# Patient Record
Sex: FEMALE | Race: WHITE | NOT HISPANIC OR LATINO | Employment: OTHER | URBAN - METROPOLITAN AREA
[De-identification: names, ages, dates, MRNs, and addresses within clinical notes are randomized per-mention and may not be internally consistent; named-entity substitution may affect disease eponyms.]

---

## 2017-03-07 ENCOUNTER — GENERIC CONVERSION - ENCOUNTER (OUTPATIENT)
Dept: OTHER | Facility: OTHER | Age: 77
End: 2017-03-07

## 2017-05-04 ENCOUNTER — ALLSCRIPTS OFFICE VISIT (OUTPATIENT)
Dept: OTHER | Facility: OTHER | Age: 77
End: 2017-05-04

## 2017-06-29 ENCOUNTER — ALLSCRIPTS OFFICE VISIT (OUTPATIENT)
Dept: OTHER | Facility: OTHER | Age: 77
End: 2017-06-29

## 2017-09-12 ENCOUNTER — ALLSCRIPTS OFFICE VISIT (OUTPATIENT)
Dept: OTHER | Facility: OTHER | Age: 77
End: 2017-09-12

## 2017-10-02 ENCOUNTER — APPOINTMENT (EMERGENCY)
Dept: RADIOLOGY | Facility: HOSPITAL | Age: 77
End: 2017-10-02
Payer: COMMERCIAL

## 2017-10-02 ENCOUNTER — HOSPITAL ENCOUNTER (EMERGENCY)
Facility: HOSPITAL | Age: 77
Discharge: HOME | End: 2017-10-02
Attending: EMERGENCY MEDICINE | Admitting: EMERGENCY MEDICINE
Payer: COMMERCIAL

## 2017-10-02 VITALS
OXYGEN SATURATION: 95 % | RESPIRATION RATE: 14 BRPM | WEIGHT: 129.41 LBS | SYSTOLIC BLOOD PRESSURE: 163 MMHG | HEART RATE: 87 BPM | TEMPERATURE: 98 F | DIASTOLIC BLOOD PRESSURE: 81 MMHG

## 2017-10-02 DIAGNOSIS — S86.911A MUSCLE STRAIN OF RIGHT LOWER LEG, INITIAL ENCOUNTER: Primary | ICD-10-CM

## 2017-10-02 PROCEDURE — 73502 X-RAY EXAM HIP UNI 2-3 VIEWS: CPT

## 2017-10-02 PROCEDURE — 99284 EMERGENCY DEPT VISIT MOD MDM: CPT

## 2017-10-02 PROCEDURE — 73564 X-RAY EXAM KNEE 4 OR MORE: CPT

## 2017-10-02 RX ORDER — ACETAMINOPHEN 325 MG/1
650 TABLET ORAL ONCE
Status: COMPLETED | OUTPATIENT
Start: 2017-10-02 | End: 2017-10-02

## 2017-10-02 RX ADMIN — ACETAMINOPHEN 650 MG: 325 TABLET ORAL at 10:21

## 2017-10-02 NOTE — ED NOTES
[facility] transport set up by family  Transport arrived to emergency department  Pt wheeled off of unit safely       Opal Garcia RN  10/02/17 7182

## 2017-10-02 NOTE — ED NOTES
Attempted to contacts pt emergency contact, Rahul Lopes  OK per pt to attempt to contact family for transportation back to facility  Rahul Lopes did not answer, pt attempted by personal phone with no answer  Will try back       Wilmer Lopez RN  10/02/17 7775

## 2017-10-02 NOTE — ED NOTES
Pt ambulated safely with walker  Pt ambulated with steady gait  Pt denies being lightheaded or dizzy  Pt to use walker instead of cane from now on       Ronald Henderson RN  10/02/17 3710

## 2017-10-02 NOTE — DISCHARGE INSTRUCTIONS
Leg Pain   WHAT YOU NEED TO KNOW:   Leg pain may be caused by a variety of health conditions  Your tests did not show any broken bones or blood clots  DISCHARGE INSTRUCTIONS:   Return to the emergency department if:   · You have a fever  · Your leg starts to swell  · Your leg pain gets worse  · You have numbness or tingling in your leg or toes  · You cannot put any weight on or move your leg  Contact your healthcare provider if:   · Your pain does not decrease, even after treatment  · You have questions or concerns about your condition or care  Medicines:   · NSAIDs , such as ibuprofen, help decrease swelling, pain, and fever  This medicine is available with or without a doctor's order  NSAIDs can cause stomach bleeding or kidney problems in certain people  If you take blood thinner medicine, always ask your healthcare provider if NSAIDs are safe for you  Always read the medicine label and follow directions  · Take your medicine as directed  Contact your healthcare provider if you think your medicine is not helping or if you have side effects  Tell him of her if you are allergic to any medicine  Keep a list of the medicines, vitamins, and herbs you take  Include the amounts, and when and why you take them  Bring the list or the pill bottles to follow-up visits  Carry your medicine list with you in case of an emergency  Follow up with your healthcare provider as directed: You may need more tests to find the cause of your leg pain  You may need to see an orthopedic specialist or a physical therapist  Write down your questions so you remember to ask them during your visits  Manage your leg pain:   · Rest  your injured leg so that it can heal  You may need an immobilizer, brace, or splint to limit the movement of your leg  You may need to avoid putting any weight on your leg for at least 48 hours  Return to normal activities as directed      · Ice  the injury for 20 minutes every 4 hours for up to 24 hours, or as directed  Use an ice pack, or put crushed ice in a plastic bag  Cover it with a towel to protect your skin  Ice helps prevent tissue damage and decreases swelling and pain  · Elevate  your injured leg above the level of your heart as often as you can  This will help decrease swelling and pain  If possible, prop your leg on pillows or blankets to keep the area elevated comfortably  · Use assistive devices as directed  You may need to use a cane or crutches  Assistive devices help decrease pain and pressure on your leg when you walk  Ask your healthcare provider for more information about assistive devices and how to use them correctly  · Maintain a healthy weight  Extra body weight can cause pressure and pain in your hip, knee, and ankle joints  Ask your healthcare provider how much you should weigh  Ask him to help you create a weight loss plan if you are overweight  © 2017 [employer] Information is for End User's use only and may not be sold, redistributed or otherwise used for commercial purposes  All illustrations and images included in CareNotes® are the copyrighted property of A [employer] M [employer]  or Mannie Pacheco  The above information is an  only  It is not intended as medical advice for individual conditions or treatments  Talk to your doctor, nurse or pharmacist before following any medical regimen to see if it is safe and effective for you  How to Choose and Use a Walker   WHAT YOU NEED TO KNOW:   How do I choose a walker? Your healthcare provider will help you choose the walker that is right for you  You may need a walker with wheels or hand brakes  Walkers can be adjusted for your height  Some have built-in seats  Walkers may be folded for travel or storage  How do I use the walker? · To stand up:      ¨ Put the walker in front of you and slide forward in the chair  ¨ Grasp the arms of the chair       ¨ Lean slightly forward and push on the arms of the chair to raise yourself  ¨ Grasp the handles and step forward into the walker  ¨ Stand with your walker until you feel balanced and ready to walk  ¨ Move the walker forward about 1 step ahead of you  Make sure it is firmly set on the ground before you step forward  · To sit down:      ¨ Stand with the back of your legs against the chair seat  ¨ Reach back with both hands to  the chair arms  ¨ Slowly sit down and slide back into the chair  What are some safety tips for walkers that I should know? · Take small steps  when you use the walker  Do not move the walker too far in front of you as you walk  · Stand for a few seconds before you start to walk  This will help prevent dizziness  · Look straight ahead when you walk  You may run into or trip over something if you look at your feet  · Wear shoes with rubber soles , such as tennis shoes  Do not wear slippers because they can slide off your feet and cause a fall  Do not wear shoes with leather heels or soles that may slide  · Check the rubber tips and wheels on your walker  Replace them if they are worn down or torn  This helps prevent the walker from sliding or tipping over  · Check the floor to be sure it is safe  The floor must be clean, dry, and well lit  Remove throw rugs to prevent falls  Tape or nail down loose carpet edges  Keep the traffic areas and the floor free of clutter  · Attach a bag or basket to the walker  to carry items you may need  CARE AGREEMENT:   You have the right to help plan your care  Learn about your health condition and how it may be treated  Discuss treatment options with your caregivers to decide what care you want to receive  You always have the right to refuse treatment  The above information is an  only  It is not intended as medical advice for individual conditions or treatments   Talk to your doctor, nurse or pharmacist before following any medical regimen to see if it is safe and effective for you  © 2017 [employer] Information is for End User's use only and may not be sold, redistributed or otherwise used for commercial purposes  All illustrations and images included in CareNotes® are the copyrighted property of [employer]  or Mannie Pacheco

## 2017-10-02 NOTE — ED NOTES
Spoke with pts family member, Avril Sandhu at [phone]  Per Avril Height she will contact other family to arrange transport back to facility  Call back number provided       Jose Roberto Pepper RN  10/02/17 5932

## 2017-10-02 NOTE — ED PROVIDER NOTES
History  Chief Complaint   Patient presents with    Leg Pain     Pt c/o RLE pain, pt reports she thinks she "pulled it" getting into bed last night  Pt reports her legs are swollen but usually are  History provided by:  Patient  Leg Pain   Location:  Leg  Lower extremity injury: swing right leg into bed yesterday, started in groin, now down to knee along lateral aspect  Leg location:  R leg  Pain details:     Quality:  Sharp    Radiates to:  R leg    Severity:  Moderate    Onset quality:  Sudden    Duration:  12 hours    Timing:  Constant    Progression:  Waxing and waning  Chronicity:  New  Tetanus status:  Up to date  Prior injury to area:  No  Relieved by:  Rest  Worsened by:  Bearing weight and activity  Associated symptoms: swelling ( chronic and unchanged)    Associated symptoms: no decreased ROM, no fever, no neck pain, no numbness and no stiffness        None       Past Medical History:   Diagnosis Date    Hypertension        History reviewed  No pertinent surgical history  History reviewed  No pertinent family history  I have reviewed and agree with the history as documented  Social History   Substance Use Topics    Smoking status: Former Smoker    Smokeless tobacco: Never Used    Alcohol use No        Review of Systems   Constitutional: Negative for activity change, chills, diaphoresis and fever  HENT: Negative for congestion, sinus pressure and sore throat  Eyes: Negative for pain and visual disturbance  Respiratory: Negative for cough, chest tightness, shortness of breath, wheezing and stridor  Cardiovascular: Negative for chest pain and palpitations  Gastrointestinal: Negative for abdominal distention, abdominal pain, constipation, diarrhea, nausea and vomiting  Genitourinary: Negative for dysuria and frequency  Musculoskeletal: Negative for neck pain, neck stiffness and stiffness  Skin: Negative for rash     Neurological: Negative for dizziness, speech difficulty, light-headedness, numbness and headaches  Physical Exam  ED Triage Vitals [10/02/17 0926]   Temperature Pulse Respirations Blood Pressure SpO2   98 °F (36 7 °C) 92 14 (!) 187/90 93 %      Temp Source Heart Rate Source Patient Position - Orthostatic VS BP Location FiO2 (%)   Oral Monitor Sitting Right arm --      Pain Score       Worst Possible Pain           Physical Exam   Constitutional: She is oriented to person, place, and time  She appears well-developed  No distress  HENT:   Head: Normocephalic and atraumatic  Eyes: Conjunctivae are normal  Pupils are equal, round, and reactive to light  Right eye exhibits no discharge  Left eye exhibits no discharge  No scleral icterus  Neck: Normal range of motion  Neck supple  No tracheal deviation present  Cardiovascular: Normal rate, regular rhythm, normal heart sounds and intact distal pulses  No murmur heard  Pulmonary/Chest: Effort normal and breath sounds normal  No stridor  No respiratory distress  Abdominal: Soft  She exhibits no distension  There is no tenderness  There is no rebound and no guarding  Musculoskeletal: Normal range of motion  She exhibits tenderness  She exhibits no deformity  Patient having a difficult time lifting right leg up off stretcher, tenderness to palpation diffusely over right knee although no focal tenderness no pain to axial loading or with distraction of the lower extremity   Neurological: She is alert and oriented to person, place, and time  She exhibits normal muscle tone  Coordination normal    Skin: Skin is warm and dry  No rash noted  She is not diaphoretic  No erythema  No pallor  Psychiatric: She has a normal mood and affect  Vitals reviewed        ED Medications  Medications   acetaminophen (TYLENOL) tablet 650 mg (650 mg Oral Given 10/2/17 1021)       Diagnostic Studies  Labs Reviewed - No data to display    XR knee 4+ views Right injury   ED Interpretation   Degenerative changes no fracture      XR hip/pelv 2-3 vws right   ED Interpretation   Degenerative changes no fracture          Procedures  Procedures      Phone Contacts  ED Phone Contact    ED Course  ED Course as of Oct 02 1119   Mon Oct 02, 2017   1116 Patient able to ambulate with a walker without any difficulty, patient is secure and ambulating independently, advised patient that she should no longer walk with a cane but sole use a walker, I do believe this is a solely a muscular injury, patient to follow with PCP                                MDM  Number of Diagnoses or Management Options  Muscle strain of right lower leg, initial encounter: new and requires workup  Diagnosis management comments: 58-year-old female injured right leg yesterday swelling in into bed now having difficulty walking and right leg pain  Chronic lower extremity edema unchanged from baseline having a difficult time raising right lower extremity secondary to pain  , will x-ray to evaluate for bony pathology, although I think this is less likely most likely muscular strain       Amount and/or Complexity of Data Reviewed  Tests in the radiology section of CPT®: ordered and reviewed  Review and summarize past medical records: yes  Independent visualization of images, tracings, or specimens: yes      CritCare Time    Disposition  Final diagnoses:   Muscle strain of right lower leg, initial encounter     ED Disposition     ED Disposition Condition Comment    Discharge  Sagar Maxwell discharge to home/self care  Condition at discharge: Good        Follow-up Information    None       Patient's Medications    No medications on file     No discharge procedures on file      ED Provider  Electronically Signed by       Gabbie Crawford DO  10/02/17 1119

## 2017-11-09 ENCOUNTER — GENERIC CONVERSION - ENCOUNTER (OUTPATIENT)
Dept: OTHER | Facility: OTHER | Age: 77
End: 2017-11-09

## 2017-12-01 ENCOUNTER — TRANSCRIBE ORDERS (OUTPATIENT)
Dept: ADMINISTRATIVE | Facility: HOSPITAL | Age: 77
End: 2017-12-01

## 2017-12-01 DIAGNOSIS — G47.30 SLEEP APNEA, UNSPECIFIED TYPE: Primary | ICD-10-CM

## 2017-12-04 ENCOUNTER — HOSPITAL ENCOUNTER (OUTPATIENT)
Dept: SLEEP CENTER | Facility: CLINIC | Age: 77
Discharge: HOME | End: 2017-12-04
Payer: COMMERCIAL

## 2017-12-04 ENCOUNTER — TRANSCRIBE ORDERS (OUTPATIENT)
Dept: SLEEP CENTER | Facility: CLINIC | Age: 77
End: 2017-12-04

## 2017-12-04 DIAGNOSIS — G47.33 OSA (OBSTRUCTIVE SLEEP APNEA): Primary | ICD-10-CM

## 2017-12-04 DIAGNOSIS — G47.30 SLEEP APNEA, UNSPECIFIED TYPE: ICD-10-CM

## 2017-12-04 NOTE — PROGRESS NOTES
Consultation - [id number] [facility] [place]  64 y o  female  :1940  FFZ:[id number]    Physician Requesting Consult: Steve Iglesias MD     Reason for Consult : At your kind request I saw this patient for initial sleep evaluation today  The patient is here to evaluate for suspected Obstructive Sleep Apnea  Medications, Past, Family and Social Histories were reviewed  Comorbidities include:  Hypertension, dyslipidemia and lymphedema of lower extremities  Herewith findings in summary  Full details are available from our records  HPI:  She presents with complaint of excessive drowsiness of several months duration and can't fall asleep anywhere any time  She is dozing off inadvertently in conversations and recently fell off the toilet having fallen asleep  She lives alone but has been told she snores loudly and this disturbs others  She is not aware of breathing difficulties during sleep or modifying factors  She reported no features of restless leg syndrome or parasomnia  Sleep Routine:  She is getting less than 7 hours sleep  She typically falls asleep in less than 30 minutes  Sleep is interrupted infrequently  She awakens with the aid of an alarm and is not rested  She rated herself at 20/24 on the Olla Sleepiness Scale  Habits:   reports that she has quit smoking  She has never used smokeless tobacco ,  reports that she does not drink alcohol ,  reports that she does not use drugs  , she uses limited caffeine  She has not been exercising  ROS:  She has had approximately 30 lb weight gain in the past few months and attributes this to swelling of her legs  She has difficulty with memory  She has some dyspnea on effort but denied other respiratory or cardiac symptoms  A 10 point review of systems was otherwise unremarkable  Physical Exam: Salient findings on exam, are a body mass index thirty-five  Vitals are stable    Mental state    appears normal   Craniofacial anatomy is normal  Neck Circumference is 41 cm  There is excess fatty tissue and neck is thick  There are no abnormal neck masses  Nasal airway is patent  Mucous membranes appeared normal  The oral airway is crowded  Base of tongue is at Modified Mallampati class IV  She has thoracic kyphosis  Respiratory effort is normal  Air entry is good bilaterally  There are no wheezes or rales  Heart sounds are regular, there are no murmurs, no JVD  She has 3+ pedal edema  She is ambulant with a walker She has truncal obesity ]  The rest of her exam was unremarkable  Impression:   1  Probable obstructive sleep apnea  2  She may also have sleep-related hypoxia  3  Hypersomnolence secondary to the above  4  She is getting insufficient sleep   5  Swelling of the legs  6  Obesity  7  Hypertension    Plan:   1  With respect to above conditions, I counseled on pathophysiology, diagnosis, treatment options, risks and benefits; interrelationship and effects on symptoms and comorbidities; risks of no treatment; costs and insurance aspects  2  I advised on weight reduction, avoiding sleeping supine, using alcohol or sedating medications close to bed time and on safe driving practices  3  Nocturnal polysomnography is indicated and a diagnostic study will be scheduled  4  Patient is willing to try Positive airway pressure therapy and will be scheduled for a titration study if indicated  5  Follow-up will be scheduled after the studies to review results, further details of treatment options and to initiate/adjust therapy  Thank you for allowing me to participate in the care of this patient  I will keep you apprised of developments       Sincerely,    Naomi Gallegos MD  Board Certified Specialist

## 2017-12-17 ENCOUNTER — HOSPITAL ENCOUNTER (OUTPATIENT)
Dept: SLEEP CENTER | Facility: CLINIC | Age: 77
Discharge: HOME | End: 2017-12-17
Payer: COMMERCIAL

## 2017-12-17 DIAGNOSIS — G47.33 OSA (OBSTRUCTIVE SLEEP APNEA): ICD-10-CM

## 2017-12-17 PROCEDURE — 95810 POLYSOM 6/> YRS 4/> PARAM: CPT

## 2018-01-10 NOTE — MISCELLANEOUS
Message  pt called stating she thought when she saw Dr Roxy Nuñez and he ordered pumps for her legs he told her to d/c eliquis, furosemide 40mg and potassium  informed her he mentions in last ov note eliquis tx completed but no mention of other meds  in fact we do not even have them on her med sheet  she states she thought someone told her to d/c but she can't remember who  Dr Aracely Nagy is ? her and asked her to check w/ us  req she f/u Dr Aracely Nagy as it was not Dr Roxy Nuñez that made this recommendation  Active Problems    1  Deep vein thrombosis (DVT) of popliteal vein of right lower extremity, unspecified   chronicity (453 41) (I82 431)   2  Lymphedema (457 1) (I89 0)    Current Meds   1  Anastrozole 1 MG Oral Tablet; Therapy: (Recorded:[id number]) to Recorded   2  Aspirin 81 MG TABS; Therapy: (Recorded:[id number]) to Recorded   3  Atorvastatin Calcium 20 MG Oral Tablet; Therapy: (Recorded:04Aug2016) to Recorded   4  Calcium 500 + D TABS; Therapy: (Recorded:[id number]) to Recorded   5  Metoprolol Tartrate 25 MG Oral Tablet; Therapy: (Recorded:[id number]) to Recorded   6  PreserVision AREDS CAPS; Therapy: (Recorded:65Ggq3480) to Recorded   7  Vitamin B12-Folic Acid 360-083 MCG Oral Tablet; Therapy: (Recorded:71Xfl6974) to Recorded   8  Vitamin D3 2000 UNIT Oral Tablet; Therapy: (Recorded:70Pim9758) to Recorded    Allergies    1   No Known Drug Allergies    Signatures   Electronically signed by : Delmi Treviño, ; Mar  7 2017  3:29PM EST                       (Author)

## 2018-01-11 NOTE — MISCELLANEOUS
Message  Luis Manuel Massed results reviewed by Zahra Urias and it's ok for the pt to stop Eliquis  I notified the pt of this  Active Problems    1  Deep vein thrombosis (DVT) of popliteal vein of right lower extremity, unspecified   chronicity (453 41) (I82 431)    Current Meds   1  Anastrozole 1 MG Oral Tablet; Therapy: (Recorded:04Aug2016) to Recorded   2  Aspirin 81 MG TABS; Therapy: (Recorded:04Aug2016) to Recorded   3  Atorvastatin Calcium 20 MG Oral Tablet; Therapy: (Recorded:04Aug2016) to Recorded   4  Calcium 500 + D TABS; Therapy: (Recorded:04Aug2016) to Recorded   5  Eliquis 5 MG Oral Tablet; Take 1 tablet twice daily; Therapy: 04Aug2016 to (Jen Marroquin)  Requested for: 04Aug2016; Last   Rx:04Aug2016; Status: ACTIVE - Transmit to Pharmacy - Awaiting Verification Ordered   6  Metoprolol Tartrate 25 MG Oral Tablet; Therapy: (Recorded:04Aug2016) to Recorded   7  PreserVision AREDS CAPS; Therapy: (Recorded:04Aug2016) to Recorded   8  Vitamin B12-Folic Acid 738-800 MCG Oral Tablet; Therapy: (Recorded:04Aug2016) to Recorded   9  Vitamin D3 2000 UNIT Oral Tablet; Therapy: (Recorded:04Aug2016) to Recorded    Allergies    1   No Known Drug Allergies    Plan  Deep vein thrombosis (DVT) of popliteal vein of right lower extremity, unspecified  chronicity    · Eliquis 5 MG Oral Tablet    Signatures   Electronically signed by : Daniel Mendez, ; Nov 10 2016  2:21PM EST                       (Author)

## 2018-01-12 VITALS
WEIGHT: 199 LBS | SYSTOLIC BLOOD PRESSURE: 118 MMHG | DIASTOLIC BLOOD PRESSURE: 72 MMHG | BODY MASS INDEX: 36.62 KG/M2 | RESPIRATION RATE: 18 BRPM | HEIGHT: 62 IN | HEART RATE: 74 BPM

## 2018-01-13 VITALS
DIASTOLIC BLOOD PRESSURE: 70 MMHG | BODY MASS INDEX: 36.62 KG/M2 | RESPIRATION RATE: 16 BRPM | SYSTOLIC BLOOD PRESSURE: 118 MMHG | WEIGHT: 199 LBS | HEIGHT: 62 IN | HEART RATE: 77 BPM

## 2018-01-13 VITALS
HEIGHT: 62 IN | HEART RATE: 76 BPM | DIASTOLIC BLOOD PRESSURE: 68 MMHG | SYSTOLIC BLOOD PRESSURE: 122 MMHG | RESPIRATION RATE: 16 BRPM

## 2018-01-13 NOTE — MISCELLANEOUS
Message  patient called questioning if she should continue her anticoagulation  I read Dr Ada Peterson note and he said he gave patient a 2 month supply and then she should follow with PCP  I gave her that info   Active Problems    1  Deep vein thrombosis (DVT) of popliteal vein of right lower extremity, unspecified   chronicity (453 41) (I82 431)    Current Meds   1  Anastrozole 1 MG Oral Tablet; Therapy: (Recorded:04Aug2016) to Recorded   2  Aspirin 81 MG TABS; Therapy: (Recorded:04Aug2016) to Recorded   3  Atorvastatin Calcium 20 MG Oral Tablet; Therapy: (Recorded:04Aug2016) to Recorded   4  Calcium 500 + D TABS; Therapy: (Recorded:04Aug2016) to Recorded   5  Eliquis 5 MG Oral Tablet; Take 1 tablet twice daily; Therapy: 04Aug2016 to (Jayda Askew)  Requested for: 04Aug2016; Last   Rx:04Aug2016; Status: ACTIVE - Transmit to Pharmacy - Awaiting Verification Ordered   6  Metoprolol Tartrate 25 MG Oral Tablet; Therapy: (Recorded:04Aug2016) to Recorded   7  PreserVision AREDS CAPS; Therapy: (Recorded:04Aug2016) to Recorded   8  Vitamin B12-Folic Acid 685-539 MCG Oral Tablet; Therapy: (Recorded:04Aug2016) to Recorded   9  Vitamin D3 2000 UNIT Oral Tablet; Therapy: (Recorded:04Aug2016) to Recorded    Allergies    1  No Known Drug Allergies    Signatures   Electronically signed by :  Luann Thompson, ; Oct 28 2016  5:27PM EST                       (Author)

## 2018-01-15 NOTE — MISCELLANEOUS
Message  Rec'd a call from Dr Raffaele Fernández and he wanted to know when this pt should stop her Eliquis  Our office previous said that she needed to f/u with pcp but after looking into it we saw the pt at [facility] in consult for DVT in July  Eliquis was started there  Sheis to remain on it x 3 months and have an lev to decide if she can come off  LEV scheduled for 11/9 @ 3pm 248  Once I get the results will send to Dr Julio C Milan to see about stopping the Eliquis      Active Problems    1  Deep vein thrombosis (DVT) of popliteal vein of right lower extremity, unspecified   chronicity (453 41) (I82 431)    Current Meds   1  Anastrozole 1 MG Oral Tablet; Therapy: (Recorded:04Aug2016) to Recorded   2  Aspirin 81 MG TABS; Therapy: (Recorded:04Aug2016) to Recorded   3  Atorvastatin Calcium 20 MG Oral Tablet; Therapy: (Recorded:04Aug2016) to Recorded   4  Calcium 500 + D TABS; Therapy: (Recorded:04Aug2016) to Recorded   5  Eliquis 5 MG Oral Tablet; Take 1 tablet twice daily; Therapy: 04Aug2016 to (Michael Watters)  Requested for: 04Aug2016; Last   Rx:04Aug2016; Status: ACTIVE - Transmit to Pharmacy - Awaiting Verification Ordered   6  Metoprolol Tartrate 25 MG Oral Tablet; Therapy: (Recorded:04Aug2016) to Recorded   7  PreserVision AREDS CAPS; Therapy: (Recorded:04Aug2016) to Recorded   8  Vitamin B12-Folic Acid 788-436 MCG Oral Tablet; Therapy: (Recorded:04Aug2016) to Recorded   9  Vitamin D3 2000 UNIT Oral Tablet; Therapy: (Recorded:04Aug2016) to Recorded    Allergies    1   No Known Drug Allergies    Signatures   Electronically signed by : Aida Lennon, ; Nov 4 2016 12:35PM EST                       (Author)

## 2018-01-22 VITALS
RESPIRATION RATE: 16 BRPM | HEART RATE: 78 BPM | BODY MASS INDEX: 36.09 KG/M2 | WEIGHT: 196.13 LBS | DIASTOLIC BLOOD PRESSURE: 68 MMHG | SYSTOLIC BLOOD PRESSURE: 130 MMHG | HEIGHT: 62 IN | TEMPERATURE: 98.8 F

## 2018-02-15 ENCOUNTER — HOSPITAL ENCOUNTER (OUTPATIENT)
Dept: SLEEP CENTER | Facility: CLINIC | Age: 78
Discharge: HOME | End: 2018-02-15
Payer: COMMERCIAL

## 2018-02-15 DIAGNOSIS — G47.33 OSA (OBSTRUCTIVE SLEEP APNEA): ICD-10-CM

## 2018-02-15 PROCEDURE — 95811 POLYSOM 6/>YRS CPAP 4/> PARM: CPT

## 2018-02-16 NOTE — PROGRESS NOTES
Sleep Study Documentation    Pre-Sleep Study       Sleep testing procedure explained to patient:YES    Patient napped prior to study:YES- more than 30 minutes  Napped after 2PM: yes    Caffeine:Dayshift worker after 12PM   Caffeine use:NO    Alcohol:Dayshift workers after 5PM: Alcohol use:NO    Typical day for patient:YES       Study Documentation  Treatment   Optimal PAP pressure: 16 0  Leak:Small  Snore:Eliminated  REM Obtained:yes  Supplemental O2: no    Minimum SaO2 88%  Baseline SaO2 93%  PAP mask tried (list all)RESMED AIR FIT N20 NASAL MASK SIZE MEDIUM; RESMED AIR FIT F20 FFM SIZE MEDIUM  PAP mask choice (final)RESMED AIR FIT F20 FFM SIZE MEDIUM  PAP pressure at which snoring was eliminated 16 0  Minimum SaO2 at final PAP pressure 95%  Mode of Therapy:CPAP  ETCO2:No  CPAP changed to BiPAP:No    Mode of Therapy:CPAP    EKG abnormalities: no If yes, EPOCH example and comments:     EEG abnormalities: no If yes, EPOCH example and comments    Study Terminated:no      Post-Sleep Study    Medication used at bedtime or during sleep study:YES other prescription medications    Patient reports time it took to fall asleep:greater than 60 minutes    Patient reports waking up during study:1 to 2 times  Patient reports returning to sleep in 10 to 30 minutes  Patient reports sleeping 2 to 4 hours with dreaming  Patient reports sleep during study:typical    Patient rated sleepiness: Very sleepy or tired    PAP treatment:yes: Post PAP treatment patient reports feeling unsure if a change is noted and  would wear PAP mask at home

## 2018-02-21 DIAGNOSIS — G47.33 OSA (OBSTRUCTIVE SLEEP APNEA): Primary | ICD-10-CM

## 2018-02-26 ENCOUNTER — HOSPITAL ENCOUNTER (OUTPATIENT)
Dept: SLEEP CENTER | Facility: CLINIC | Age: 78
Discharge: HOME | End: 2018-02-26

## 2018-02-26 ENCOUNTER — OFFICE VISIT (OUTPATIENT)
Dept: SLEEP CENTER | Facility: CLINIC | Age: 78
End: 2018-02-26
Payer: COMMERCIAL

## 2018-02-26 VITALS
BODY MASS INDEX: 35.37 KG/M2 | DIASTOLIC BLOOD PRESSURE: 76 MMHG | HEIGHT: 62 IN | SYSTOLIC BLOOD PRESSURE: 130 MMHG | WEIGHT: 192.2 LBS | HEART RATE: 80 BPM

## 2018-02-26 DIAGNOSIS — E66.9 OBESITY (BMI 30-39.9): ICD-10-CM

## 2018-02-26 DIAGNOSIS — I10 ESSENTIAL HYPERTENSION: ICD-10-CM

## 2018-02-26 DIAGNOSIS — F51.12 INSUFFICIENT SLEEP SYNDROME: ICD-10-CM

## 2018-02-26 DIAGNOSIS — G47.33 OSA (OBSTRUCTIVE SLEEP APNEA): ICD-10-CM

## 2018-02-26 DIAGNOSIS — G47.10 HYPERSOMNIA: Primary | ICD-10-CM

## 2018-02-26 DIAGNOSIS — R22.43 LOCALIZED SWELLING OF BOTH LOWER LEGS: ICD-10-CM

## 2018-02-26 RX ORDER — CHOLECALCIFEROL (VITAMIN D3) 125 MCG
CAPSULE ORAL
COMMUNITY

## 2018-02-26 RX ORDER — ATORVASTATIN CALCIUM 20 MG/1
TABLET, FILM COATED ORAL
COMMUNITY

## 2018-02-26 RX ORDER — ANASTROZOLE 1 MG/1
1 TABLET ORAL DAILY
Refills: 6 | COMMUNITY
Start: 2018-02-14

## 2018-02-26 NOTE — PROGRESS NOTES
Follow-up Note - Sleep Center   Myrna Maxwell  76 y o  female  :1940  MRN:[id number]    I saw Price Davies in sleep clinic today for her sleep disordered breathing, coexisting sleep complaints & medical conditions  The patient had both diagnostic and therapeutic sleep studies and is here to review results and to initiate therapy  The diagnostic study confirmed moderate obstructive sleep apnea:  AHI 19/hour   Minimum oxygen saturation 76 %  [and 22% of total sleep time was spent with saturations less than 90%  Intermittent snoring was noted  During the subsequent therapeutic study, sleep disordered breathing was successfully remediated with PAP at 16 cm H2O  But there were excessive mask leaks  Medications, Past, Family, Social History & ROS were reviewed  HPI:   Patient had  difficulty tolerating positive airway pressure therapy on the titration study night  Reported no nasal congestion, no mucosal dryness, no chest or abdominal discomfort  She tolerated positive airway pressure therapy but sleep was not better than usual and felt no different the next day  Sleep Routine:  No interval changes  She continues to get insufficient sleep at around 6:00 a m  a night  She has ongoing excessive drowsiness  She rated herself at Total score: 20 /24 on the [place] sleepiness scale  On Exam: Vitals are stable: Blood pressure 130/76, pulse 80, height 5' 2" (1 575 m), weight 87 2 kg (192 lb 3 2 oz)  Body mass index is 35 15 kg/m²  Halie Nissen Neck Circumference: 41cm  Patient is well groomed alert, orientated, cooperative and in no distress  Mental state appeared normal    ] Physical findings are essentially unchanged from previous  IMPRESSION:    1  Hypersomnia     2  DAVE (obstructive sleep apnea)  Sleep F/U  - established patient    Cpap DME   3  Insufficient sleep syndrome     4  Obesity (BMI 30-39 9)     5  Essential hypertension     6   Localized swelling of both lower legs PLAN:    1  I reviewed results of the Sleep studies with the patient  2  With respect to above conditions, I counseled on pathophysiology, diagnosis, treatment options, risks and benefits; inter-relationship and effects on symptoms and comorbidities; risks of no treatment; costs and insurance aspects  3  Patient elected positive airway pressure therapy and care coordinated with the DME provider for set up in clinic today  Because of the mask leaks, I gave her a prescription for auto titrating Pap in the range of 12-18 cm H2O   4  Need for compliance with therapy and weight reduction were emphasized  5   I also advised allowing sufficient opportunity for sleep  6  Follow-up to be scheduled in 2 months to monitor compliance, progress and to adjust therapy  Thank you for allowing me to participate in the care of this patient  I will keep you apprised of developments      Sincerely,    Saba Wise MD  Board Certified Specialist

## 2018-02-26 NOTE — PROGRESS NOTES
Review of Systems      Genitourinary post menopausal   Cardiology lightheadedness   Gastrointestinal abdominal pain disturbing sleep   Neurology forgetfulness, decreased concentration, difficulity finding words and loss/change of vision   Constitutional none   Integumentary easy brusing   Psychiatry none   Musculoskeletal none   Pulmonary none   ENT none   Endocrine none   Hematological none

## 2018-04-30 ENCOUNTER — OFFICE VISIT (OUTPATIENT)
Dept: SLEEP CENTER | Facility: CLINIC | Age: 78
End: 2018-04-30

## 2018-04-30 VITALS
BODY MASS INDEX: 35.85 KG/M2 | HEART RATE: 80 BPM | SYSTOLIC BLOOD PRESSURE: 110 MMHG | DIASTOLIC BLOOD PRESSURE: 70 MMHG | HEIGHT: 62 IN | WEIGHT: 194.8 LBS

## 2018-04-30 DIAGNOSIS — G47.10 HYPERSOMNIA: ICD-10-CM

## 2018-04-30 DIAGNOSIS — R22.43 LOCALIZED SWELLING OF BOTH LOWER LEGS: ICD-10-CM

## 2018-04-30 DIAGNOSIS — E66.9 OBESITY (BMI 30-39.9): ICD-10-CM

## 2018-04-30 DIAGNOSIS — G47.33 OSA (OBSTRUCTIVE SLEEP APNEA): Primary | ICD-10-CM

## 2018-04-30 DIAGNOSIS — R68.2 DRY MOUTH: ICD-10-CM

## 2018-04-30 DIAGNOSIS — F51.12 INSUFFICIENT SLEEP SYNDROME: ICD-10-CM

## 2018-04-30 DIAGNOSIS — I10 ESSENTIAL HYPERTENSION: ICD-10-CM

## 2018-04-30 NOTE — PROGRESS NOTES
Review of Systems      Genitourinary none   Cardiology ankle/leg swelling   Gastrointestinal frequent heartburn/acid reflux   Neurology forgetfulness   Constitutional excessive sweating at night and weight change   Integumentary rash or dry skin   Psychiatry none   Musculoskeletal none   Pulmonary shortness of breath with activity and difficulty breathing when lying flat    ENT none   Endocrine frequent urination   Hematological none

## 2018-04-30 NOTE — PROGRESS NOTES
Follow-Up Note - Sleep Center   Zacarias Bosworth Groeger  76 y o  female  :1940  MRN:[id number]    CC: I saw this patient for follow-up in clinic today for her Sleep Disordered Breathing, Coexisting Sleep and Medical Problems  Medications, Past, Family & Social History were reviewed  Interval changes: [none reported ]   She  has a past medical history of Hypertension  She has a current medication list which includes the following prescription(s): anastrozole, aspirin, atorvastatin, calcium carbonate-vitamin d, vitamin d3, cyanocobalamin, and metoprolol tartrate  ROS: reviewed, see attached [& significant for acid reflux, dyspnea on effort and swelling of her legs]  HPI:  Tad Figures reports:   · Using PAP [regularly] and experiencing [no adverse effects]  · Compliance data download shows: [ she is using PAP > 4 hours per night 33% of the time and AHI is 3 8/hour at [90th percentile] pressure of 12 9cm H2O ]  She did not use CPAP in the 1st 2 weeks because she had difficulty applying the mask and needed special accommodations  · some difficulty tolerating PAP; [ dry mouth and air leaks from mask]  Her water chamber runs runs Dry within a few hours  · benefiting from use ; [sleeping better]       Sleep Routine:  She reports getting 4-5 hours sleep; she having difficulty initiating sleep, but not maintaining sleep   She awakens with the aid of an alarm feeling unrefreshed  She admits to much improved excessive drowsiness and is no longer falling asleep during the daytime but rated herself at Total score: 19 /24 on the [place] sleepiness scale ]      EXAM: Vitals are stable: Blood pressure 110/70, pulse 80, height 5' 2" (1 575 m), weight 88 4 kg (194 lb 12 8 oz)  Body mass index is 35 63 kg/m²  Jerone Pj Neck Circumference: 41 cm  Patient is alert, orientated, cooperative [and in no distress]   She has a thoracic kyphosis and maintains her neck flexed anteriorly with chin resting on her chest   Mental state [appears normal]  There are [no] facial pressure marks or rashes  She is ambulant with a walker  Physical findings are essentially unchanged from previous  IMPRESSION:     1  DAVE (obstructive sleep apnea)     2  Hypersomnia     3  Insufficient sleep syndrome     4  Dry mouth     5  Obesity (BMI 30-39 9)     6  Essential hypertension     7  Localized swelling of both lower legs         PLAN:  1  Treatment with  PAP is medically necessary and Natalie Ar is agreable to continue use  2  Pressure setting: Continue at 13-15 cm H2O     3  Instruction on care of equipment and strategies to improve comfort with use of PAP were discussed [:controlling mucosal dryness, nasal symptoms and Mask leaks]  4  Care coordinated with DME provider and prescription to replace supplies as needed was provided  5  Need for compliance with therapy and weight reduction were emphasized  6  Sleep Hygiene and behavioral techniques to manage Insomnia were discussed  I also advised allowing sufficient opportunity for sleep  7  With your consent, follow-up is advised in [1 Gabe Simmonds [or sooner if needed] [to monitor progress, compliance and to adjust therapy]  Thank you for allowing me to participate in the care of this patient      Sincerely,    La Nena Khan MD  Board Certified Specialist

## 2018-05-01 ENCOUNTER — TELEPHONE (OUTPATIENT)
Dept: SLEEP CENTER | Facility: CLINIC | Age: 78
End: 2018-05-01

## 2018-07-06 ENCOUNTER — TELEPHONE (OUTPATIENT)
Dept: SLEEP CENTER | Facility: CLINIC | Age: 78
End: 2018-07-06

## 2018-07-06 NOTE — TELEPHONE ENCOUNTER
I called PT and left a voicemail to explain her options  Either make an appt with PCP or Dr Hill Fearing        ----- Message from Mary Alexander sent at 7/6/2018 12:23 PM EDT -----  Regarding: Patient complaining of eye puffiness  Please call above patient  Patient is stating she has eye puffiness and asks if her CPAP pressure needs to be changed  The pressure itself and the mask itself is not bothering the patient  I told the patient to make sure her CPAP mask is not blowing air in to her eyes at any time  I told the patient she may consider seeing her PCP for the eye puffiness  Patient  Asked if she could consider a different CPAP mask  I advised patient to discuss this with Francois Soto

## 2018-07-16 ENCOUNTER — OFFICE VISIT (OUTPATIENT)
Dept: SLEEP CENTER | Facility: CLINIC | Age: 78
End: 2018-07-16
Payer: COMMERCIAL

## 2018-07-16 VITALS
DIASTOLIC BLOOD PRESSURE: 82 MMHG | BODY MASS INDEX: 36.1 KG/M2 | WEIGHT: 196.2 LBS | SYSTOLIC BLOOD PRESSURE: 102 MMHG | HEART RATE: 62 BPM | HEIGHT: 62 IN

## 2018-07-16 DIAGNOSIS — G47.33 OSA (OBSTRUCTIVE SLEEP APNEA): Primary | ICD-10-CM

## 2018-07-16 DIAGNOSIS — E66.9 OBESITY (BMI 30-39.9): ICD-10-CM

## 2018-07-16 DIAGNOSIS — I10 ESSENTIAL HYPERTENSION: ICD-10-CM

## 2018-07-16 DIAGNOSIS — G47.10 HYPERSOMNIA: ICD-10-CM

## 2018-07-16 DIAGNOSIS — F40.240 CLAUSTROPHOBIA: ICD-10-CM

## 2018-07-16 DIAGNOSIS — F51.12 INSUFFICIENT SLEEP SYNDROME: ICD-10-CM

## 2018-07-16 DIAGNOSIS — R68.2 DRY MOUTH: ICD-10-CM

## 2018-07-16 DIAGNOSIS — R60.0 PERIORBITAL EDEMA: ICD-10-CM

## 2018-07-16 PROCEDURE — 99214 OFFICE O/P EST MOD 30 MIN: CPT | Performed by: INTERNAL MEDICINE

## 2018-07-16 NOTE — PROGRESS NOTES
Follow-Up Note - Sleep Center   Bennie Maxwell  76 y o  female  :1940  MRN:[id number]    CC: I saw this patient for follow-up in clinic today for her Sleep Disordered Breathing, Coexisting Sleep and Medical Problems  PFSH, Problem List, Medications & Allergies were reviewed in EMR  Interval changes: [none reported ]   She  has a past medical history of Hypertension  She has a current medication list which includes the following prescription(s): anastrozole, aspirin, atorvastatin, calcium carbonate-vitamin d, vitamin d3, cyanocobalamin, and metoprolol tartrate  ROS: Reviewed (see attached)  Significant for ramon orbital puffiness even though she stopped using CPAP over a week ago  She denied nasal congestion or allergies  HPI:  With respect to sleep disordered breathing, compliance data download shows:  using PAP > 4 hours per night 53% of the time  GORDO (estimated) 1/hour at [90th percentile] pressure of 13 9cm H2O  She is using a full face mask but with minimum head get a to cause ramon orbital swelling  Elizabeth Tobin reports  · no  difficulty tolerating PAP;   · significant adverse effects: claustrophobia and ramon orbital swelling that she attributes to CPAP  · Benefitting from use: sleeping better     Sleep Routine:  She reports getting 5-6 sleep; she  having difficulty initiating sleep, but not maintaining sleep   She awakens with the aid of an alarm feeling unrefreshed  She has excessive drowsiness [ ]  [She rated herself at Total score: 18 /24 on the [place] sleepiness scale ]  She feels like napping but avoids doing so  Habits:[ reports that she has quit smoking  She has never used smokeless tobacco ], [ reports that she does not drink alcohol ], [ reports that she does not use drugs  ], Caffeine use: limited [ ], Exercise routine: regular [ ]        EXAM: /82   Pulse 62   Ht 5' 2" (1 575 m)   Wt 89 kg (196 lb 3 2 oz)   BMI 35 89 kg/m²      Patient is alert, orientated, cooperative [and in no distress]  Mental state [appears normal]  Craniofacial anatomy there is bilateral periorbital edema  There are [no] facial pressure marks or rashes  Neck Circumference: 46 cm  There are no abnormal neck masses  Nasal airway is [patent ]  Mucous membranes appeared normal  The oral airway [is crowded ] Base of tongue is at Mallampati class IV (only hard palate visible)  She is ambulant with a walker  [Apart from truncal obesity,] the rest of exam (Heart, Lungs, Abdomen, CNS and Musculoskeletal systems) was unremarkable   IMPRESSION:   1  DAVE (obstructive sleep apnea)     2  Hypersomnia     3  Dry mouth     4  Insufficient sleep syndrome     5  Claustrophobia     6  Periorbital edema     7  Obesity (BMI 30-39 9)     8  Essential hypertension         PLAN:  1  Treatment with  PAP is medically necessary and Keiry Rosa is agreable to continue use  2  Instruction on care of equipment and strategies to improve comfort with use of PAP were discussed  3  Care coordinated with DME provider and Rx to replace supplies provided  4  Pressure setting:  Continue at 12-14 cm H2O     5  Compliance with therapy was emphasized and strategies for weight reduction discussed  6  Options are limited:  I suggested a nasal interface together with a chin strap to prevent mouth opening  She may warrant a surgical procedure to minimize the ramon orbital edema  The alternate would be use of a mandibular advancement device,  upper airway surgery or no treatment for her obstructive sleep apnea and to accept the risks     7  With your consent, follow-up is advised in 1 month  [to monitor progress, compliance and to adjust therapy]  Thank you for allowing me to participate in the care of this patient      Sincerely,    Authenticated electronically by Angélica Green MD on 10/54/33   Board Certified Specialist

## 2018-07-16 NOTE — PROGRESS NOTES
Review of Systems      Genitourinary hot flashes at night   Cardiology ankle/leg swelling   Gastrointestinal frequent heartburn/acid reflux   Neurology numbness/tingling of an extremity, forgetfulness, poor concentration or confusion,  and balance problems   Constitutional claustrophobia, excessive sweating at night and weight change   Integumentary rash or dry skin   Psychiatry none   Musculoskeletal none   Pulmonary none   ENT none   Endocrine none   Hematological none

## 2020-03-18 ENCOUNTER — OFFICE VISIT (OUTPATIENT)
Dept: OBGYN CLINIC | Facility: CLINIC | Age: 80
End: 2020-03-18
Payer: COMMERCIAL

## 2020-03-18 VITALS
DIASTOLIC BLOOD PRESSURE: 92 MMHG | WEIGHT: 193 LBS | BODY MASS INDEX: 35.51 KG/M2 | HEIGHT: 62 IN | SYSTOLIC BLOOD PRESSURE: 128 MMHG

## 2020-03-18 DIAGNOSIS — L73.9 FOLLICULITIS: Primary | ICD-10-CM

## 2020-03-18 PROCEDURE — 99202 OFFICE O/P NEW SF 15 MIN: CPT | Performed by: OBSTETRICS & GYNECOLOGY

## 2020-03-18 RX ORDER — ACETAMINOPHEN 500 MG
500 TABLET ORAL EVERY 6 HOURS PRN
COMMUNITY

## 2020-03-18 RX ORDER — KETOCONAZOLE 20 MG/G
CREAM TOPICAL DAILY
COMMUNITY

## 2020-03-18 RX ORDER — MULTIVITAMIN
1 TABLET ORAL DAILY
COMMUNITY

## 2020-03-18 RX ORDER — CEPHALEXIN 500 MG/1
500 CAPSULE ORAL EVERY 12 HOURS SCHEDULED
Qty: 14 CAPSULE | Refills: 0 | Status: SHIPPED | OUTPATIENT
Start: 2020-03-18 | End: 2020-03-25

## 2020-03-18 RX ORDER — FUROSEMIDE 40 MG/1
40 TABLET ORAL 2 TIMES DAILY
COMMUNITY
End: 2021-06-21 | Stop reason: HOSPADM

## 2020-03-18 RX ORDER — B-COMPLEX WITH VITAMIN C
1 TABLET ORAL
COMMUNITY

## 2020-03-18 NOTE — PROGRESS NOTES
Assessment:  77 YO FEMALE   RECURRENT FOLLICULITIS   No active lesion noted today  History of heart failure  History of breast cancer  Thrombocytopenia  Scoliosis         Plan  Heat compression recommended at the time of any active infection  P o  Antibiotics consider Keflex  Female hygiene discussed  Return to office when needed    Keith Chong Meredith is a 78 y o  female here for a problem visit  Current complaints:  Recurrent folliculitis in her vulvar area  Personal health questionnaire reviewed: yes  Gynecologic History  No LMP recorded  Contraception: none not applicable      Obstetric History  OB History    Para Term  AB Living   0 0 0 0 0 0   SAB TAB Ectopic Multiple Live Births   0 0 0 0 0         The following portions of the patient's history were reviewed and updated as appropriate: allergies, current medications, past family history, past medical history, past social history, past surgical history and problem list     Review of Systems  Review of Systems     Objective     Physical Exam   Constitutional:   Patient is using the walker alert oriented awake x3, significant scoliosis noted   Abdominal: Soft  Genitourinary: There is no rash, tenderness or lesion on the right labia  There is no rash, tenderness or lesion on the left labia  Genitourinary Comments: Vulvar atrophy noted patient was complaining of her current abscess/folliculitis on her labia majora bilaterally no active infection noted on today's exam 1 folliculitis noted in the mons pubic area heat compression and keflex recommended        There are no Patient Instructions on file for this visit

## 2020-09-30 RX ORDER — CALCIUM CARBONATE 500(1250)
TABLET ORAL
Qty: 30 EACH | Refills: 4 | OUTPATIENT
Start: 2020-09-30

## 2021-02-12 ENCOUNTER — TELEPHONE (OUTPATIENT)
Dept: HEMATOLOGY ONCOLOGY | Facility: CLINIC | Age: 81
End: 2021-02-12

## 2021-02-12 NOTE — TELEPHONE ENCOUNTER
Someone from [facility] called to refer pt to Dr Urban Espitia for mammogram for history of breast cancer  I explained that Dr Urban Espitia is a breast surgeon, if she was scheduling a mammogram, this would be done through central scheduling  She stated that they are referring to DR Urban Espitia so she can order the mammogram   I explained that I would need records before I could schedule  The caller only knew that pt was being followed by the [facility] for her mammograms  They do not know what physician or specialty was following her, they did not know when pt was diagnosed, who performed her mastectomy or where this was done, or if she had any other treatment  I explained that I would need the records before I could schedule  She said she would look into this and call us back

## 2021-05-14 ENCOUNTER — TRANSCRIBE ORDERS (OUTPATIENT)
Dept: ADMINISTRATIVE | Facility: HOSPITAL | Age: 81
End: 2021-05-14

## 2021-05-14 DIAGNOSIS — Z85.3 HISTORY OF BREAST CANCER: Primary | ICD-10-CM

## 2021-05-28 ENCOUNTER — HOSPITAL ENCOUNTER (OUTPATIENT)
Dept: ULTRASOUND IMAGING | Facility: CLINIC | Age: 81
Discharge: HOME | End: 2021-05-28
Payer: COMMERCIAL

## 2021-05-28 ENCOUNTER — HOSPITAL ENCOUNTER (OUTPATIENT)
Dept: MAMMOGRAPHY | Facility: CLINIC | Age: 81
Discharge: HOME | End: 2021-05-28
Payer: COMMERCIAL

## 2021-05-28 ENCOUNTER — TELEPHONE (OUTPATIENT)
Dept: MAMMOGRAPHY | Facility: CLINIC | Age: 81
End: 2021-05-28

## 2021-05-28 VITALS — HEIGHT: 62 IN | WEIGHT: 193 LBS | BODY MASS INDEX: 35.51 KG/M2

## 2021-05-28 DIAGNOSIS — R92.8 ABNORMAL MAMMOGRAM: ICD-10-CM

## 2021-05-28 DIAGNOSIS — Z85.3 HISTORY OF BREAST CANCER: ICD-10-CM

## 2021-05-28 PROCEDURE — 76642 ULTRASOUND BREAST LIMITED: CPT

## 2021-05-28 PROCEDURE — G0279 TOMOSYNTHESIS, MAMMO: HCPCS

## 2021-05-28 PROCEDURE — 77065 DX MAMMO INCL CAD UNI: CPT

## 2021-06-15 ENCOUNTER — APPOINTMENT (EMERGENCY)
Dept: CT IMAGING | Facility: HOSPITAL | Age: 81
DRG: 291 | End: 2021-06-15
Payer: COMMERCIAL

## 2021-06-15 ENCOUNTER — HOSPITAL ENCOUNTER (INPATIENT)
Facility: HOSPITAL | Age: 81
LOS: 5 days | Discharge: HOME | DRG: 291 | End: 2021-06-21
Attending: EMERGENCY MEDICINE | Admitting: HOSPITALIST
Payer: COMMERCIAL

## 2021-06-15 DIAGNOSIS — I10 ESSENTIAL HYPERTENSION: ICD-10-CM

## 2021-06-15 DIAGNOSIS — R19.7 DIARRHEA: ICD-10-CM

## 2021-06-15 DIAGNOSIS — R11.10 VOMITING: ICD-10-CM

## 2021-06-15 DIAGNOSIS — R09.02 HYPOXIA: Primary | ICD-10-CM

## 2021-06-15 PROBLEM — J96.01 ACUTE RESPIRATORY FAILURE WITH HYPOXIA (HCC): Status: ACTIVE | Noted: 2021-06-15

## 2021-06-15 PROBLEM — K52.9 GASTROENTERITIS: Status: ACTIVE | Noted: 2021-06-15

## 2021-06-15 PROBLEM — M79.89 LEG SWELLING: Status: ACTIVE | Noted: 2021-06-15

## 2021-06-15 LAB
ALBUMIN SERPL BCP-MCNC: 3 G/DL (ref 3.5–5)
ALP SERPL-CCNC: 108 U/L (ref 46–116)
ALT SERPL W P-5'-P-CCNC: 26 U/L (ref 12–78)
ANION GAP SERPL CALCULATED.3IONS-SCNC: 9 MMOL/L (ref 4–13)
AST SERPL W P-5'-P-CCNC: 29 U/L (ref 5–45)
BASOPHILS # BLD AUTO: 0.03 THOUSANDS/ΜL (ref 0–0.1)
BASOPHILS NFR BLD AUTO: 0 % (ref 0–1)
BILIRUB SERPL-MCNC: 0.6 MG/DL (ref 0.2–1)
BUN SERPL-MCNC: 23 MG/DL (ref 5–25)
CALCIUM ALBUM COR SERPL-MCNC: 9.1 MG/DL (ref 8.3–10.1)
CALCIUM SERPL-MCNC: 8.3 MG/DL (ref 8.3–10.1)
CHLORIDE SERPL-SCNC: 103 MMOL/L (ref 100–108)
CO2 SERPL-SCNC: 28 MMOL/L (ref 21–32)
CREAT SERPL-MCNC: 1 MG/DL (ref 0.6–1.3)
EOSINOPHIL # BLD AUTO: 0 THOUSAND/ΜL (ref 0–0.61)
EOSINOPHIL NFR BLD AUTO: 0 % (ref 0–6)
ERYTHROCYTE [DISTWIDTH] IN BLOOD BY AUTOMATED COUNT: 15.8 % (ref 11.6–15.1)
GFR SERPL CREATININE-BSD FRML MDRD: 53 ML/MIN/1.73SQ M
GLUCOSE SERPL-MCNC: 159 MG/DL (ref 65–140)
HCT VFR BLD AUTO: 43 % (ref 34.8–46.1)
HGB BLD-MCNC: 13.2 G/DL (ref 11.5–15.4)
IMM GRANULOCYTES # BLD AUTO: 0.06 THOUSAND/UL (ref 0–0.2)
IMM GRANULOCYTES NFR BLD AUTO: 1 % (ref 0–2)
LYMPHOCYTES # BLD AUTO: 0.14 THOUSANDS/ΜL (ref 0.6–4.47)
LYMPHOCYTES NFR BLD AUTO: 1 % (ref 14–44)
MCH RBC QN AUTO: 28.2 PG (ref 26.8–34.3)
MCHC RBC AUTO-ENTMCNC: 30.7 G/DL (ref 31.4–37.4)
MCV RBC AUTO: 92 FL (ref 82–98)
MONOCYTES # BLD AUTO: 0.68 THOUSAND/ΜL (ref 0.17–1.22)
MONOCYTES NFR BLD AUTO: 7 % (ref 4–12)
NEUTROPHILS # BLD AUTO: 9.14 THOUSANDS/ΜL (ref 1.85–7.62)
NEUTS SEG NFR BLD AUTO: 91 % (ref 43–75)
NRBC BLD AUTO-RTO: 0 /100 WBCS
NT-PROBNP SERPL-MCNC: 346 PG/ML
PLATELET # BLD AUTO: 153 THOUSANDS/UL (ref 149–390)
PMV BLD AUTO: 11.5 FL (ref 8.9–12.7)
POTASSIUM SERPL-SCNC: 3.9 MMOL/L (ref 3.5–5.3)
PROT SERPL-MCNC: 7 G/DL (ref 6.4–8.2)
RBC # BLD AUTO: 4.68 MILLION/UL (ref 3.81–5.12)
SARS-COV-2 RNA RESP QL NAA+PROBE: NEGATIVE
SODIUM SERPL-SCNC: 140 MMOL/L (ref 136–145)
TROPONIN I SERPL-MCNC: <0.02 NG/ML
TSH SERPL DL<=0.05 MIU/L-ACNC: 0.65 UIU/ML (ref 0.36–3.74)
WBC # BLD AUTO: 10.05 THOUSAND/UL (ref 4.31–10.16)

## 2021-06-15 PROCEDURE — 99285 EMERGENCY DEPT VISIT HI MDM: CPT | Performed by: EMERGENCY MEDICINE

## 2021-06-15 PROCEDURE — 74177 CT ABD & PELVIS W/CONTRAST: CPT

## 2021-06-15 PROCEDURE — 85025 COMPLETE CBC W/AUTO DIFF WBC: CPT | Performed by: EMERGENCY MEDICINE

## 2021-06-15 PROCEDURE — 83880 ASSAY OF NATRIURETIC PEPTIDE: CPT | Performed by: EMERGENCY MEDICINE

## 2021-06-15 PROCEDURE — 84443 ASSAY THYROID STIM HORMONE: CPT | Performed by: EMERGENCY MEDICINE

## 2021-06-15 PROCEDURE — 84484 ASSAY OF TROPONIN QUANT: CPT | Performed by: INTERNAL MEDICINE

## 2021-06-15 PROCEDURE — 99285 EMERGENCY DEPT VISIT HI MDM: CPT

## 2021-06-15 PROCEDURE — 93005 ELECTROCARDIOGRAM TRACING: CPT

## 2021-06-15 PROCEDURE — 84484 ASSAY OF TROPONIN QUANT: CPT | Performed by: EMERGENCY MEDICINE

## 2021-06-15 PROCEDURE — 71275 CT ANGIOGRAPHY CHEST: CPT

## 2021-06-15 PROCEDURE — U0003 INFECTIOUS AGENT DETECTION BY NUCLEIC ACID (DNA OR RNA); SEVERE ACUTE RESPIRATORY SYNDROME CORONAVIRUS 2 (SARS-COV-2) (CORONAVIRUS DISEASE [COVID-19]), AMPLIFIED PROBE TECHNIQUE, MAKING USE OF HIGH THROUGHPUT TECHNOLOGIES AS DESCRIBED BY CMS-2020-01-R: HCPCS | Performed by: EMERGENCY MEDICINE

## 2021-06-15 PROCEDURE — 36415 COLL VENOUS BLD VENIPUNCTURE: CPT | Performed by: EMERGENCY MEDICINE

## 2021-06-15 PROCEDURE — 80053 COMPREHEN METABOLIC PANEL: CPT | Performed by: EMERGENCY MEDICINE

## 2021-06-15 PROCEDURE — 96374 THER/PROPH/DIAG INJ IV PUSH: CPT

## 2021-06-15 PROCEDURE — G1004 CDSM NDSC: HCPCS

## 2021-06-15 PROCEDURE — U0005 INFEC AGEN DETEC AMPLI PROBE: HCPCS | Performed by: EMERGENCY MEDICINE

## 2021-06-15 RX ORDER — FUROSEMIDE 40 MG/1
40 TABLET ORAL EVERY EVENING
Status: DISCONTINUED | OUTPATIENT
Start: 2021-06-16 | End: 2021-06-16

## 2021-06-15 RX ORDER — ACETAMINOPHEN 325 MG/1
500 TABLET ORAL EVERY 6 HOURS PRN
Status: DISCONTINUED | OUTPATIENT
Start: 2021-06-15 | End: 2021-06-21 | Stop reason: HOSPADM

## 2021-06-15 RX ORDER — HEPARIN SODIUM 5000 [USP'U]/ML
5000 INJECTION, SOLUTION INTRAVENOUS; SUBCUTANEOUS EVERY 8 HOURS SCHEDULED
Status: DISCONTINUED | OUTPATIENT
Start: 2021-06-16 | End: 2021-06-21 | Stop reason: HOSPADM

## 2021-06-15 RX ORDER — FUROSEMIDE 80 MG
80 TABLET ORAL DAILY
Status: DISCONTINUED | OUTPATIENT
Start: 2021-06-16 | End: 2021-06-16

## 2021-06-15 RX ORDER — ASPIRIN 81 MG/1
81 TABLET, CHEWABLE ORAL DAILY
Status: DISCONTINUED | OUTPATIENT
Start: 2021-06-16 | End: 2021-06-21 | Stop reason: HOSPADM

## 2021-06-15 RX ORDER — FUROSEMIDE 10 MG/ML
40 INJECTION INTRAMUSCULAR; INTRAVENOUS ONCE
Status: COMPLETED | OUTPATIENT
Start: 2021-06-15 | End: 2021-06-15

## 2021-06-15 RX ORDER — ONDANSETRON 2 MG/ML
4 INJECTION INTRAMUSCULAR; INTRAVENOUS EVERY 6 HOURS PRN
Status: DISCONTINUED | OUTPATIENT
Start: 2021-06-15 | End: 2021-06-21 | Stop reason: HOSPADM

## 2021-06-15 RX ORDER — B-COMPLEX WITH VITAMIN C
1 TABLET ORAL
Status: DISCONTINUED | OUTPATIENT
Start: 2021-06-16 | End: 2021-06-21 | Stop reason: HOSPADM

## 2021-06-15 RX ORDER — ATORVASTATIN CALCIUM 20 MG/1
20 TABLET, FILM COATED ORAL
Status: DISCONTINUED | OUTPATIENT
Start: 2021-06-16 | End: 2021-06-21 | Stop reason: HOSPADM

## 2021-06-15 RX ADMIN — IOHEXOL 100 ML: 350 INJECTION, SOLUTION INTRAVENOUS at 20:20

## 2021-06-15 RX ADMIN — FUROSEMIDE 40 MG: 10 INJECTION, SOLUTION INTRAMUSCULAR; INTRAVENOUS at 22:23

## 2021-06-15 NOTE — ED PROVIDER NOTES
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 on daily aspirin, known bifascicular block, chronic lower extremity lymphedema, hypertension, and GERD, who presents for evaluation of nausea, vomiting, diarrhea, and increased swelling in her legs  Patient states that during the day yesterday she developed diarrhea, has had approximately 3 episodes of watery loose stool over that period of time  Her eyesight is poor so she is unable to tell me if her stool was black but does not think that it was bloody  This morning she had 1 episode of vomiting, denies persistent nausea  She denies abdominal pain currently but did have abdominal pain yesterday that migrated her around her abdomen and has since subsided  Denies any pain in her chest   She does report shortness of breath whenever I exercise    She was noted to be hypoxic with SpO2 of 87% on room air on arrival   She does not use oxygen at home and denies history of lung disease or use of inhalers  Reports that the swelling in her legs has increased over the last few days  Denies pain in her legs  Prior to Admission Medications   Prescriptions Last Dose Informant Patient Reported? Taking?    Calcium Carbonate-Vitamin D (CALCIUM 500 + D) 500-125 MG-UNIT TABS  Self Yes Yes   Sig: Take by mouth   Cholecalciferol (VITAMIN D3) 2000 units TABS  Self Yes Yes   Sig: Take by mouth   Multiple Vitamin (MULTIVITAMIN) tablet  Self Yes Yes   Sig: Take 1 tablet by mouth daily   acetaminophen (TYLENOL) 500 mg tablet  Self Yes Yes   Sig: Take 500 mg by mouth every 6 (six) hours as needed for mild pain   anastrozole (ARIMIDEX) 1 mg tablet  Self Yes Yes   Sig: Take 1 mg by mouth daily aspirin 81 MG tablet  Self Yes Yes   Sig: Take by mouth   atorvastatin (LIPITOR) 20 mg tablet  Self Yes Yes   Sig: Take by mouth   calcium carbonate-vitamin D (OSCAL-D) 500 mg-200 units per tablet  Self Yes Yes   Sig: Take 1 tablet by mouth daily with breakfast   cyanocobalamin (VITAMIN B-12) 500 mcg tablet  Self Yes Yes   Sig: Take by mouth   furosemide (LASIX) 40 mg tablet  Self Yes Yes   Sig: Take 40 mg by mouth 2 (two) times a day   ketoconazole (NIZORAL) 2 % cream  Self Yes Yes   Sig: Apply topically daily   metoprolol tartrate (LOPRESSOR) 25 mg tablet  Self Yes Yes   Sig: Take by mouth      Facility-Administered Medications: None           Past Surgical History:   Procedure Laterality Date    APPENDECTOMY      BREAST BIOPSY  12/28/2015    CHOLECYSTECTOMY  2009    COLONOSCOPY  10/30/2014    HERNIA REPAIR Right     MAMMO NEEDLE LOCALIZATION RIGHT (ALL INC) Right 1/10/2013    MASTECTOMY Right 2013    MASTECTOMY Right 02/01/2013    TONSILLECTOMY      TONSILLECTOMY      US BREAST NEEDLE LOC LEFT Left 12/28/2015       Family History   Problem Relation Age of Onset    Stomach cancer Mother     Lung cancer Brother     Alzheimer's disease Brother     Parkinsonism Brother     Hypertension Brother     Heart disease Brother     Heart disease Father     Heart disease Other      I have reviewed and agree with the history as documented  E-Cigarette/Vaping    E-Cigarette Use Never User      E-Cigarette/Vaping Substances    Nicotine No     THC No     CBD No     Flavoring No     Other No     Unknown No      Social History     Tobacco Use    Smoking status: Former Smoker    Smokeless tobacco: Never Used    Tobacco comment: hasn't smoked since highschool age   [de-identified] Use    Vaping Use: Never used   Substance Use Topics    Alcohol use: No    Drug use: No       Review of Systems   Constitutional: Negative for chills and fever  HENT: Negative for congestion      Eyes: Negative for visual disturbance  Respiratory: Positive for shortness of breath  Negative for cough  Cardiovascular: Positive for leg swelling (increased from baseline)  Negative for chest pain  Gastrointestinal: Positive for abdominal pain (resolved), diarrhea, nausea and vomiting  Genitourinary: Negative for dysuria, flank pain and frequency  Musculoskeletal: Negative for arthralgias, back pain, neck pain and neck stiffness  Skin: Negative for rash  Neurological: Negative for weakness, numbness and headaches  Psychiatric/Behavioral: Negative for agitation, behavioral problems and confusion  Physical Exam  Physical Exam  Constitutional:       General: She is not in acute distress  Appearance: She is well-developed  She is not diaphoretic  HENT:      Head: Normocephalic and atraumatic  Right Ear: External ear normal       Left Ear: External ear normal       Nose: Nose normal    Eyes:      Conjunctiva/sclera: Conjunctivae normal    Neck:      Comments: kyphosis  Cardiovascular:      Rate and Rhythm: Regular rhythm  Tachycardia present  Pulses: Normal pulses  Heart sounds: Normal heart sounds  No murmur heard  No friction rub  No gallop  Pulmonary:      Effort: Pulmonary effort is normal  No respiratory distress  Breath sounds: No wheezing or rales  Comments: Soft crackles worse at the bases  Abdominal:      General: Bowel sounds are normal  There is no distension  Palpations: Abdomen is soft  Tenderness: There is no abdominal tenderness  There is no guarding  Comments: Abdomen benign to deep palpation   Musculoskeletal:         General: No deformity  Normal range of motion  Comments: 3+ edema to the bilateral LEs below the knees, no calf tenderness   Skin:     General: Skin is warm and dry  Neurological:      Mental Status: She is alert and oriented to person, place, and time  Motor: No abnormal muscle tone     Psychiatric:         Mood and Affect: Mood normal          Vital Signs  ED Triage Vitals   Temperature Pulse Respirations Blood Pressure SpO2   06/15/21 1941 06/15/21 1849 06/15/21 1849 06/15/21 1849 06/15/21 1849   97 8 °F (36 6 °C) (!) 111 20 104/58 (!) 87 %      Temp Source Heart Rate Source Patient Position - Orthostatic VS BP Location FiO2 (%)   06/15/21 1941 06/15/21 1849 06/15/21 1849 06/15/21 1849 --   Oral Monitor Lying Left arm       Pain Score       06/15/21 2301       2           Vitals:    06/15/21 1849 06/15/21 1930 06/15/21 2300 06/16/21 0013   BP: 104/58 96/55 96/60 122/71   Pulse: (!) 111 102 94 97   Patient Position - Orthostatic VS: Lying Lying Lying Lying         Visual Acuity      ED Medications  Medications   acetaminophen (TYLENOL) tablet 488 mg (has no administration in time range)   aspirin chewable tablet 81 mg (has no administration in time range)   atorvastatin (LIPITOR) tablet 20 mg (has no administration in time range)   calcium carbonate-vitamin D (OSCAL-D) 500 mg-200 units per tablet 1 tablet (has no administration in time range)   metoprolol tartrate (LOPRESSOR) tablet 12 5 mg (has no administration in time range)   furosemide (LASIX) tablet 40 mg (has no administration in time range)   furosemide (LASIX) tablet 80 mg (has no administration in time range)   ondansetron (ZOFRAN) injection 4 mg (has no administration in time range)   heparin (porcine) subcutaneous injection 5,000 Units (has no administration in time range)   iohexol (OMNIPAQUE) 350 MG/ML injection (SINGLE-DOSE) 100 mL (100 mL Intravenous Given 6/15/21 2020)   furosemide (LASIX) injection 40 mg (40 mg Intravenous Given 6/15/21 2223)       Diagnostic Studies  Results Reviewed     Procedure Component Value Units Date/Time    Troponin I [[id number]]  (Normal) Collected: 06/15/21 2332    Lab Status: Final result Specimen: Blood from Arm, Left Updated: 06/16/21 0014     Troponin I <0 02 ng/mL     Troponin I [[id number]]     Lab Status: No result Specimen: Blood Urinalysis with microscopic [[id number]]     Lab Status: No result Specimen: Urine     Lipase [[id number]]     Lab Status: No result Specimen: Blood     Novel Coronavirus [facility] [[id number]]  (Normal) Collected: 06/15/21 1923    Lab Status: Final result Specimen: Nares from Nose Updated: 06/15/21 2051     SARS-CoV-2 Negative    Narrative: The specimen collection materials, transport medium, and/or testing methodology utilized in the production of these test results have been proven to be reliable in a limited validation with an abbreviated program under the Emergency Utilization Authorization provided by the FDA  Testing reported as "Presumptive positive" will be confirmed with secondary testing to ensure result accuracy  Clinical caution and judgement should be used with the interpretation of these results with consideration of the clinical impression and other laboratory testing  Testing reported as "Positive" or "Negative" has been proven to be accurate according to standard laboratory validation requirements  All testing is performed with control materials showing appropriate reactivity at standard intervals        CBC and differential [[id number]]  (Abnormal) Collected: 06/15/21 1923    Lab Status: Final result Specimen: Blood from Arm, Left Updated: 06/15/21 2027     WBC 10 05 Thousand/uL      RBC 4 68 Million/uL      Hemoglobin 13 2 g/dL      Hematocrit 43 0 %      MCV 92 fL      MCH 28 2 pg      MCHC 30 7 g/dL      RDW 15 8 %      MPV 11 5 fL      Platelets 127 Thousands/uL      nRBC 0 /100 WBCs      Neutrophils Relative 91 %      Immat GRANS % 1 %      Lymphocytes Relative 1 %      Monocytes Relative 7 %      Eosinophils Relative 0 %      Basophils Relative 0 %      Neutrophils Absolute 9 14 Thousands/µL      Immature Grans Absolute 0 06 Thousand/uL      Lymphocytes Absolute 0 14 Thousands/µL      Monocytes Absolute 0 68 Thousand/µL      Eosinophils Absolute 0 00 Thousand/µL      Basophils Absolute 0 03 Thousands/µL     Narrative: This is an appended report  These results have been appended to a previously verified report  NT-BNP PRO [[id number]]  (Normal) Collected: 06/15/21 1923    Lab Status: Final result Specimen: Blood from Arm, Left Updated: 06/15/21 2013     NT-proBNP 346 pg/mL     TSH [[id number]]  (Normal) Collected: 06/15/21 1923    Lab Status: Final result Specimen: Blood from Arm, Left Updated: 06/15/21 2013     TSH 3RD GENERATON 0 649 uIU/mL     Narrative:      Patients undergoing fluorescein dye angiography may retain small amounts of fluorescein in the body for 48-72 hours post procedure  Samples containing fluorescein can produce falsely depressed TSH values  If the patient had this procedure,a specimen should be resubmitted post fluorescein clearance        Troponin I [[id number]]  (Normal) Collected: 06/15/21 1923    Lab Status: Final result Specimen: Blood from Arm, Left Updated: 06/15/21 2006     Troponin I <0 02 ng/mL     Comprehensive metabolic panel [[id number]]  (Abnormal) Collected: 06/15/21 1923    Lab Status: Final result Specimen: Blood from Arm, Left Updated: 06/15/21 2002     Sodium 140 mmol/L      Potassium 3 9 mmol/L      Chloride 103 mmol/L      CO2 28 mmol/L      ANION GAP 9 mmol/L      BUN 23 mg/dL      Creatinine 1 00 mg/dL      Glucose 159 mg/dL      Calcium 8 3 mg/dL      Corrected Calcium 9 1 mg/dL      AST 29 U/L      ALT 26 U/L      Alkaline Phosphatase 108 U/L      Total Protein 7 0 g/dL      Albumin 3 0 g/dL      Total Bilirubin 0 60 mg/dL      eGFR 53 ml/min/1 73sq m     Narrative:      [facility] guidelines for Chronic Kidney Disease (CKD):     Stage 1 with normal or high GFR (GFR > 90 mL/min/1 73 square meters)    Stage 2 Mild CKD (GFR = 60-89 mL/min/1 73 square meters)    Stage 3A Moderate CKD (GFR = 45-59 mL/min/1 73 square meters)    Stage 3B Moderate CKD (GFR = 30-44 mL/min/1 73 square meters)    Stage 4 Severe CKD (GFR = 15-29 mL/min/1 73 square meters)    Stage 5 End Stage CKD (GFR <15 mL/min/1 73 square meters)  Note: GFR calculation is accurate only with a steady state creatinine    Stool Enteric Bacterial Panel by PCR [[id number]]     Lab Status: No result Specimen: Stool from Rectum     Clostridium difficile toxin by PCR with EIA [[id number]]     Lab Status: No result Specimen: Stool from Per Rectum                  PE Study with CT abdomen & pelvis with contrast   Final Result by Kristie Townsend MD (06/15 2120)      No acute findings in the chest; no pulmonary arterial embolism or pulmonary infiltrate/consolidation  No acute inflammatory changes in the abdomen or pelvis  Workstation performed: [id number]         VAS lower limb venous duplex study, complete bilateral    (Results Pending)              Procedures  Procedures         ED Course                             SBIRT 22yo+      Most Recent Value   SBIRT (24 yo +)   In order to provide better care to our patients, we are screening all of our patients for alcohol and drug use  Would it be okay to ask you these screening questions? Unable to answer at this time Filed at: 06/15/2021 [facility]8                    [facility]  Number of Diagnoses or Management Options  Diarrhea: new and requires workup  Hypoxia: new and requires workup  Vomiting: new and requires workup  Diagnosis management comments: Nontoxic  Afebrile and hemodynamically stable  Patient is noted to be hypoxic, satting 87% on room air on arrival   She was started on 2 L of oxygen by nasal cannula and is now satting in the low 90s  She has soft crackles heard throughout all lung fields, no wheezing  Denies chest pain      I personally interpreted the patient's EKG which reveals sinus tachycardia to 104 beats per minute, left axis deviation, bifascicular block, T-wave inversions in the inferior leads and leads V1 through V4 without prior available for comparison, though per documentation in Care everywhere patient does have a prior history of bifascicular block  She denies chest pain but troponin was obtained in the setting of risk stratification which is undetectable  BNP not elevated  COVID-19 testing negative  Patient noted to have road lower extremity edema on exam   Lower extremity Doppler without evidence of DVT  CTA of the chest without PE or pneumonia  CT of the abdomen pelvis without inflammatory changes  Stool studies ordered given report of diarrhea  Shortness of breath with leg swelling possibly due to CHF  Most recent echo was in 2018 showing normal ejection fraction  Will give 40 mg of IV Lasix and admit for further management  Amount and/or Complexity of Data Reviewed  Clinical lab tests: ordered and reviewed  Tests in the radiology section of CPT®: ordered and reviewed  Review and summarize past medical records: yes  Discuss the patient with other providers: yes  Independent visualization of images, tracings, or specimens: yes    Patient Progress  Patient progress: stable           Disposition  Final diagnoses:   Hypoxia   Diarrhea   Vomiting     Time reflects when diagnosis was documented in both MDM as applicable and the Disposition within this note     Time User Action Codes Description Comment    6/15/2021 10:56 PM Shae Mckeon Add [R09 02] Hypoxia     6/15/2021 10:56 PM Donna Buckner [R19 7] Diarrhea     6/15/2021 10:56 PM Shae Linda Add [R11 10] Vomiting       ED Disposition     ED Disposition Condition Date/Time Comment    Admit Stable Tue Omar 15, 2021 10:56 PM Case was discussed with LISA and the patient's admission status was agreed to be Admission Status: observation status to the service of Dr Henrietta Rodriguez          Follow-up Information    None         Current Discharge Medication List      CONTINUE these medications which have NOT CHANGED    Details   acetaminophen (TYLENOL) 500 mg tablet Take 500 mg by mouth every 6 (six) hours as needed for mild pain      anastrozole (ARIMIDEX) 1 mg tablet Take 1 mg by mouth daily  Refills: 6      aspirin 81 MG tablet Take by mouth      atorvastatin (LIPITOR) 20 mg tablet Take by mouth      Calcium Carbonate-Vitamin D (CALCIUM 500 + D) 500-125 MG-UNIT TABS Take by mouth      calcium carbonate-vitamin D (OSCAL-D) 500 mg-200 units per tablet Take 1 tablet by mouth daily with breakfast      Cholecalciferol (VITAMIN D3) 2000 units TABS Take by mouth      cyanocobalamin (VITAMIN B-12) 500 mcg tablet Take by mouth      furosemide (LASIX) 40 mg tablet Take 40 mg by mouth 2 (two) times a day      ketoconazole (NIZORAL) 2 % cream Apply topically daily      metoprolol tartrate (LOPRESSOR) 25 mg tablet Take by mouth      Multiple Vitamin (MULTIVITAMIN) tablet Take 1 tablet by mouth daily           No discharge procedures on file      PDMP Review     None          ED Provider  Electronically Signed by           Matt Fonseca MD  06/16/21 3866

## 2021-06-16 ENCOUNTER — APPOINTMENT (INPATIENT)
Dept: NON INVASIVE DIAGNOSTICS | Facility: HOSPITAL | Age: 81
DRG: 291 | End: 2021-06-16
Payer: COMMERCIAL

## 2021-06-16 ENCOUNTER — APPOINTMENT (INPATIENT)
Dept: VASCULAR ULTRASOUND | Facility: HOSPITAL | Age: 81
DRG: 291 | End: 2021-06-16
Payer: COMMERCIAL

## 2021-06-16 PROBLEM — N18.30 CKD (CHRONIC KIDNEY DISEASE) STAGE 3, GFR 30-59 ML/MIN (HCC): Status: ACTIVE | Noted: 2021-06-16

## 2021-06-16 PROBLEM — K52.9 GASTROENTERITIS: Status: RESOLVED | Noted: 2021-06-15 | Resolved: 2021-06-16

## 2021-06-16 LAB
ANION GAP SERPL CALCULATED.3IONS-SCNC: 8 MMOL/L (ref 4–13)
ATRIAL RATE: 104 BPM
ATRIAL RATE: 92 BPM
BACTERIA UR QL AUTO: ABNORMAL /HPF
BILIRUB UR QL STRIP: NEGATIVE
BUN SERPL-MCNC: 21 MG/DL (ref 5–25)
CALCIUM SERPL-MCNC: 8.4 MG/DL (ref 8.3–10.1)
CAOX CRY URNS QL MICRO: ABNORMAL /HPF
CHLORIDE SERPL-SCNC: 104 MMOL/L (ref 100–108)
CK MB SERPL-MCNC: 1 NG/ML (ref 0–5)
CK MB SERPL-MCNC: <1 % (ref 0–2.5)
CK SERPL-CCNC: 168 U/L (ref 26–192)
CLARITY UR: CLEAR
CO2 SERPL-SCNC: 27 MMOL/L (ref 21–32)
COLOR UR: YELLOW
CREAT SERPL-MCNC: 0.66 MG/DL (ref 0.6–1.3)
GFR SERPL CREATININE-BSD FRML MDRD: 83 ML/MIN/1.73SQ M
GLUCOSE SERPL-MCNC: 100 MG/DL (ref 65–140)
GLUCOSE UR STRIP-MCNC: NEGATIVE MG/DL
HGB UR QL STRIP.AUTO: ABNORMAL
KETONES UR STRIP-MCNC: NEGATIVE MG/DL
LEUKOCYTE ESTERASE UR QL STRIP: ABNORMAL
LIPASE SERPL-CCNC: 75 U/L (ref 73–393)
NITRITE UR QL STRIP: NEGATIVE
NON-SQ EPI CELLS URNS QL MICRO: ABNORMAL /HPF
P AXIS: 28 DEGREES
P AXIS: 53 DEGREES
PH UR STRIP.AUTO: 5.5 [PH]
POTASSIUM SERPL-SCNC: 4.5 MMOL/L (ref 3.5–5.3)
PR INTERVAL: 156 MS
PR INTERVAL: 160 MS
PROT UR STRIP-MCNC: NEGATIVE MG/DL
QRS AXIS: -44 DEGREES
QRS AXIS: -56 DEGREES
QRSD INTERVAL: 130 MS
QRSD INTERVAL: 142 MS
QT INTERVAL: 342 MS
QT INTERVAL: 398 MS
QTC INTERVAL: 449 MS
QTC INTERVAL: 492 MS
RBC #/AREA URNS AUTO: ABNORMAL /HPF
SODIUM SERPL-SCNC: 139 MMOL/L (ref 136–145)
SP GR UR STRIP.AUTO: 1.02 (ref 1–1.03)
T WAVE AXIS: -18 DEGREES
T WAVE AXIS: -23 DEGREES
TROPONIN I SERPL-MCNC: <0.02 NG/ML
TROPONIN I SERPL-MCNC: <0.02 NG/ML
UROBILINOGEN UR QL STRIP.AUTO: 0.2 E.U./DL
VENTRICULAR RATE: 104 BPM
VENTRICULAR RATE: 92 BPM
WBC #/AREA URNS AUTO: ABNORMAL /HPF

## 2021-06-16 PROCEDURE — 93970 EXTREMITY STUDY: CPT | Performed by: SURGERY

## 2021-06-16 PROCEDURE — 81001 URINALYSIS AUTO W/SCOPE: CPT | Performed by: INTERNAL MEDICINE

## 2021-06-16 PROCEDURE — 83690 ASSAY OF LIPASE: CPT | Performed by: INTERNAL MEDICINE

## 2021-06-16 PROCEDURE — 99223 1ST HOSP IP/OBS HIGH 75: CPT | Performed by: HOSPITALIST

## 2021-06-16 PROCEDURE — 93005 ELECTROCARDIOGRAM TRACING: CPT

## 2021-06-16 PROCEDURE — 93010 ELECTROCARDIOGRAM REPORT: CPT | Performed by: INTERNAL MEDICINE

## 2021-06-16 PROCEDURE — 82550 ASSAY OF CK (CPK): CPT | Performed by: INTERNAL MEDICINE

## 2021-06-16 PROCEDURE — 93970 EXTREMITY STUDY: CPT

## 2021-06-16 PROCEDURE — 93306 TTE W/DOPPLER COMPLETE: CPT | Performed by: INTERNAL MEDICINE

## 2021-06-16 PROCEDURE — 97167 OT EVAL HIGH COMPLEX 60 MIN: CPT

## 2021-06-16 PROCEDURE — 93306 TTE W/DOPPLER COMPLETE: CPT

## 2021-06-16 PROCEDURE — 84484 ASSAY OF TROPONIN QUANT: CPT | Performed by: INTERNAL MEDICINE

## 2021-06-16 PROCEDURE — 87505 NFCT AGENT DETECTION GI: CPT | Performed by: EMERGENCY MEDICINE

## 2021-06-16 PROCEDURE — 87493 C DIFF AMPLIFIED PROBE: CPT | Performed by: EMERGENCY MEDICINE

## 2021-06-16 PROCEDURE — 82553 CREATINE MB FRACTION: CPT | Performed by: INTERNAL MEDICINE

## 2021-06-16 PROCEDURE — 97163 PT EVAL HIGH COMPLEX 45 MIN: CPT

## 2021-06-16 PROCEDURE — 80048 BASIC METABOLIC PNL TOTAL CA: CPT | Performed by: INTERNAL MEDICINE

## 2021-06-16 PROCEDURE — B24BZZZ ULTRASONOGRAPHY OF HEART WITH AORTA: ICD-10-PCS | Performed by: INTERNAL MEDICINE

## 2021-06-16 RX ORDER — FUROSEMIDE 10 MG/ML
80 INJECTION INTRAMUSCULAR; INTRAVENOUS
Status: DISCONTINUED | OUTPATIENT
Start: 2021-06-16 | End: 2021-06-17

## 2021-06-16 RX ADMIN — HEPARIN SODIUM 5000 UNITS: 5000 INJECTION INTRAVENOUS; SUBCUTANEOUS at 13:11

## 2021-06-16 RX ADMIN — Medication 1 TABLET: at 07:43

## 2021-06-16 RX ADMIN — ATORVASTATIN CALCIUM 20 MG: 20 TABLET, FILM COATED ORAL at 17:31

## 2021-06-16 RX ADMIN — Medication 12.5 MG: at 09:11

## 2021-06-16 RX ADMIN — ASPIRIN 81 MG: 81 TABLET, CHEWABLE ORAL at 09:11

## 2021-06-16 RX ADMIN — FUROSEMIDE 80 MG: 80 TABLET ORAL at 07:43

## 2021-06-16 RX ADMIN — HEPARIN SODIUM 5000 UNITS: 5000 INJECTION INTRAVENOUS; SUBCUTANEOUS at 21:07

## 2021-06-16 RX ADMIN — HEPARIN SODIUM 5000 UNITS: 5000 INJECTION INTRAVENOUS; SUBCUTANEOUS at 06:16

## 2021-06-16 NOTE — ASSESSMENT & PLAN NOTE
Results from last 7 days   Lab Units 06/16/21  0542 06/15/21  1923   BUN mg/dL 21 23   CREATININE mg/dL 0 66 1 00     Creatinine slightly above baseline on initial presentation  Improved today  Continue to monitor for contrast-induced nephropathy as patient has CT of chest/abd/and pelvis with radiocontrast yesterday

## 2021-06-16 NOTE — ASSESSMENT & PLAN NOTE
· SpO2 dropped to 87% on admission requiring 2 L nasal cannula  · She reports exertion shortness of breath and worsening leg swelling  She does have history of lymphedema , non occlusive DVT and diastolic dysfunction  · Per chart review : Adenosine Cardiolite Stress Test 4/25/14: the stress test was inconclusive for myocardial ischemia  · Per PCP previous notes : Echo 11/28/2018: Ejection fraction 65% , Left ventricular wall thickness top normal to mildly thickened , Left ventricular diastolic dysfunction , Left atrium mildly to moderately enlarged and mild tricuspid regurgitation    Workup done:  · CTA chest negative for PE , pleural effusion , consolidations and pulmonary edema  · EKG noted for sinus tachycardia , bifascicular block and T inversion in multiple leads  No prior EKG to compare however per chart review does mention that she has history of bifascicular block  · ProBNP 346  · Troponin 0 02x1  · COVID-19 is negative  · Hemoglobin 13 2  DDx : Deconditioning , Acute heart failure , ACS , 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

## 2021-06-16 NOTE — ASSESSMENT & PLAN NOTE
· BP range last 24 hours: ()/(55-87) 118/87  · Continue home dose metoprolol and lasix with holding parameters

## 2021-06-16 NOTE — ASSESSMENT & PLAN NOTE
History of chronic keg swelling 2/2 lymphedema and chronic occlusive DVT of right paired peroneal veins  Worsening over the last 3 days despite compliance with home Lasix regimen  Suspect that leg swelling is secondary to HF and possibly hypoalbuminemia  Plan:  · Echo to r/o cardiac etiology - completed but results pending  · Update venous duplex of BLE given hx of DVT  · Check UA to rule out proteinuria  · Compression stockings    · Low-salt diet  · Lasix 80 mg IV BID

## 2021-06-16 NOTE — PLAN OF CARE
Problem: OCCUPATIONAL THERAPY ADULT  Goal: Performs self-care activities at highest level of function for planned discharge setting  See evaluation for individualized goals  Description: Treatment Interventions: ADL retraining, Functional transfer training, UE strengthening/ROM, Endurance training, Patient/family training, Equipment evaluation/education, Continued evaluation, Energy conservation, Activityengagement  Equipment Recommended: Bedside commode, Shower/Tub chair with back ($), Reacher ($), Sock aid ($)       See flowsheet documentation for full assessment, interventions and recommendations  Note: Limitation: Decreased ADL status, Decreased UE strength, Decreased endurance, Decreased high-level ADLs, Visual deficit     Assessment: Pt is a 78 y o  female  [facility]  6/15/2021 d/t abdominal pain and leg swelling  Pt diagnosed w/ acute respiratory failure w/ hypoxia, gastroenteritis like symptoms  OT and up w/ A orders are documented  The pt's significant PMH impacting occupational performance includes chronic occlusive DVT, LLE lymphedema, HTN, chronic diastolic dysfunction, breast cancer, R mastectomy, scoliosis  PTA pt lived in [address] [employer]  Pt lived on one floor, dining room is on same floor  Pt's bathroom has gbs, shower chair, raised toilet w/ gbs  Pt receives A w/ bathing, and portions of LBD  Pt reports I w/ grooming, UBD, portions of LBD  Pt receives meals from facility dining room  Pt does report the distance from room to dining room is large and will walk it w/ I  Deficit areas limiting the patient currently includes increased fatigue and decreased standing tolerance/endurance  Personal factors impacting pt currently includes visual deficits premorbidly, advanced age, A at baseline and limited family support  Currently the pt is engaging in bed mobility w/ min A, sit > stand w/ mod A although progressed to min A   Pt demonstrated stand > sit w/ min A with cues for hand placement, ambulation w/ min A using RW  Pt reports ambulation is around baseline  Pt is currently functioning below baseline level of I and would benefit from occupational therapy services while admitted in acute care  When pt is medically stable, recommendation for discharge is return to [facility] (vs  Less likely postacute rehab) pending consistent acute care functional mobility progress, increased support from facility staff  OT will follow while in acute care        OT Discharge Recommendation: Return to facility with rehabilitation services (vs  less likely rehab pend consistent fxal mob)

## 2021-06-16 NOTE — PHYSICAL THERAPY NOTE
PHYSICAL THERAPY EVALUATION NOTE          Patient Name: Jay Mendoza  HCRMG'A Date: 6/16/2021       AGE:   78 y o  Mrn:   [id number]  ADMIT DX:  Diarrhea [R19 7]  Vomiting [R11 10]  Abdominal pain [R10 9]  Hypoxia [R09 02]    Past Medical History:   Diagnosis Date    Acid reflux     Breast cancer ([facility] )     Armidex    Breast cancer ([facility] )     DVT (deep venous thrombosis) ([place] [id number] ) 07/2016    subacute/chronic dvt on the right side in     Edema     chronic leg edema, PSBO 12/31/2015    Heart failure ([facility] )     History of echocardiogram 07/01/2016    Transthoracic Echocardiogram 7/1/16: LV normal size left ventricle  no LVH  normal global systolic LV function (EF 19%)  no gross regional wall motion abnormality  normal diastolic LV function  RV normal size  no RVH   normal RV function  Left atrium is normal in size  normal appearing atrial septum  Right atrium is normal in size  normal appearing atrial septum  mitral valve has mild thickenin    History of EKG 11/01/2017    *EKG 11/1/17: normal sinus rhythm; RBBB, left anterior fascicular block, bifascicular block; moderate voltage criteria for LVH, may be normal variant, abnormal ECG EKG 5/26/17: normal sinus rhythm, RBBB, left anterior fascicular block, bifascicular block; moderate voltage criteria for LVH, may be normal variant; abnormal ECG  EKG 3/1/17: normal sinus rhythm; RBBB, left anterior fascicular bloc    History of stress test 04/25/2014    Adenosine Cardiolite Stress Test 4/25/14: EKG portion- 1) the stress test was inconclusive for myocardial ischemia due to baseline EKG abnormalities 2) physiologic response to adenosine infusion 3) cardiolite scan is attached  Conclusion: Very mildly abnormal Adenosine Cardiolite SPECT study  there is a small fixed mild defect in the left ventricular apex  there is no reversible ischemia   diffe    Hyperlipidemia     Hypertension     Hypertension     Leg edema     chronic     Lymphedema     Osteopenia     Peripheral vascular disease (HCC)     Scoliosis     Sleep apnea     Thrombocytopenia (HCC)     Vitamin D deficiency      Length Of Stay: 0  PHYSICAL THERAPY EVALUATION :   Patient's identity confirmed via 2 patient identifiers (full name and ) at start of session       21 0807   PT Last Visit   PT Visit Date 21   Note Type   Note type Evaluation   Pain Assessment   Pain Assessment Tool Pain Assessment not indicated - pt denies pain   Pain Score No Pain   Home Living   Type of Home Other (Comment)  ([facility])   Home Layout Performs ADLs on one level; One level   Bathroom Shower/Tub Tub/shower unit   Bathroom Toilet Standard   Bathroom Equipment Shower chair   [facility]  (RW)   Prior Function   Level of [place] Other (Comment)  (Ind  w/ amb w/ RW)   Lives With Facility staff   Receives Help From Other (Comment)  (facility staff)   ADL Assistance Needs assistance   IADLs Needs assistance  (w/ bathing and dressing)   Falls in the last 6 months 0   Vocational Retired   Restrictions/Precautions   [employer] Bearing Precautions Per Order No   Other Precautions Chair Alarm; Bed Alarm;O2;Telemetry;Contact/isolation; Fall Risk  (2L O2 via NC)   General   Family/Caregiver Present No   Cognition   Overall Cognitive Status WFL   Arousal/Participation Alert   Orientation Level Oriented X4   Memory Within functional limits   Following Commands Follows one step commands without difficulty   Comments pt ID via name and ; pt agreeable to PT eval   Strength RLE   RLE Overall Strength 3+/5   Strength LLE   LLE Overall Strength 3+/5   Coordination   Movements are Fluid and Coordinated 0   Coordination and Movement Description pt mvmts are gaurded   Sensation X  (pt reports h/o neuropathy w/ shooting pain down leg)   Light Touch   RLE Light Touch Grossly intact   LLE Light Touch Grossly intact   Bed Mobility   Supine to Sit 4  Minimal assistance   Additional items Assist x 1;HOB elevated; Increased time required;Verbal cues;LE management   Sit to Supine Unable to assess   Transfers   Sit to Stand 3  Moderate assistance   Additional items Assist x 1; Increased time required;Verbal cues   Stand to Sit 4  Minimal assistance   Additional items Assist x 1; Armrests; Increased time required;Verbal cues  (VC for hand placement)   Additional Comments Pt w/ kyphotic posture in sitting and standing which pt said is chronic; VC for hand placement   Ambulation/Elevation   Gait pattern Decreased foot clearance; Short stride; Excessively slow; Forward Flexion   Gait Assistance 4  Minimal assist   Additional items Assist x 1  (progressing to close supervision)   Assistive Device Rolling walker   Distance 4' + 36'   Curbs VC for RW management, increase step height   Balance   Static Sitting Fair -   Dynamic Sitting Poor +   Static Standing Poor +  (w/ RW)   Dynamic Standing Poor +  (w/ RW)   Ambulatory Poor +  (progressing to Fair - w/ RW)   Endurance Deficit   Endurance Deficit Yes   Endurance Deficit Description pt limited in ambulation distance due to fatigue  (pt denied SOB, GOFF throughout eval)   Activity Tolerance   Activity Tolerance Patient limited by fatigue  (pt denied SOB throughout eval)   Medical Staff [address] [place] [address] Resident Cammy Velasquez for ok to evaluate/mobilize pt prior to VAS doppler studies w/ approval given; OT Angel Jarvis, OT student Jamey Maldonado   Nurse Made Aware EVELYNE Gray confirmed pt appropriate for PT eval; post session pt OOB in recliner chair, chair alarm on, LE elevated, SCDs applied, and all needs w/in reach; RN updated post   Assessment   Prognosis Fair   Problem List Decreased strength;Decreased endurance; Impaired balance;Decreased mobility;Obesity; Decreased coordination;Decreased safety awareness   Assessment Alan Solorio is a 78 y o   Female who [facility] on 6/15/2021 from [address] w/ c/o abdominal pain, leg swelling and diagnosis of acute respiratory failure w/ hypoxia  Orders for PT eval and treat received, w/ activity orders of up w/ A w/ fall risk and strict contact/hand hygiene precautions  Pt presents w/ comorbidities of HTN, lymphedema, chronic DVT of RLE and personal factors including: mobilizing w/ assistive device, inability to navigate community distances and requiring assistance w/ ADLs and IADLs  At baseline, pt mobilizes independently w/ RW, and reports 0 falls in the last 6 months  Upon evaluation, pt presents w/ the following deficits: weakness, impaired coordination, impaired balance, decreased endurance and gait deviations  Pt requires Min Ax1 for bed mobility, Mod Ax1-Min Ax1 for transfers, and Min Ax1 progressing to close supervision for gait  Pt's clinical presentation is unstable/unpredictable due to need for assist w/ all phases of mobility when usually mobilizing independently, limited ambulation endurance, ongoing medical monitoring/management, fall risk, and need for input for mobility technique/safety  Pt is at an increased risk of falls due to impaired balance, decreased safety awareness, decreased LE strength, and requirement of assistance w/ ambulation  Given the above findings, discharge recommendation is for post acute rehab  During this admission, pt would benefit from skilled acute inpatient PT in order to address the abovementioned deficits to maximize function and mobility before DC from acute care      Goals   Patient Goals to walk independently and play golf   STG Expiration Date 06/26/21   Short Term Goal #1 Pt will: perform bed mobility independently to decrease caregiver burden; perform STS transfers independently to increase OOB mobility; ambulate at least 200' w/ least restrictive device and supervision to increase pt ambulation tolerance; increase LE strength by 1 grade to increase pt tolerance to physical activity; increase all balance ratings by 1 grade to decrease pt risk of falls   PT Treatment Day 0   Plan   Treatment/Interventions Functional transfer training;LE strengthening/ROM; Therapeutic exercise; Endurance training;Patient/family training;Equipment eval/education; Bed mobility;Gait training   PT Frequency Other (Comment)  (3-5x/wk)   Recommendation   PT Discharge Recommendation Post acute rehabilitation services   Equipment Recommended [facility] Recommended Wheeled walker   Change/add to [place]? No   AM-PAC Basic Mobility Inpatient   Turning in Bed Without Bedrails 3   Lying on Back to Sitting on Edge of Flat Bed 3   Moving Bed to Chair 3   Standing Up From Chair 3   Walk in Room 3   Climb 3-5 Stairs 1   Basic Mobility Inpatient Raw Score 16   Basic Mobility Standardized Score 38 32       The patient's AM-PAC Basic Mobility Inpatient Short Form Raw Score is 16, Standardized Score is 38 32  A standardized score less than 42 9 suggests the patient may benefit from discharge to post-acute rehabilitation services which may not  coincide with above PT recommendations  However please refer to therapist recommendation for discharge planning given other factors that may influence destination  Pt would benefit from skilled inpatient PT during this admission in order to facilitate progress towards goals to maximize functional independence    Co-eval w/ OT due to anticipated assistance w/ functional mobility and pt's ongoing medical monitoring/mangement w/ independent assessments made and goals set      DC rec: Post acute rehab      Cecilio Lam, PT  06/16/21

## 2021-06-16 NOTE — ASSESSMENT & PLAN NOTE
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  Suspect that respiratory failure is likely related to CHF vs pulmonary HTN (hx of untreated DAVE)  Follows with Dr Rebekah Gilliland at [facility] for cardiology  · CT chest PE study negative  · NT-proBNP wnl  · Troponin negative  · Echo 11/28/2018 ([facility]) - LVEF 65%, LV wall thickness normal to mildly dilated   LV diastolic dysfunction (no grade provided), LA mildly to moderately enlarged, mild tricuspid regurgitation    Plan:  · Diuresis with Lasix 80 mg IV BID  · Recheck BMP in AM to monitor renal function and electrolytes  · Will update venous duplex of LE due to hx of DVT  · Echocardiogram - completed but results pending  · Supplemental O2 to maintain SpO2 > 90%

## 2021-06-16 NOTE — OCCUPATIONAL THERAPY NOTE
Occupational Therapy Evaluation     Patient Name: Francisco Blas  EHUVIKEILA Date: 6/16/2021  Problem List  Principal Problem:    Acute respiratory failure with hypoxia ([address] )  Active Problems:    DAVE (obstructive sleep apnea)    Essential hypertension    Leg swelling    Gastroenteritis like symptoms    CKD (chronic kidney disease) stage 3, GFR 30-59 ml/min [facility])    Past Medical History    Past Surgical History  Past Surgical History:   Procedure Laterality Date    APPENDECTOMY      BREAST BIOPSY  12/28/2015    CHOLECYSTECTOMY  2009    COLONOSCOPY  10/30/2014    HERNIA REPAIR Right     MAMMO NEEDLE LOCALIZATION RIGHT (ALL INC) Right 1/10/2013    MASTECTOMY Right 2013    MASTECTOMY Right 02/01/2013    TONSILLECTOMY      TONSILLECTOMY      US BREAST NEEDLE LOC LEFT Left 12/28/2015 06/16/21 0839   OT Last Visit   OT Visit Date 06/16/21  (Wednesday)   Note Type   Note type Evaluation   Restrictions/Precautions   Weight Bearing Precautions Per Order No   Other Precautions Limb alert;Visual impairment; Fall Risk;Telemetry;O2;Bed Alarm; Chair Alarm;Contact/isolation  (2L of O2 via NC, Rule out C diff, R limb alert)   Pain Assessment   Pain Assessment Tool Pain Assessment not indicated - pt denies pain   Home Living   Type of Home Assisted living  ([facility])   Home Layout Performs ADLs on one level; Able to live on main level with bedroom/bathroom; One level; Access   Bathroom Shower/Tub Tub/shower unit   [employer] chair;Grab bars around toilet;Grab bars in shower   [address] Walker;Grab bars  (RW, adjustable bed)   Prior Function   Level of [place] Needs assistance with IADLs   Lives With Facility staff   Receives Help From   (Facility staff)   ADL Assistance Needs assistance   IADLs Needs assistance   Falls in the last 6 months 0   Vocational Retired   Comments Pt recieves staff help for bathing and donning socks in AM, facility A w/ meal prep, laundry, and cleaning  Pt reports she is I w/ UBD dressing, grooming  Pt reports using RW to/from activities and meals    Lifestyle   Autonomy Pt I w/ UBD, grooming tasks  Able to preform part of LBD although needs A for socks   Reciprocal Relationships Pt has a niece who has a house in [place], [place]  Niece visits occasionally   Service to Others Pt worked in office work w/ management payments for [facility] Pt reports liking to attend Gar[employer] and art and crafts at [employer]  Pt enjoys playing [employer] ball  Pt used to like to golf  Psychosocial   Psychosocial (WDL) WDL   Subjective   Subjective "I dont notice when I am out of breath, you will have to tell me"   ADL   Where Assessed Supine, bed  (vs  Chair)   Eating Assistance 6  Modified independent   Eating Deficit Increased time to complete   Grooming Assistance 6  Modified Independent  (supine in bed)   Grooming Deficit Increased time to complete;Setup   UB Bathing Assistance   (NT)   LB Bathing Assistance   (NT)   UB Dressing Assistance 4  Minimal Assistance   UB Dressing Deficit Setup;Verbal cueing;Supervision/safety; Increased time to complete   LB Dressing Assistance 3  Moderate Assistance   LB Dressing Deficit Setup;Verbal cueing;Supervision/safety; Increased time to complete; Don/doff R sock; Don/doff L sock   Toileting Assistance    (NT  Denied need to void)   Additional Comments Pt required A for UBD for VCs and oxygen wiring management  Pt requires A for LBD, pt reported PTA baseline she was receiving A to don socks   Bed Mobility   Supine to Sit 4  Minimal assistance   Additional items Assist x 1;HOB elevated; Bedrails; Increased time required;Verbal cues   Additional Comments Upon arrival, pt supine in bed w/ HOB elevated   End eval, pt seated in chair w/ legs elevated, call bell, phone, and personal phone within reach, chair alarm activated, and needs met   Transfers   Sit to Stand 3 Moderate assistance  (mod Ax1 progressed to min A x1)   Additional items Assist x 1; Armrests; Increased time required;Verbal cues   Stand to Sit 4  Minimal assistance   Additional items Assist x 1; Armrests; Increased time required;Verbal cues   Toilet transfer   (NT  Will continue to assess)   Additional Comments Pt intially required mod x1 for sit > stand, as patient increased mobility/activity progressed to min Ax1   Functional Mobility   Functional Mobility 4  Minimal assistance   Additional Comments Pt engaged in functional mobility in hospital room w/ foward flexed head/neck, kyphotic posture using RW, + time, w/o LOB     Additional items Rolling walker   Balance   Static Sitting Fair   Static Standing Fair -   Ambulatory Poor +   Activity Tolerance   Activity Tolerance Patient limited by fatigue   Medical Staff Made Aware care coordinated w/ PT, Vanessa   Nurse Made Aware RN, Emeka Colin, appropriate to see pt   RUE Assessment   RUE Assessment WFL   RUE Strength   RUE Overall Strength Within Functional Limits - able to perform ADL tasks with strength   LUE Assessment   LUE Assessment WFL   LUE Strength   LUE Overall Strength Within Functional Limits - able to perform ADL tasks with strength  (LUE grasp decreased strength, functional)   Hand Function   Gross Motor Coordination Functional   Fine Motor Coordination Functional   Sensation   Light Touch   (responded to light touch BUE)   Vision-Basic Assessment   Current Vision Wears glasses all the time   Patient Visual Report   (reports difficulty w/ vision)   Cognition   Overall Cognitive Status [facility]   Arousal/Participation Cooperative; Alert   Attention Within functional limits   Orientation Level Oriented to person;Oriented to place;Oriented to situation   Memory Within functional limits  (+ cues to recall why in hospital)   Following Commands Follows one step commands without difficulty   Comments pt identified w/ full name,    Pt able to participate in conversation, appropriate recall of past evernts, able to recall social history, ability to follow directions w/ + time,    Assessment   Limitation Decreased ADL status; Decreased UE strength;Decreased endurance;Decreased high-level ADLs; Visual deficit   Assessment Pt is a 78 y o  female  Pt admitted [facility] on  6/15/2021 d/t abdominal pain and leg swelling  Pt diagnosed w/ acute respiratory failure w/ hypoxia, gastroenteritis like symptoms  OT and up w/ A orders are documented  The pt's significant PMH impacting occupational performance includes chronic occlusive DVT, LLE lymphedema, HTN, chronic diastolic dysfunction, breast cancer, R mastectomy, scoliosis  PTA pt [facility] in [facility]  Pt lived on one floor, dining room is on same floor  Pt's bathroom has gbs, shower chair, raised toilet w/ gbs  Pt receives A w/ bathing, and portions of LBD  Pt reports I w/ grooming, UBD, portions of LBD  Pt receives meals from facility dining room  Pt does report the distance from room to dining room is large and will walk it w/ I  Deficit areas limiting the patient currently includes increased fatigue and decreased standing tolerance/endurance  Personal factors impacting pt currently includes visual deficits premorbidly, advanced age, A at baseline and limited family support  Currently the pt is engaging in bed mobility w/ min A, sit > stand w/ mod A although progressed to min A  Pt demonstrated stand > sit w/ min A with cues for hand placement, ambulation w/ min A using RW  Pt reports ambulation is around baseline  Pt is currently functioning below baseline level of I and would benefit from occupational therapy services while admitted in acute care  When pt is medically stable, recommendation for discharge is return to custodial (vs  Less likely postacute rehab) pending consistent acute care functional mobility progress, increased support from facility staff  OT will follow while in acute care      Goals   Patient Goals to walk independently   Plan   Treatment Interventions ADL retraining;Functional transfer training;UE strengthening/ROM; Endurance training;Patient/family training;Equipment evaluation/education;Continued evaluation; Energy conservation; Activityengagement   Goal Expiration Date 06/21/21   OT Frequency 2-3x/wk   Recommendation   OT Discharge Recommendation Return to facility with rehabilitation services  (vs  less likely rehab pend consistent fxal mob)   Equipment Recommended Bedside commode; Shower/Tub chair with back ($);Reacher ($);Sock aid ($)   Commode Type Standard   AM-PAC Daily Activity Inpatient   Lower Body Dressing 3   Bathing 2   Toileting 3   Upper Body Dressing 3   Grooming 3   Eating 4   Daily Activity Raw Score 18   Daily Activity Standardized Score (Calc for Raw Score >=11) 38 66   AM-PAC Applied Cognition Inpatient   Following a Speech/Presentation 3   Understanding Ordinary Conversation 4   Taking Medications 3   Remembering Where Things Are Placed or Put Away 3   Remembering List of 4-5 Errands 3   Taking Care of Complicated Tasks 3   Applied Cognition Raw Score 19   Applied Cognition Standardized Score 39 77   Barthel Index   Feeding 10   Bathing 0   Grooming Score 5   Dressing Score 5   Bladder Score 10   Bowels Score 5   Toilet Use Score 5   Transfers (Bed/Chair) Score 10   Mobility (Level Surface) Score 0   Stairs Score 0   Barthel Index Score 50         Bed mobility:    -Pt will demonstrate bed mobility supine <> sit w/ S to return home and increase  I w/ ADLs      Functional Mobility/Transfers:    -Pt will engage in functional mobility of household distances using least restrictive device w/ S to engage in prior activity such as bocce ball, walking to dining room, and increase I w/  ADLs    -Pt will actively engage and initiate functional transfers to chair, bed, and chair w/ S to increase I w/ ADLs and reduce burden of care    -Pt will actively attend and engage in education of pacing and energy conservation w/ 2 verbal prompts or cues to optimize occupational performance and engage activities of paola ball, bingo, and crafts at [facility]      ADL tasks:    -Pt will engage in LBD w/ min A using LHAE as needed to increase I w/ ADLs and reduce burden of care    -Pt will increase standing balance to fair and standing tolerance to 3-5 minutes while engaged in ADL task and reduce fall risk    -Pt will acknowledge and participate in continued assessment of toileting tasks to assist w/ dc planning      Cognition:    -Pt will consistently follow and accurately follow 2-step directions w/o additional vcs or prompts to complete ADL tasks and optimize ADL performance      Marielizabet Patch , OTS

## 2021-06-16 NOTE — ASSESSMENT & PLAN NOTE
Lab Results   Component Value Date    EGFR 53 06/15/2021    CREATININE 1 00 06/15/2021   · Kidney function looks worsening from prior labs in 2020  · Daily BMP while inpatient - monitor for contrast induced nephropathy  · Avoid hypotension and nephrotoxins as possible

## 2021-06-16 NOTE — ASSESSMENT & PLAN NOTE
· Reports nausea, vomiting, abdominal cramps and 3 episodes of diarrhea yesterday  Currently resolved  · Electrolytes are within normal  No leucocytosis or fever  · On physical exam she does not look dehydrated  · CT abdomen is unremarkable  Plan:  · Stool studies sent by ED provider  Will follow results  · Check lipase  · Advance diet as tolerated  · Zofran p r n  For nausea  · Will hold on IV fluids due to concerns for acute heart failure     · Monitor BMP and replete electrolytes as needed

## 2021-06-16 NOTE — ASSESSMENT & PLAN NOTE
Untreated DAVE  Stopped CPAP as the mask was causing problems with eye swelling  Refusing CPAP during admission

## 2021-06-16 NOTE — ASSESSMENT & PLAN NOTE
· History of chronic lymphedema, patient said that her leg swelling has worsened the last 3 days  · Continue home dose furosemide 80/40  Given IV Lasix 40 mg in ED  · CT abdomen is negative for ascites or significant liver disease  · Hypoalbuminemia is contributing  Plan:  · Will obtain echocardiogram to rule out cardiac etiology  · Check Doppler ultrasound to rule out DVT ( has history of breast cancer and nonocclusive DVT)  · Check UA to rule out proteinuria  · Check CK level  · Compression stockings  · Low-salt diet

## 2021-06-16 NOTE — PROGRESS NOTES
[facility]  Progress Note - Kathleensara Wyatt 1940, 78 y o  female MRN: [id number]  Unit/Bed#: S -01 Encounter: [id number]  Primary Care Provider: Kelsey Medina MD   Date and time admitted to hospital: 6/15/2021  6:45 PM    * Acute respiratory failure with hypoxia [facility])  Assessment & Plan  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  Suspect that respiratory failure is likely related to CHF vs pulmonary HTN (hx of untreated DAVE)  Follows with Dr Bertin Pathak at [facility] for cardiology  · CT chest PE study negative  · NT-proBNP wnl  · Troponin negative  · Echo 11/28/2018 ([facility]) - LVEF 65%, LV wall thickness normal to mildly dilated  LV diastolic dysfunction (no grade provided), LA mildly to moderately enlarged, mild tricuspid regurgitation    Plan:  · Diuresis with Lasix 80 mg IV BID  · Recheck BMP in AM to monitor renal function and electrolytes  · Will update venous duplex of LE due to hx of DVT  · Echocardiogram - completed but results pending  · Supplemental O2 to maintain SpO2 > 90%    Leg swelling  Assessment & Plan  History of chronic keg swelling 2/2 lymphedema and chronic occlusive DVT of right paired peroneal veins  Worsening over the last 3 days despite compliance with home Lasix regimen  Suspect that leg swelling is secondary to HF and possibly hypoalbuminemia  Plan:  · Echo to r/o cardiac etiology - completed but results pending  · Update venous duplex of BLE given hx of DVT  · Check UA to rule out proteinuria  · Compression stockings    · Low-salt diet  · Lasix 80 mg IV BID      Gastroenteritis like symptoms-resolved as of 6/16/2021  Assessment & Plan  Reports nausea, vomiting, abdominal cramps and 3 episodes of diarrhea day prior to admission  Currently resolved  Electrolytes are within normal  No leucocytosis or fever  On physical exam she does not look dehydrated  CT abdomen is unremarkable  Lipase normal  Trop negative  Plan:  · Stool studies ordered but not collected as she has not had any additional diarrhea  · Advance diet as tolerated  · Zofran p r n  For nausea  · Will hold on IV fluids due to concerns for acute heart failure  · Monitor BMP and replete electrolytes as needed    CKD (chronic kidney disease) stage 3, GFR 30-59 ml/min ([facility])  Assessment & Plan  Results from last 7 days   Lab Units 06/16/21  0542 06/15/21  1923   BUN mg/dL 21 23   CREATININE mg/dL 0 66 1 00     Creatinine slightly above baseline on initial presentation  Improved today  Continue to monitor for contrast-induced nephropathy as patient has CT of chest/abd/and pelvis with radiocontrast yesterday  Essential hypertension  Assessment & Plan  · BP range last 24 hours: ()/(55-87) 118/87  · Continue home dose metoprolol and lasix with holding parameters    DAVE (obstructive sleep apnea)  Assessment & Plan  Untreated DAVE  Stopped CPAP as the mask was causing problems with eye swelling  Refusing CPAP during admission  VTE Pharmacologic Prophylaxis:   Pharmacologic: Heparin  Mechanical VTE Prophylaxis in Place: Yes    Discussions with Specialists or Other Care Team Provider: nursing, case management,    Education and Discussions with Family / Patient: Updated patient regarding results and plan of care  Will call and update family    Current Length of Stay: 0 day(s)    Current Patient Status: Inpatient     Discharge Plan / Estimated Discharge Date: TBD, patient is from [facility] and will return there upon discharge    Code Status: Level 3 - DNAR and DNI      Subjective:   Patient was seen and examined at bedside   No acute events overnight  No complaints this morning  Denies fever, chills, chest pain, or shortness of breath  No abdominal pain  N/V/D have resolved  No urinary symptoms    Objective:     Vitals:   Temp (24hrs), Av 1 °F (36 7 °C), Min:97 5 °F (36 4 °C), Max:98 5 °F (36 9 °C)    Temp:  [97 5 °F (36 4 °C)-98 5 °F (36 9 °C)] 98 4 °F (36 9 °C)  HR:  [] 85  Resp:  [14-20] 14  BP: ()/(55-87) 118/87  SpO2:  [87 %-96 %] 96 %  Body mass index is 34 02 kg/m²  Input and Output Summary (last 24 hours): Intake/Output Summary (Last 24 hours) at 2021 1710  Last data filed at 2021 1310  Gross per 24 hour   Intake --   Output 150 ml   Net -150 ml       Physical Exam:     Physical Exam  Vitals and nursing note reviewed  Constitutional:       General: She is awake  She is not in acute distress  Appearance: She is obese  She is ill-appearing (chronically ill appearing)  HENT:      Head: Normocephalic and atraumatic  Nose: Nose normal       Mouth/Throat:      Mouth: Mucous membranes are moist       Pharynx: Oropharynx is clear  Eyes:      General: No scleral icterus  Extraocular Movements: Extraocular movements intact  Conjunctiva/sclera: Conjunctivae normal       Pupils: Pupils are equal, round, and reactive to light  Neck:      Vascular: No carotid bruit  Cardiovascular:      Rate and Rhythm: Normal rate and regular rhythm  Pulses: Normal pulses  Heart sounds: Normal heart sounds  Pulmonary:      Effort: Pulmonary effort is normal  No respiratory distress  Breath sounds: Rales (few rales at bases) present  No wheezing or rhonchi  Abdominal:      General: Abdomen is protuberant  Bowel sounds are normal  There is no distension  Palpations: Abdomen is soft  There is no mass  Tenderness: There is no abdominal tenderness  There is no guarding or rebound  Musculoskeletal:      Cervical back: Neck supple  Right lower le+ Pitting Edema present  Left lower le+ Pitting Edema present  Skin:     General: Skin is warm  Capillary Refill: Capillary refill takes less than 2 seconds  Coloration: Skin is not jaundiced  Findings: No rash  Neurological:      General: No focal deficit present  Mental Status: She is alert and oriented to person, place, and time  Cranial Nerves: No cranial nerve deficit  Sensory: No sensory deficit  Motor: No weakness  Psychiatric:         Mood and Affect: Mood normal          Behavior: Behavior normal  Behavior is cooperative  Additional Data:     Labs:    Results from last 7 days   Lab Units 06/15/21  1923   WBC Thousand/uL 10 05   HEMOGLOBIN g/dL 13 2   HEMATOCRIT % 43 0   PLATELETS Thousands/uL 153   NEUTROS PCT % 91*   LYMPHS PCT % 1*   MONOS PCT % 7   EOS PCT % 0     Results from last 7 days   Lab Units 21  0542 06/15/21  1923   SODIUM mmol/L 139 140   POTASSIUM mmol/L 4 5 3 9   CHLORIDE mmol/L 104 103   CO2 mmol/L 27 28   ANION GAP mmol/L 8 9   BUN mg/dL 21 23   CREATININE mg/dL 0 66 1 00   CALCIUM mg/dL 8 4 8 3   ALK PHOS U/L  --  108   ALT U/L  --  26   AST U/L  --  29           * I Have Reviewed All Lab Data Listed Above  * Additional Pertinent Lab Tests Reviewed: [address] 64 Admission Reviewed    Imaging:    Imaging Reports Reviewed Today Include: CTA chest/abd/pelvis  Imaging Personally Reviewed by Myself Includes:  Same as above    PE Study with CT abdomen & pelvis with contrast  Result Date: 6/15/2021  Impression: No acute findings in the chest; no pulmonary arterial embolism or pulmonary infiltrate/consolidation  No acute inflammatory changes in the abdomen or pelvis   Workstation performed: [id number]       Recent Cultures (last 7 days):           Last 24 Hours Medication List:   Current Facility-Administered Medications   Medication Dose Route Frequency Provider Last Rate    acetaminophen  488 mg Oral Q6H PRN Carlos Farfan MD      aspirin  81 mg Oral Daily Carlos Farfan MD      atorvastatin  20 mg Oral Daily With Dino Whitney MD      calcium carbonate-vitamin D  1 tablet Oral Daily With Breakfast Carlos Farfan MD      furosemide  80 mg Intravenous BID (diuretic) Bethany Munoz DO      heparin (porcine)  5,000 Units Subcutaneous Q8H [address] Carlos Farfan MD      metoprolol tartrate  12 5 mg Oral Daily Carlos Farfan MD      ondansetron  4 mg Intravenous Q6H PRN Brian Whittington MD          Today, Patient Was Seen By: Titi Richards DO    ** Please Note: This note has been constructed using a voice recognition system   **

## 2021-06-16 NOTE — ASSESSMENT & PLAN NOTE
Reports nausea, vomiting, abdominal cramps and 3 episodes of diarrhea day prior to admission  Currently resolved  Electrolytes are within normal  No leucocytosis or fever  On physical exam she does not look dehydrated  CT abdomen is unremarkable  Lipase normal  Trop negative  Plan:  · Stool studies ordered but not collected as she has not had any additional diarrhea  · Advance diet as tolerated  · Zofran p r n  For nausea  · Will hold on IV fluids due to concerns for acute heart failure     · Monitor BMP and replete electrolytes as needed

## 2021-06-16 NOTE — ASSESSMENT & PLAN NOTE
· Blood pressure is soft since admission     · Continue home dose metoprolol and lasix with holding parameters

## 2021-06-16 NOTE — UTILIZATION REVIEW
Initial Clinical Review  WAS OBSERVATION [email or web address] CONVERTED TO INPATIENT ADMISSION [email or web address] DUE TO CONTINUED STAY REQUIRED TO CARE FOR PATIENT WITH acute hypoxic resp failure / chf requiring IV diuresis and further workup  Admission: Date/Time/Statement:   Admission Orders (From admission, onward)     Ordered        06/16/21 1157  Inpatient Admission  Once         06/15/21 2257  Place in Observation  Once                   Orders Placed This Encounter   Procedures    Inpatient Admission     Standing Status:   Standing     Number of Occurrences:   1     Order Specific Question:   Level of Care     Answer:   Med Surg [16]     Order Specific Question:   Estimated length of stay     Answer:   More than 2 Midnights     Order Specific Question:   Certification     Answer:   I certify that inpatient services are medically necessary for this patient for a duration of greater than two midnights  See H&P and MD Progress Notes for additional information about the patient's course of treatment  ED Arrival Information     Expected Arrival Acuity    - 6/15/2021 18:45 Urgent         Means of arrival Escorted by Service Admission type    Ambulance [facility] General Medicine Urgent         Arrival complaint    ABD PAIN        Chief Complaint   Patient presents with    Abdominal Pain     pt presents via EMS, [facility]  c/o of diffuse abd pain  (+) n,v,d      Leg Swelling     pt states she noticed her legs swelling up yesterday, hx of CHF        Initial Presentation: 77 yo fem w/hx breast ca, chf, htn, eryn, lymphedema, dvt to ED from home by EMS admitted under observation due to acute hypoxic resp failure  Presented with 1 day of worsening leg swelling with n/v, watery diarrhea  Found hypoxic at 87%RA requiring 2li o2  Exam revealed tachycardic, distended abd, BLE edema  Lung sounds clear  CTA chest/abd wnl  EKG sinus tach   Holding IVF due to CHF, provide antiemetics, check BLE doppler and echo, low na diet, continue home diuretic  Date: 6/16 converted to inpatient admission  No overnight events, no sob or other complaints  GI sx resolved, no stool studies collected  Exam w/basilar rales, +2 BLE pitting edema  no IVF due to concern for acute CHF  Continue bid IV diuresis w/hold parameters, compression stockings, low na diet  Keep sat>90%  Intake/Output Summary (Last 24 hours) at 6/16/2021 1710  Last data filed at 6/16/2021 1310      Gross per 24 hour   Intake --   Output 150 ml   Net -150 ml      6/16 PM update: pt cites leg swelling has increased in very short time despite diuretic compliance  Denies dietary indiscretion  C/o increased GOFF  Lungs with mild exp wheezes bilat apices  Remains on 2li o2  BLE edema noted  Plan to check echo and BLE doppler  She is not stable for discharge due to requiring further IV diuresis  Date: 6/17 Inpatient Day 2:  Has basilar rales, +2 BLE pitting edema  No further GI sx  She only got AM lasix yesterday due to hold parameters, which were adjusted and bid IV lasix is resumed today  She is feeling ok but is concerned about leg swelling  BLE doppler negative  Echo showed valvular regurg  No chest pain or sob  remains on 2li o2, low na/fluid restricted diet, compression stockings    Intake/Output Summary (Last 24 hours) at 6/17/2021 1657  Last data filed at 6/17/2021 1550      Gross per 24 hour   Intake --   Output 225 ml   Net -225 ml          ED Triage Vitals   Temperature Pulse Respirations Blood Pressure SpO2   06/15/21 1941 06/15/21 1849 06/15/21 1849 06/15/21 1849 06/15/21 1849   97 8 °F (36 6 °C) (!) 111 20 104/58 (!) 87 %      Temp Source Heart Rate Source Patient Position - Orthostatic VS BP Location FiO2 (%)   06/15/21 1941 06/15/21 1849 06/15/21 1849 06/15/21 1849 --   Oral Monitor Lying Left arm       Pain Score       06/15/21 2301       2          Wt Readings from Last 1 Encounters:   06/18/21 85 6 kg (188 lb 11 4 oz)     Additional Vital Signs:   Date/Time  Temp  Pulse  Resp  BP  MAP (mmHg)  SpO2  Calculated FIO2 (%) - Nasal Cannula  Nasal Cannula O2 Flow Rate (L/min)  O2 Device  Patient Position - Orthostatic VS   06/18/21 0900  --  --  --  --  --  --  28  2 L/min  Nasal cannula  --   06/18/21 0700  97 6 °F (36 4 °C)  83  --  126/59  85  93 %  28  2 L/min  Nasal cannula  Sitting   06/17/21 2130  98 6 °F (37 °C)  80  20  114/55  79  96 %  --  --  Nasal cannula  Sitting   06/17/21 1509  97 6 °F (36 4 °C)  88  20  102/57  76  91 %  28  2 L/min  Nasal cannula  Lying       Date/Time  Temp  Pulse  Resp  BP  MAP (mmHg)  SpO2   Nasal Cannula O2 Flow Rate (L/min)  O2 Device  Patient Position - Orthostatic VS   06/17/21 0948  --  --  --  --  --  --   2 L/min  Nasal cannula  --   06/17/21 0758  98 6 °F (37 °C)  98  20  147/69  --  91 %   2 L/min  Nasal cannula  Sitting   06/16/21 2228  98 8 °F (37 1 °C)  80  20  130/80  --  95 %   2 L/min  Nasal cannula  Lying   06/16/21 1600  --  --  --  --  --  --   2 L/min  Nasal cannula  --   06/16/21 1401  98 4 °F (36 9 °C)  85  14  118/87  --  96 %   --  None (Room air)  Sitting   06/16/21 0700  97 5 °F (36 4 °C)  87  17  125/71  --  96 %   2 5 L/min  Nasal cannula  Lying   06/16/21 0013  98 5 °F (36 9 °C)  97  18  122/71  --  93 %   3 L/min  Nasal cannula  Lying   06/15/21 2300  --  94  18  96/60  73  94 %   2 L/min  Nasal cannula  Lying   06/15/21 1942  --  --  --  --  --  --   --  Nasal cannula  --   06/15/21 1941  97 8 °F (36 6 °C)  --  --  --  --  --   --  --  --   06/15/21 1930  --  102  19  96/55  70  94 %   2 L/min  Nasal cannula  Lying   06/15/21 1859  --  --  --  --  --  95 %   2 L/min  Nasal cannula  --   SpO2: on room air, placed on 2L NC at 06/15/21 1850           Pertinent Labs/Diagnostic Test Results:   6/15 CTA chest/abd: No acute findings in the chest; no pulmonary arterial embolism or pulmonary infiltrate/consolidation    No acute inflammatory changes in the abdomen or pelvis      6/16 BLE doppler:RIGHT LOWER LIMB:  No evidence of acute or chronic deep vein thrombosis  No evidence of superficial thrombophlebitis noted  Doppler evaluation shows a normal response to augmentation maneuvers  Popliteal, posterior tibial and anterior tibial arterial Doppler waveforms are  biphasic  LEFT LOWER LIMB:  No evidence of acute or chronic deep vein thrombosis  No evidence of superficial thrombophlebitis noted  Doppler evaluation shows a normal response to augmentation maneuvers  Popliteal, posterior tibial and anterior tibial arterial Doppler waveforms are  biphasic  6/16 echo: LEFT VENTRICLE:  Size was normal   Systolic function was normal  Ejection fraction was estimated to be 60 %  There was no diagnostic regional wall motion abnormality identified  Sensitivity is limited due to technically difficult images      MITRAL VALVE:  There was trace regurgitation      TRICUSPID VALVE:  There was trace regurgitation      Results from last 7 days   Lab Units 06/15/21  1923   SARS-COV-2  Negative     Results from last 7 days   Lab Units 06/17/21  0506 06/15/21  1923   WBC Thousand/uL 5 50 10 05   HEMOGLOBIN g/dL 12 1 13 2   HEMATOCRIT % 39 7 43 0   PLATELETS Thousands/uL 133* 153   NEUTROS ABS Thousands/µL  --  9 14*         Results from last 7 days   Lab Units 06/17/21  0506 06/16/21  0542 06/15/21  1923   SODIUM mmol/L 145 139 140   POTASSIUM mmol/L 3 4* 4 5 3 9   CHLORIDE mmol/L 106 104 103   CO2 mmol/L 34* 27 28   ANION GAP mmol/L 5 8 9   BUN mg/dL 20 21 23   CREATININE mg/dL 0 71 0 66 1 00   EGFR ml/min/1 73sq m 80 83 53   CALCIUM mg/dL 8 1* 8 4 8 3   MAGNESIUM mg/dL 2 2  --   --      Results from last 7 days   Lab Units 06/15/21  1923   AST U/L 29   ALT U/L 26   ALK PHOS U/L 108   TOTAL PROTEIN g/dL 7 0   ALBUMIN g/dL 3 0*   TOTAL BILIRUBIN mg/dL 0 60         Results from last 7 days   Lab Units 06/17/21  0506 06/16/21  0542 06/15/21  1923   GLUCOSE RANDOM mg/dL 96 100 159*       Results from last 7 days   Lab Units 06/16/21  0542   CK TOTAL U/L 168   CK MB INDEX % <1 0   CK MB ng/mL 1 0     Results from last 7 days   Lab Units 06/16/21  0231 06/15/21  2332 06/15/21  1923   TROPONIN I ng/mL <0 02 <0 02 <0 02       Results from last 7 days   Lab Units 06/15/21  1923   TSH 3RD GENERATON uIU/mL 0 649       Results from last 7 days   Lab Units 06/15/21  1923   NT-PRO BNP pg/mL 346     Results from last 7 days   Lab Units 06/16/21  0231   LIPASE u/L 75         ED Treatment:   Medication Administration from 06/15/2021 1845 to 06/16/2021 0011       Date/Time Order Dose Route Action     06/15/2021 2020 iohexol (OMNIPAQUE) 350 MG/ML injection (SINGLE-DOSE) 100 mL 100 mL Intravenous Given     06/15/2021 2223 furosemide (LASIX) injection 40 mg 40 mg Intravenous Given        Past Medical History:   Diagnosis Date    Acid reflux     Breast cancer (HCC)     Armidex    [facility] [address] [facility]     DVT (deep venous thrombosis) ([facility] [address] ) 07/2016    subacute/chronic dvt on the right side in     Edema     chronic leg edema, PSBO 12/31/2015    Heart failure ([facility] [address] )     History of echocardiogram 07/01/2016    **Transthoracic Echocardiogram 7/1/16: LV normal size left ventricle  no LVH  normal global systolic LV function (EF 10%)  no gross regional wall motion abnormality  normal diastolic LV function  RV normal size  no RVH   normal RV function  Left atrium is normal in size  normal appearing atrial septum  Right atrium is normal in size  normal appearing atrial septum  mitral valve has mild thickenin    History of EKG 11/01/2017    *EKG 11/1/17: normal sinus rhythm; RBBB, left anterior fascicular block, bifascicular block; moderate voltage criteria for LVH, may be normal variant, abnormal ECGEKG 5/26/17: normal sinus rhythm, RBBB, left anterior fascicular block, bifascicular block; moderate voltage criteria for LVH, may be normal variant; abnormal ECG  EKG 3/1/17: normal sinus rhythm; RBBB, left anterior fascicular bloc    History of stress test 04/25/2014    **Adenosine Cardiolite Stress Test 4/25/14: EKG portion- 1) the stress test was inconclusive for myocardial ischemia due to baseline EKG abnormalities 2) physiologic response to adenosine infusion 3) cardiolite scan is attached  Conclusion: Very mildly abnormal Adenosine Cardiolite SPECT study  there is a small fixed mild defect in the left ventricular apex  there is no reversible ischemia  diffe    Hyperlipidemia     Hypertension     Hypertension     Leg edema     chronic     Lymphedema     Osteopenia     Peripheral vascular disease (HCC)     Scoliosis     Sleep apnea     Thrombocytopenia (HCC)     Vitamin D deficiency      Present on Admission:   Acute respiratory failure with hypoxia (HCC)   Leg swelling   Essential hypertension   (Resolved) Gastroenteritis like symptoms   DAVE (obstructive sleep apnea)   CKD (chronic kidney disease) stage 3, GFR 30-59 ml/min ([facility])      Admitting Diagnosis: Diarrhea [R19 7]  Vomiting [R11 10]  Abdominal pain [R10 9]  Hypoxia [R09 02]  Age/Sex: 78 y o  female  Admission Orders:  Scheduled Medications:  aspirin, 81 mg, Oral, Daily  atorvastatin, 20 mg, Oral, Daily With Dinner  calcium carbonate-vitamin D, 1 tablet, Oral, Daily With Breakfast  furosemide, 40 mg, Oral, QPM  furosemide, 80 mg, Oral, Daily, increased to bid on 6/17  heparin (porcine), 5,000 Units, Subcutaneous, Q8H [facility]  metoprolol tartrate, 12 5 mg, Oral, Daily  PO kdur x 2 6/17, x 1 6/18      Continuous IV Infusions:none     PRN Meds:  acetaminophen, 488 mg, Oral, Q6H PRN  ondansetron, 4 mg, Intravenous, Q6H PRN    Tele  SCD  teds  2li fluid restriction    Network Utilization Review Department  ATTENTION: Please call with any questions or concerns to [phone] and carefully listen to the prompts so that you are directed to the right person   All voicemails are confidential   Stephen Akins all requests for admission clinical reviews, approved or denied determinations and any other requests to dedicated fax number below belonging to the campus where the patient is receiving treatment   List of dedicated fax numbers for the Facilities:  [address] [place] [address] DENIALS (Administrative/Medical Necessity) [phone]   [address] (Maternity/NICU/Pediatrics) [phone]   [address] [address] [place] [address] [address] [place] [address] [phone]

## 2021-06-16 NOTE — PLAN OF CARE
Problem: PHYSICAL THERAPY ADULT  Goal: Performs mobility at highest level of function for planned discharge setting  See evaluation for individualized goals  Description: Treatment/Interventions: Functional transfer training, LE strengthening/ROM, Therapeutic exercise, Endurance training, Patient/family training, Equipment eval/education, Bed mobility, Gait training  Equipment Recommended: Monica Lewis       See flowsheet documentation for full assessment, interventions and recommendations  Note: Prognosis: Fair  Problem List: Decreased strength, Decreased endurance, Impaired balance, Decreased mobility, Obesity, Decreased coordination, Decreased safety awareness  Assessment: Cristiane Park is a 78 y o  Female who [facility] on 6/15/2021 from [facility] w/ c/o abdominal pain, leg swelling and diagnosis of acute respiratory failure w/ hypoxia  Orders for PT eval and treat received, w/ activity orders of up w/ A w/ fall risk and strict contact/hand hygiene precautions  Pt presents w/ comorbidities of HTN, lymphedema, chronic DVT of RLE and personal factors including: mobilizing w/ assistive device, inability to navigate community distances and requiring assistance w/ ADLs and IADLs  At baseline, pt mobilizes independently w/ RW, and reports 0 falls in the last 6 months  Upon evaluation, pt presents w/ the following deficits: weakness, impaired coordination, impaired balance, decreased endurance and gait deviations  Pt requires Min Ax1 for bed mobility, Mod Ax1-Min Ax1 for transfers, and Min Ax1 progressing to close supervision for gait  Pt's clinical presentation is unstable/unpredictable due to need for assist w/ all phases of mobility when usually mobilizing independently, limited ambulation endurance, ongoing medical monitoring/management, fall risk, and need for input for mobility technique/safety   Pt is at an increased risk of falls due to impaired balance, decreased safety awareness, decreased LE strength, and requirement of assistance w/ ambulation  Given the above findings, discharge recommendation is for post acute rehab  During this admission, pt would benefit from skilled acute inpatient PT in order to address the abovementioned deficits to maximize function and mobility before DC from acute care  PT Discharge Recommendation: Post acute rehabilitation services          See flowsheet documentation for full assessment

## 2021-06-16 NOTE — H&P
[facility]  H&P- Mnauel Martinez 1940, 78 y o  female MRN: [id number]  Unit/Bed#: S -01 Encounter: [id number]  Primary Care Provider: Dwight Hernández MD   Date and time admitted to hospital: 6/15/2021  6:45 PM    * Acute respiratory failure with hypoxia (HCC)  Assessment & Plan  · SpO2 dropped to 87% on admission requiring 2 L nasal cannula  · She reports exertion shortness of breath and worsening leg swelling  She does have history of lymphedema , non occlusive DVT and diastolic dysfunction  · Per chart review : Adenosine Cardiolite Stress Test 4/25/14: the stress test was inconclusive for myocardial ischemia  · Per PCP previous notes : Echo 11/28/2018: Ejection fraction 65% , Left ventricular wall thickness top normal to mildly thickened , Left ventricular diastolic dysfunction , Left atrium mildly to moderately enlarged and mild tricuspid regurgitation    Workup done:  · CTA chest negative for PE , pleural effusion , consolidations and pulmonary edema  · EKG noted for sinus tachycardia , bifascicular block and T inversion in multiple leads  No prior EKG to compare however per chart review does mention that she has history of bifascicular block  · ProBNP 346  · Troponin 0 02x1  · COVID-19 is negative  · Hemoglobin 13 2  DDx : Deconditioning , Acute heart failure , ACS , 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 , Significant valvular heart disease  Plan:  · Given IV lasix 40 mg in ED  Continue with home dose po lasix 80 mg qd in AM and 40 mg qd in evening  · Trend troponin and check EKG with each set  · Obtain Echocardiogram  · Monitor on Telemetry  Continue aspirin and statin  · PT/OT evaluation   · Refused CPAP  · Wean off NC as tolerated to keep Spo2 over 89%        Leg swelling  Assessment & Plan  · History of chronic lymphedema, patient said that her leg swelling has worsened the last 3 days  · Continue home dose furosemide 80/40    Given IV Lasix 40 mg in ED  · CT abdomen is negative for ascites or significant liver disease  · Hypoalbuminemia is contributing  Plan:  · Will obtain echocardiogram to rule out cardiac etiology  · Check Doppler ultrasound to rule out DVT ( has history of breast cancer and nonocclusive DVT)  · Check UA to rule out proteinuria  · Check CK level  · Compression stockings  · Low-salt diet  Gastroenteritis like symptoms  Assessment & Plan  · Reports nausea, vomiting, abdominal cramps and 3 episodes of diarrhea yesterday  Currently resolved  · Electrolytes are within normal  No leucocytosis or fever  · On physical exam she does not look dehydrated  · CT abdomen is unremarkable  Plan:  · Stool studies sent by ED provider  Will follow results  · Check lipase  · Advance diet as tolerated  · Zofran p r n  For nausea  · Will hold on IV fluids due to concerns for acute heart failure  · Monitor BMP and replete electrolytes as needed    CKD (chronic kidney disease) stage 3, GFR 30-59 ml/min [facility])  Assessment & Plan  Lab Results   Component Value Date    EGFR 53 06/15/2021    CREATININE 1 00 06/15/2021   · Kidney function looks worsening from prior labs in 2020  · Daily BMP while inpatient - monitor for contrast induced nephropathy  · Avoid hypotension and nephrotoxins as possible  Essential hypertension  Assessment & Plan  · Blood pressure is soft since admission  · Continue home dose metoprolol and lasix with holding parameters    DAVE (obstructive sleep apnea)  Assessment & Plan  · Refused CPAP  Previously stopped due to eye swelling from the mask  VTE Prophylaxis: Heparin  / sequential compression device   Code Status:  DNR/DNI  POLST: POLST form is not discussed and not completed at this time  Anticipated Length of Stay:  Patient will be admitted on an Observation basis with an anticipated length of stay of  less than 2 midnights  Justification for Hospital Stay:  Acute hypoxic respiratory failure  Chief Complaint:   Nausea, vomiting, diarrhea and leg swelling  History of Present Illness:    Katlyn Schneider is a 78 y o  female with past medical history of breast cancer, chronic diastolic dysfunction, hypertension, obstructive sleep apnea, hypertension, lymphedema, nonocclusive chronic DVT who presents due to complaints of nausea, vomiting, diarrhea and worsening leg swelling  States that the symptoms started yesterday  She mentioned 3 watery stools with no blood  She denies fever, chills, cough or chest pain  She did mention chronic exertional shortness of breath  She denies any urinary complaints  By the time I saw her, her GI symptoms were resolved and she reports no further abdominal pain  On admission, patient was noted to be hypoxic with SpO2 dropping to 87% on room air requiring 2 L supplemental oxygen  CTA chest/abd was unremarkable  Patient will be admitted for further management due to above  Review of Systems:    Review of Systems   Constitutional: Positive for appetite change and fatigue  Negative for fever  HENT: Negative for trouble swallowing and voice change  Eyes: Negative for pain and discharge  Respiratory: Positive for shortness of breath  Negative for cough and chest tightness  Cardiovascular: Positive for leg swelling  Negative for chest pain and palpitations  Gastrointestinal: Positive for abdominal pain, diarrhea, nausea and vomiting  Negative for blood in stool  Genitourinary: Negative  Musculoskeletal: Positive for arthralgias and joint swelling  Skin: Negative for rash and wound  Neurological: Negative for dizziness, light-headedness and headaches  Psychiatric/Behavioral: Negative  All other systems reviewed and are negative        Past Medical and Surgical History:         Past Surgical History:   Procedure Laterality Date    APPENDECTOMY      BREAST BIOPSY  12/28/2015    CHOLECYSTECTOMY  2009    COLONOSCOPY  10/30/2014    HERNIA REPAIR Right     MAMMO NEEDLE LOCALIZATION RIGHT (ALL INC) Right 1/10/2013    MASTECTOMY Right 2013    MASTECTOMY Right 02/01/2013    TONSILLECTOMY      TONSILLECTOMY      US BREAST NEEDLE LOC LEFT Left 12/28/2015       Meds/Allergies:    Prior to Admission medications    Medication Sig Start Date End Date Taking? Authorizing Provider   acetaminophen (TYLENOL) 500 mg tablet Take 500 mg by mouth every 6 (six) hours as needed for mild pain   Yes Historical Provider, MD   anastrozole (ARIMIDEX) 1 mg tablet Take 1 mg by mouth daily 2/14/18  Yes Historical Provider, MD   aspirin 81 MG tablet Take by mouth   Yes Historical Provider, MD   atorvastatin (LIPITOR) 20 mg tablet Take by mouth   Yes Historical Provider, MD   Calcium Carbonate-Vitamin D (CALCIUM 500 + D) 500-125 MG-UNIT TABS Take by mouth   Yes Historical Provider, MD   calcium carbonate-vitamin D (OSCAL-D) 500 mg-200 units per tablet Take 1 tablet by mouth daily with breakfast   Yes Historical Provider, MD   Cholecalciferol (VITAMIN D3) 2000 units TABS Take by mouth   Yes Historical Provider, MD   cyanocobalamin (VITAMIN B-12) 500 mcg tablet Take by mouth   Yes Historical Provider, MD   furosemide (LASIX) 40 mg tablet Take 40 mg by mouth 2 (two) times a day   Yes Historical Provider, MD   ketoconazole (NIZORAL) 2 % cream Apply topically daily   Yes Historical Provider, MD   metoprolol tartrate (LOPRESSOR) 25 mg tablet Take by mouth   Yes Historical Provider, MD   Multiple Vitamin (MULTIVITAMIN) tablet Take 1 tablet by mouth daily   Yes Historical Provider, MD     I have reviewed home medications with patient personally      Allergies: No Known Allergies    Social History:     Marital Status: Single   Occupation:  Retired  Patient Pre-hospital Living Situation:  Nursing facility  Patient Pre-hospital Level of Mobility:  Ambulate with a cane  Patient Pre-hospital Diet Restrictions:  None  Substance Use History:   Social History     Substance and Sexual Activity   Alcohol Use No     Social History     Tobacco Use   Smoking Status Former Smoker   Smokeless Tobacco Never Used   Tobacco Comment    hasn't smoked since highschool age     Social History     Substance and Sexual Activity   Drug Use No       Family History:    Family History   Problem Relation Age of Onset    Stomach cancer Mother     Lung cancer Brother     Alzheimer's disease Brother     Parkinsonism Brother     Hypertension Brother     Heart disease Brother     Heart disease Father     Heart disease Other        Physical Exam:     Vitals:   Blood Pressure: 122/71 (06/16/21 0013)  Pulse: 97 (06/16/21 0013)  Temperature: 98 5 °F (36 9 °C) (06/16/21 0013)  Temp Source: Oral (06/16/21 0013)  Respirations: 18 (06/16/21 0013)  Height: 5' 2" (157 5 cm) (06/15/21 1849)  Weight - Scale: 84 3 kg (185 lb 13 6 oz) (06/15/21 1849)  SpO2: 93 % (06/16/21 0013)    Physical Exam  Vitals and nursing note reviewed  Constitutional:       General: She is not in acute distress  Appearance: She is obese  She is not ill-appearing, toxic-appearing or diaphoretic  HENT:      Head: Normocephalic and atraumatic  Mouth/Throat:      Mouth: Mucous membranes are moist       Pharynx: Oropharynx is clear  Eyes:      General: No scleral icterus  Extraocular Movements: Extraocular movements intact  Conjunctiva/sclera: Conjunctivae normal       Pupils: Pupils are equal, round, and reactive to light  Neck:      Vascular: No carotid bruit  Cardiovascular:      Rate and Rhythm: Regular rhythm  Tachycardia present  Pulses: Normal pulses  Pulmonary:      Effort: Pulmonary effort is normal  No respiratory distress  Breath sounds: No wheezing, rhonchi or rales  Comments: comfortable on 2L NC  Abdominal:      General: Bowel sounds are normal  There is distension  Palpations: Abdomen is soft  There is no mass  Tenderness: There is no abdominal tenderness   There is no guarding or rebound  Musculoskeletal:      Cervical back: Neck supple  Right lower leg: Edema present  Left lower leg: Edema present  Skin:     General: Skin is warm  Capillary Refill: Capillary refill takes less than 2 seconds  Coloration: Skin is not jaundiced  Findings: No rash  Neurological:      General: No focal deficit present  Mental Status: She is alert and oriented to person, place, and time  Cranial Nerves: No cranial nerve deficit  Sensory: No sensory deficit  Motor: No weakness  Psychiatric:         Mood and Affect: Mood normal          Behavior: Behavior normal            Additional Data:     Lab Results: I have personally reviewed pertinent reports  Results from last 7 days   Lab Units 06/15/21  1923   WBC Thousand/uL 10 05   HEMOGLOBIN g/dL 13 2   HEMATOCRIT % 43 0   PLATELETS Thousands/uL 153   NEUTROS PCT % 91*   LYMPHS PCT % 1*   MONOS PCT % 7   EOS PCT % 0     Results from last 7 days   Lab Units 06/15/21  1923   POTASSIUM mmol/L 3 9   CHLORIDE mmol/L 103   CO2 mmol/L 28   BUN mg/dL 23   CREATININE mg/dL 1 00   CALCIUM mg/dL 8 3   ALK PHOS U/L 108   ALT U/L 26   AST U/L 29           Imaging: I have personally reviewed pertinent reports  PE Study with CT abdomen & pelvis with contrast    Result Date: 6/15/2021  Narrative: CT PULMONARY ANGIOGRAM OF THE CHEST AND CT ABDOMEN AND PELVIS WITH INTRAVENOUS CONTRAST INDICATION:   known chronic DVT to the RLE, hypoxia, abdominal pain with diarrhea    "78-year-old female with history of chronic occlusive DVT to the right lower extremity on daily aspirin, known bifascicular block, chronic lower extremity lymphedema, hypertension, and GERD, who presents for evaluation of nausea, vomiting, diarrhea, and increased swelling in her legs " COMPARISON:  CT chest 1/23/2019 CT abdomen pelvis 6/26/2018  TECHNIQUE:  CT examination of the chest, abdomen and pelvis was performed    Thin section CT angiographic technique was used in the chest in order to evaluate for pulmonary embolus and coronal 3D MIP postprocessing was performed on the acquisition scanner  Axial, sagittal, and coronal 2D reformatted images were created from the source data and submitted for interpretation  Radiation dose length product (DLP) for this visit:  597 mGy-cm   This examination, like all CT scans performed in the [facility], was performed utilizing techniques to minimize radiation dose exposure, including the use of iterative reconstruction and automated exposure control  IV Contrast:  100 mL of iohexol (OMNIPAQUE) Enteric Contrast:  Enteric contrast was not administered  FINDINGS: CHEST PULMONARY ARTERIAL TREE:  No pulmonary embolus is seen  LUNGS:  No infiltrate or consolidation  No endotracheal or endobronchial lesion  PLEURA:  Unremarkable  HEART/AORTA:  No pericardial effusion  No thoracic aortic aneurysm or dissection  MEDIASTINUM AND RYAN:  Small hiatal hernia  CHEST WALL AND LOWER NECK:   Unremarkable  ABDOMEN LIVER/BILIARY TREE:  One or more subcentimeter sharply circumscribed low-density hepatic lesion(s) are noted, too small to accurately characterize, but statistically most likely to represent subcentimeter hepatic cysts  No suspicious solid hepatic lesion is identified  Hepatic contours are normal   No biliary dilatation  GALLBLADDER:  Cholecystectomy  SPLEEN:  Unremarkable  PANCREAS:  Unremarkable  ADRENAL GLANDS:  Bilateral adrenal gland thickening unchanged from the prior study likely on the basis of hyperplasia  KIDNEYS/URETERS:  Simple cyst  No hydronephrosis  STOMACH AND BOWEL:  There is colonic diverticulosis without evidence of acute diverticulitis  No bowel obstruction or bowel pneumatosis  APPENDIX:  Appendectomy  ABDOMINOPELVIC CAVITY:  No ascites  No pneumoperitoneum  No lymphadenopathy  VESSELS:  Unremarkable for patient's age  PELVIS REPRODUCTIVE ORGANS:  Tortuous course of the thoracic aorta   Infrarenal abdominal aortic ectasia measuring up to 31 mm  URINARY BLADDER:  Unremarkable  ABDOMINAL WALL/INGUINAL REGIONS:  Unremarkable  OSSEOUS STRUCTURES:  No acute fracture or destructive osseous lesion  Impression: No acute findings in the chest; no pulmonary arterial embolism or pulmonary infiltrate/consolidation  No acute inflammatory changes in the abdomen or pelvis  Workstation performed: [id number]     Mammo diagnostic left w 3d & cad, US breast left limited (diagnostic)    Result Date: 5/28/2021  Narrative: DIAGNOSIS: Abnormal mammogram TECHNIQUE: Ultrasound of the left breast(s) was performed  Digital diagnostic mammography was performed  Computer Aided Detection (CAD) analyzed all applicable images  COMPARISONS: Prior breast imaging dated: 01/06/2020, 12/31/2018, 12/28/2017, 11/28/2016, 12/28/2015, 12/04/2015, 11/24/2015, 12/01/2014, 11/29/2013, 01/10/2013, 01/10/2013, 11/20/2012, and 10/24/2012 RELEVANT HISTORY: Family Breast Cancer History: No known family history of breast cancer  Family Medical History: No known relevant family medical history  Personal History: Hormone history includes other  Surgical history includes breast biopsy and mastectomy  Medical history includes breast cancer  RISK ASSESSMENT: Tyrer-zick risk assessment reporting was suppressed due to the patient's history and/or demographic factors  TISSUE DENSITY: There are scattered areas of fibroglandular density  INDICATION: Jayson Bar is a 78 y o  female presenting for abnormal mammogram  FINDINGS: There are no suspicious masses, grouped microcalcifications or areas of architectural distortion  The skin and nipple areolar complex are unremarkable  There is a focal asymmetry at the 6 o'clock position left breast   Targeted sonographic evaluation of the left breast 5 to 6 o'clock position demonstrates scarring without underlying mass    Review of prior studies demonstrates and ultrasound-guided needle localization of the left breast 6 o'clock position from 12/28/2015 from [facility]   Patient is status post right mastectomy  Impression:  No evidence of malignancy  ASSESSMENT/BI-RADS CATEGORY: Left: 2 - Benign Overall: 2 - Benign RECOMMENDATION:      - Diagnostic mammogram in 1 year for the left breast  Workstation ID: [id number]      EKG, Pathology, and Other Studies Reviewed on Admission:   · EKG:  Sinus tachycardia, bifascicular block and nonspecific T inversions    Epic / Care Everywhere Records Reviewed: Yes     ** Please Note: This note has been constructed using a voice recognition system   **

## 2021-06-17 LAB
ANION GAP SERPL CALCULATED.3IONS-SCNC: 5 MMOL/L (ref 4–13)
BUN SERPL-MCNC: 20 MG/DL (ref 5–25)
C DIFF TOX B TCDB STL QL NAA+PROBE: NEGATIVE
CALCIUM SERPL-MCNC: 8.1 MG/DL (ref 8.3–10.1)
CAMPYLOBACTER DNA SPEC NAA+PROBE: NORMAL
CHLORIDE SERPL-SCNC: 106 MMOL/L (ref 100–108)
CO2 SERPL-SCNC: 34 MMOL/L (ref 21–32)
CREAT SERPL-MCNC: 0.71 MG/DL (ref 0.6–1.3)
ERYTHROCYTE [DISTWIDTH] IN BLOOD BY AUTOMATED COUNT: 15.9 % (ref 11.6–15.1)
GFR SERPL CREATININE-BSD FRML MDRD: 80 ML/MIN/1.73SQ M
GLUCOSE SERPL-MCNC: 96 MG/DL (ref 65–140)
HCT VFR BLD AUTO: 39.7 % (ref 34.8–46.1)
HGB BLD-MCNC: 12.1 G/DL (ref 11.5–15.4)
MAGNESIUM SERPL-MCNC: 2.2 MG/DL (ref 1.6–2.6)
MCH RBC QN AUTO: 28.5 PG (ref 26.8–34.3)
MCHC RBC AUTO-ENTMCNC: 30.5 G/DL (ref 31.4–37.4)
MCV RBC AUTO: 94 FL (ref 82–98)
PLATELET # BLD AUTO: 133 THOUSANDS/UL (ref 149–390)
PMV BLD AUTO: 11 FL (ref 8.9–12.7)
POTASSIUM SERPL-SCNC: 3.4 MMOL/L (ref 3.5–5.3)
RBC # BLD AUTO: 4.24 MILLION/UL (ref 3.81–5.12)
SALMONELLA DNA SPEC QL NAA+PROBE: NORMAL
SHIGA TOXIN STX GENE SPEC NAA+PROBE: NORMAL
SHIGELLA DNA SPEC QL NAA+PROBE: NORMAL
SODIUM SERPL-SCNC: 145 MMOL/L (ref 136–145)
WBC # BLD AUTO: 5.5 THOUSAND/UL (ref 4.31–10.16)

## 2021-06-17 PROCEDURE — 85027 COMPLETE CBC AUTOMATED: CPT | Performed by: PSYCHIATRY & NEUROLOGY

## 2021-06-17 PROCEDURE — 99232 SBSQ HOSP IP/OBS MODERATE 35: CPT | Performed by: HOSPITALIST

## 2021-06-17 PROCEDURE — 80048 BASIC METABOLIC PNL TOTAL CA: CPT | Performed by: PSYCHIATRY & NEUROLOGY

## 2021-06-17 PROCEDURE — 83735 ASSAY OF MAGNESIUM: CPT | Performed by: PSYCHIATRY & NEUROLOGY

## 2021-06-17 RX ORDER — POTASSIUM CHLORIDE 20 MEQ/1
40 TABLET, EXTENDED RELEASE ORAL ONCE
Status: COMPLETED | OUTPATIENT
Start: 2021-06-17 | End: 2021-06-17

## 2021-06-17 RX ORDER — POTASSIUM CHLORIDE 20 MEQ/1
20 TABLET, EXTENDED RELEASE ORAL ONCE
Status: COMPLETED | OUTPATIENT
Start: 2021-06-17 | End: 2021-06-17

## 2021-06-17 RX ORDER — FUROSEMIDE 10 MG/ML
80 INJECTION INTRAMUSCULAR; INTRAVENOUS
Status: DISCONTINUED | OUTPATIENT
Start: 2021-06-17 | End: 2021-06-18

## 2021-06-17 RX ADMIN — Medication 12.5 MG: at 09:43

## 2021-06-17 RX ADMIN — Medication 1 TABLET: at 05:46

## 2021-06-17 RX ADMIN — POTASSIUM CHLORIDE 40 MEQ: 1500 TABLET, EXTENDED RELEASE ORAL at 09:43

## 2021-06-17 RX ADMIN — FUROSEMIDE 80 MG: 10 INJECTION, SOLUTION INTRAMUSCULAR; INTRAVENOUS at 09:42

## 2021-06-17 RX ADMIN — ATORVASTATIN CALCIUM 20 MG: 20 TABLET, FILM COATED ORAL at 15:21

## 2021-06-17 RX ADMIN — HEPARIN SODIUM 5000 UNITS: 5000 INJECTION INTRAVENOUS; SUBCUTANEOUS at 14:35

## 2021-06-17 RX ADMIN — FUROSEMIDE 80 MG: 10 INJECTION, SOLUTION INTRAMUSCULAR; INTRAVENOUS at 15:21

## 2021-06-17 RX ADMIN — HEPARIN SODIUM 5000 UNITS: 5000 INJECTION INTRAVENOUS; SUBCUTANEOUS at 05:47

## 2021-06-17 RX ADMIN — HEPARIN SODIUM 5000 UNITS: 5000 INJECTION INTRAVENOUS; SUBCUTANEOUS at 22:53

## 2021-06-17 RX ADMIN — ASPIRIN 81 MG: 81 TABLET, CHEWABLE ORAL at 09:43

## 2021-06-17 RX ADMIN — POTASSIUM CHLORIDE 20 MEQ: 1500 TABLET, EXTENDED RELEASE ORAL at 11:18

## 2021-06-17 NOTE — ASSESSMENT & PLAN NOTE
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  Suspect that respiratory failure is likely related to CHF vs pulmonary HTN (hx of untreated DAVE)  Follows with Dr Sheree Mckeon at [facility] for cardiology  · CT chest PE study negative  · NT-proBNP wnl  · Troponin negative  · Echo 6/16/21: LVEF 60%, no RWMA, trace MR, trace TR  · LE duplex - no evidence of acute or chronic DVT bilaterally    Plan:  · Diuresis with Lasix 80 mg IV BID - patient did not receive Lasix on 6/16 due to hold parameters  These were adjusted today    · Recheck BMP in AM to monitor renal function and electrolytes  · Supplemental O2 to maintain SpO2 > 90% -- attempt to wean off over the next 24 hours  · May need home O2 eval if unable to wean off of supplemental O2

## 2021-06-17 NOTE — ASSESSMENT & PLAN NOTE
History of chronic keg swelling 2/2 lymphedema and chronic occlusive DVT of right paired peroneal veins  Worsening over the last 3 days despite compliance with home Lasix regimen  Suspect that leg swelling is secondary to HF and possibly hypoalbuminemia  · Venous duplex negative for acute or chronic DVT in bilateral lower extremities    Plan:  · Compression stockings    · Low-salt diet/fluid restriction  · Lasix 80 mg IV BID - will likely transition to orals tomorrow

## 2021-06-17 NOTE — PLAN OF CARE
Problem: MOBILITY - ADULT  Goal: Maintain or return to baseline ADL function  Description: INTERVENTIONS:  -  Assess patient's ability to carry out ADLs; assess patient's baseline for ADL function and identify physical deficits which impact ability to perform ADLs (bathing, care of mouth/teeth, toileting, grooming, dressing, etc )  - Assess/evaluate cause of self-care deficits   - Assess range of motion  - Assess patient's mobility; develop plan if impaired  - Assess patient's need for assistive devices and provide as appropriate  - Encourage maximum independence but intervene and supervise when necessary  - Involve family in performance of ADLs  - Assess for home care needs following discharge   - Consider OT consult to assist with ADL evaluation and planning for discharge  - Provide patient education as appropriate  Outcome: Progressing  Goal: Maintains/Returns to pre admission functional level  Description: INTERVENTIONS:  - Perform BMAT or MOVE assessment daily    - Set and communicate daily mobility goal to care team and patient/family/caregiver     - Collaborate with rehabilitation services on mobility goals if consulted  - Out of bed for toileting  - Record patient progress and toleration of activity level   Outcome: Progressing     Problem: Potential for Falls  Goal: Patient will remain free of falls  Description: INTERVENTIONS:  - Educate patient/family on patient safety including physical limitations  - Instruct patient to call for assistance with activity   - Consult OT/PT to assist with strengthening/mobility   - Keep Call bell within reach  - Keep bed low and locked with side rails adjusted as appropriate  - Keep care items and personal belongings within reach  - Initiate and maintain comfort rounds  - Make Fall Risk Sign visible to staff  - Apply yellow socks and bracelet for high fall risk patients  - Consider moving patient to room near nurses station  Outcome: Progressing     Problem: Prexisting or High Potential for Compromised Skin Integrity  Goal: Skin integrity is maintained or improved  Description: INTERVENTIONS:  - Identify patients at risk for skin breakdown  - Assess and monitor skin integrity  - Assess and monitor nutrition and hydration status  - Monitor labs   - Assess for incontinence   - Turn and reposition patient  - Assist with mobility/ambulation  - Relieve pressure over bony prominences  - Avoid friction and shearing  - Provide appropriate hygiene as needed including keeping skin clean and dry  - Evaluate need for skin moisturizer/barrier cream  - Collaborate with interdisciplinary team   - Patient/family teaching  - Consider wound care consult   Outcome: Progressing     Problem: RESPIRATORY - ADULT  Goal: Achieves optimal ventilation and oxygenation  Description: INTERVENTIONS:  - Assess for changes in respiratory status  - Assess for changes in mentation and behavior  - Position to facilitate oxygenation and minimize respiratory effort  - Oxygen administered by appropriate delivery if ordered  - Initiate smoking cessation education as indicated  - Encourage broncho-pulmonary hygiene including cough, deep breathe, Incentive Spirometry  - Assess the need for suctioning and aspirate as needed  - Assess and instruct to report SOB or any respiratory difficulty  - Respiratory Therapy support as indicated  Outcome: Progressing

## 2021-06-17 NOTE — PROGRESS NOTES
[facility]  Progress Note - Mike Loser 1940, 78 y o  female MRN: [id number]  Unit/Bed#: S -01 Encounter: [id number]  Primary Care Provider: Nely Pineda MD   Date and time admitted to hospital: 6/15/2021  6:45 PM    * Acute respiratory failure with hypoxia [facility])  Assessment & Plan  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  Suspect that respiratory failure is likely related to CHF vs pulmonary HTN (hx of untreated DAVE)  Follows with Dr Lisa Cobb at [facility] for cardiology  · CT chest PE study negative  · NT-proBNP wnl  · Troponin negative  · Echo 6/16/21: LVEF 60%, no RWMA, trace MR, trace TR  · LE duplex - no evidence of acute or chronic DVT bilaterally    Plan:  · Diuresis with Lasix 80 mg IV BID - patient did not receive Lasix on 6/16 due to hold parameters  These were adjusted today  · Recheck BMP in AM to monitor renal function and electrolytes  · Supplemental O2 to maintain SpO2 > 90% -- attempt to wean off over the next 24 hours  · May need home O2 eval if unable to wean off of supplemental O2    Leg swelling  Assessment & Plan  History of chronic keg swelling 2/2 lymphedema and chronic occlusive DVT of right paired peroneal veins  Worsening over the last 3 days despite compliance with home Lasix regimen  Suspect that leg swelling is secondary to HF and possibly hypoalbuminemia  · Venous duplex negative for acute or chronic DVT in bilateral lower extremities    Plan:  · Compression stockings    · Low-salt diet/fluid restriction  · Lasix 80 mg IV BID - will likely transition to orals tomorrow      Gastroenteritis like symptoms-resolved as of 6/16/2021  Assessment & Plan  Reports nausea, vomiting, abdominal cramps and 3 episodes of diarrhea day prior to admission  Currently resolved  Electrolytes are within normal  No leucocytosis or fever  On physical exam she does not look dehydrated  CT abdomen is unremarkable  Lipase normal  Trop negative  Plan:  · Stool studies ordered but not collected as she has not had any additional diarrhea  · Advance diet as tolerated  · Zofran p r n  For nausea  · Will hold on IV fluids due to concerns for acute heart failure  · Monitor BMP and replete electrolytes as needed    CKD (chronic kidney disease) stage 3, GFR 30-59 ml/min [facility])  Assessment & Plan  Results from last 7 days   Lab Units 06/17/21  0506 06/16/21  0542 06/15/21  1923   BUN mg/dL 20 21 23   CREATININE mg/dL 0 71 0 66 1 00     Creatinine slightly above baseline on initial presentation and has returned to her baseline  Essential hypertension  Assessment & Plan  · BP range last 24 hours: (102-147)/(57-80) 102/57  · Continue home dose metoprolol and lasix with holding parameters    DAVE (obstructive sleep apnea)  Assessment & Plan  Untreated DAVE  Stopped CPAP as the mask was causing problems with eye swelling  Refusing CPAP during admission  VTE Pharmacologic Prophylaxis:   Pharmacologic: Heparin  Mechanical VTE Prophylaxis in Place: Yes    Discussions with Specialists or Other Care Team Provider: nursing, case management    Education and Discussions with Family / Patient: Updated patient and family regarding results and plan of care  Current Length of Stay: 1 day(s)    Current Patient Status: Inpatient     Discharge Plan / Estimated Discharge Date: Possible d/c Friday or Saturday    Code Status: Level 3 - DNAR and DNI      Subjective:   Patient was seen and examined at bedside  No acute events overnight  Patient did NOT receive Lasix yesterday because of hold parameters   After looking into this, Lasix was to be held for SBP < 110 and it was 105  Hold parameters have since been changed  Today, Collette Mitchell is feeling well but is concerned about her leg swelling  No recent fevers or chills  Denies chest pain and shortness of breath  Still wearing supplemental O2 at 2 L/min  No abdominal pain, n/v/d  No urinary symptoms  Objective:     Vitals:   Temp (24hrs), Av 3 °F (36 8 °C), Min:97 6 °F (36 4 °C), Max:98 8 °F (37 1 °C)    Temp:  [97 6 °F (36 4 °C)-98 8 °F (37 1 °C)] 97 6 °F (36 4 °C)  HR:  [80-98] 88  Resp:  [20] 20  BP: (102-147)/(57-80) 102/57  SpO2:  [91 %-95 %] 91 %  Body mass index is 34 15 kg/m²  Input and Output Summary (last 24 hours): Intake/Output Summary (Last 24 hours) at 2021 1657  Last data filed at 2021 1550  Gross per 24 hour   Intake --   Output 225 ml   Net -225 ml       Physical Exam:     Physical Exam  Vitals and nursing note reviewed  Constitutional:       General: She is awake  She is not in acute distress  Appearance: She is obese  She is ill-appearing (chronically ill appearing)  HENT:      Head: Normocephalic and atraumatic  Nose: Nose normal       Mouth/Throat:      Mouth: Mucous membranes are moist       Pharynx: Oropharynx is clear  Eyes:      General: No scleral icterus  Extraocular Movements: Extraocular movements intact  Conjunctiva/sclera: Conjunctivae normal       Pupils: Pupils are equal, round, and reactive to light  Neck:      Vascular: No carotid bruit  Cardiovascular:      Rate and Rhythm: Normal rate and regular rhythm  Pulses: Normal pulses  Heart sounds: Normal heart sounds  Pulmonary:      Effort: Pulmonary effort is normal  No respiratory distress  Breath sounds: Rales (few rales at bases) present  No wheezing or rhonchi  Abdominal:      General: Abdomen is protuberant  Bowel sounds are normal  There is no distension  Palpations: Abdomen is soft  There is no mass  Tenderness:  There is no abdominal tenderness  There is no guarding or rebound  Musculoskeletal:      Cervical back: Neck supple  Right lower le+ Pitting Edema present  Left lower le+ Pitting Edema present  Comments: Kyphosis   Skin:     General: Skin is warm  Capillary Refill: Capillary refill takes less than 2 seconds  Coloration: Skin is not jaundiced  Findings: No rash  Neurological:      General: No focal deficit present  Mental Status: She is alert and oriented to person, place, and time  Cranial Nerves: No cranial nerve deficit  Sensory: No sensory deficit  Motor: No weakness  Psychiatric:         Mood and Affect: Mood normal          Behavior: Behavior normal  Behavior is cooperative  Additional Data:     Labs:    Results from last 7 days   Lab Units 21  0506 06/15/21  1923   WBC Thousand/uL 5 50 10 05   HEMOGLOBIN g/dL 12 1 13 2   HEMATOCRIT % 39 7 43 0   PLATELETS Thousands/uL 133* 153   NEUTROS PCT %  --  91*   LYMPHS PCT %  --  1*   MONOS PCT %  --  7   EOS PCT %  --  0     Results from last 7 days   Lab Units 21  0506 06/15/21  1923   SODIUM mmol/L 145 140   POTASSIUM mmol/L 3 4* 3 9   CHLORIDE mmol/L 106 103   CO2 mmol/L 34* 28   ANION GAP mmol/L 5 9   BUN mg/dL 20 23   CREATININE mg/dL 0 71 1 00   CALCIUM mg/dL 8 1* 8 3   ALK PHOS U/L  --  108   ALT U/L  --  26   AST U/L  --  29           * I Have Reviewed All Lab Data Listed Above  * Additional Pertinent Lab Tests Reviewed:  All Labs Within Last 24 Hours Reviewed    Imaging:    Imaging Reports Reviewed Today Include: venous duplex of LE  Imaging Personally Reviewed by Myself Includes:  n/a    Recent Cultures (last 7 days):     Results from last 7 days   Lab Units 21  1844   C DIFF TOXIN B BY PCR  Negative       Last 24 Hours Medication List:   Current Facility-Administered Medications   Medication Dose Route Frequency Provider Last Rate    acetaminophen  488 mg Oral Q6H PRN Benson Rosario MD  aspirin  81 mg Oral Daily Carlos Farfan MD      atorvastatin  20 mg Oral Daily With Leoncio Adam MD      calcium carbonate-vitamin D  1 tablet Oral Daily With Breakfast Carlos Farfan MD      furosemide  80 mg Intravenous BID (diuretic) Bethany Munoz DO      heparin (porcine)  5,000 Units Subcutaneous Q8H [address] Carlos Farfan MD      metoprolol tartrate  12 5 mg Oral Daily Carlos Farfan MD      ondansetron  4 mg Intravenous Q6H PRN Gurpreet Allison MD          Today, Patient Was Seen By: Clinton Gutierrez DO    ** Please Note: This note has been constructed using a voice recognition system   **

## 2021-06-17 NOTE — ASSESSMENT & PLAN NOTE
Results from last 7 days   Lab Units 06/17/21  0506 06/16/21  0542 06/15/21  1923   BUN mg/dL 20 21 23   CREATININE mg/dL 0 71 0 66 1 00     Creatinine slightly above baseline on initial presentation and has returned to her baseline

## 2021-06-17 NOTE — ASSESSMENT & PLAN NOTE
· BP range last 24 hours: (102-147)/(57-80) 102/57  · Continue home dose metoprolol and lasix with holding parameters

## 2021-06-18 LAB
ANION GAP SERPL CALCULATED.3IONS-SCNC: 5 MMOL/L (ref 4–13)
BUN SERPL-MCNC: 18 MG/DL (ref 5–25)
CALCIUM SERPL-MCNC: 8.4 MG/DL (ref 8.3–10.1)
CHLORIDE SERPL-SCNC: 104 MMOL/L (ref 100–108)
CO2 SERPL-SCNC: 36 MMOL/L (ref 21–32)
CREAT SERPL-MCNC: 0.73 MG/DL (ref 0.6–1.3)
GFR SERPL CREATININE-BSD FRML MDRD: 77 ML/MIN/1.73SQ M
GLUCOSE SERPL-MCNC: 109 MG/DL (ref 65–140)
POTASSIUM SERPL-SCNC: 3.7 MMOL/L (ref 3.5–5.3)
SODIUM SERPL-SCNC: 145 MMOL/L (ref 136–145)

## 2021-06-18 PROCEDURE — 97116 GAIT TRAINING THERAPY: CPT

## 2021-06-18 PROCEDURE — 99232 SBSQ HOSP IP/OBS MODERATE 35: CPT | Performed by: HOSPITALIST

## 2021-06-18 PROCEDURE — 80048 BASIC METABOLIC PNL TOTAL CA: CPT | Performed by: HOSPITALIST

## 2021-06-18 RX ORDER — POTASSIUM CHLORIDE 20 MEQ/1
40 TABLET, EXTENDED RELEASE ORAL ONCE
Status: COMPLETED | OUTPATIENT
Start: 2021-06-18 | End: 2021-06-18

## 2021-06-18 RX ADMIN — ASPIRIN 81 MG: 81 TABLET, CHEWABLE ORAL at 08:59

## 2021-06-18 RX ADMIN — FUROSEMIDE 80 MG: 10 INJECTION, SOLUTION INTRAMUSCULAR; INTRAVENOUS at 08:59

## 2021-06-18 RX ADMIN — Medication 12.5 MG: at 08:59

## 2021-06-18 RX ADMIN — FUROSEMIDE 80 MG: 10 INJECTION, SOLUTION INTRAMUSCULAR; INTRAVENOUS at 16:24

## 2021-06-18 RX ADMIN — Medication 1 TABLET: at 08:59

## 2021-06-18 RX ADMIN — HEPARIN SODIUM 5000 UNITS: 5000 INJECTION INTRAVENOUS; SUBCUTANEOUS at 13:49

## 2021-06-18 RX ADMIN — ATORVASTATIN CALCIUM 20 MG: 20 TABLET, FILM COATED ORAL at 16:24

## 2021-06-18 RX ADMIN — POTASSIUM CHLORIDE 40 MEQ: 1500 TABLET, EXTENDED RELEASE ORAL at 09:02

## 2021-06-18 RX ADMIN — HEPARIN SODIUM 5000 UNITS: 5000 INJECTION INTRAVENOUS; SUBCUTANEOUS at 05:49

## 2021-06-18 RX ADMIN — HEPARIN SODIUM 5000 UNITS: 5000 INJECTION INTRAVENOUS; SUBCUTANEOUS at 22:26

## 2021-06-18 NOTE — PLAN OF CARE
Problem: MOBILITY - ADULT  Goal: Maintain or return to baseline ADL function  Description: INTERVENTIONS:  -  Assess patient's ability to carry out ADLs; assess patient's baseline for ADL function and identify physical deficits which impact ability to perform ADLs (bathing, care of mouth/teeth, toileting, grooming, dressing, etc )  - Assess/evaluate cause of self-care deficits   - Assess range of motion  - Assess patient's mobility; develop plan if impaired  - Assess patient's need for assistive devices and provide as appropriate  - Encourage maximum independence but intervene and supervise when necessary  - Involve family in performance of ADLs  - Assess for home care needs following discharge   - Consider OT consult to assist with ADL evaluation and planning for discharge  - Provide patient education as appropriate  Outcome: Progressing     Problem: Potential for Falls  Goal: Patient will remain free of falls  Description: INTERVENTIONS:  - Educate patient/family on patient safety including physical limitations  - Instruct patient to call for assistance with activity   - Consult OT/PT to assist with strengthening/mobility   - Keep Call bell within reach  - Keep bed low and locked with side rails adjusted as appropriate  - Keep care items and personal belongings within reach  - Initiate and maintain comfort rounds  - Make Fall Risk Sign visible to staff  - Offer Toileting every 2 Hours, in advance of need  - Initiate/Maintain bed alarm  - Obtain necessary fall risk management equipment:   - Apply yellow socks and bracelet for high fall risk patients  - Consider moving patient to room near nurses station  Outcome: Progressing     Problem: Prexisting or High Potential for Compromised Skin Integrity  Goal: Skin integrity is maintained or improved  Description: INTERVENTIONS:  - Identify patients at risk for skin breakdown  - Assess and monitor skin integrity  - Assess and monitor nutrition and hydration status  - Monitor labs   - Assess for incontinence   - Turn and reposition patient  - Assist with mobility/ambulation  - Relieve pressure over bony prominences  - Avoid friction and shearing  - Provide appropriate hygiene as needed including keeping skin clean and dry  - Evaluate need for skin moisturizer/barrier cream  - Collaborate with interdisciplinary team   - Patient/family teaching  - Consider wound care consult   Outcome: Progressing     Problem: RESPIRATORY - ADULT  Goal: Achieves optimal ventilation and oxygenation  Description: INTERVENTIONS:  - Assess for changes in respiratory status  - Assess for changes in mentation and behavior  - Position to facilitate oxygenation and minimize respiratory effort  - Oxygen administered by appropriate delivery if ordered  - Initiate smoking cessation education as indicated  - Encourage broncho-pulmonary hygiene including cough, deep breathe, Incentive Spirometry  - Assess the need for suctioning and aspirate as needed  - Assess and instruct to report SOB or any respiratory difficulty  - Respiratory Therapy support as indicated  Outcome: Progressing

## 2021-06-18 NOTE — PROGRESS NOTES
[facility]  Progress Note - Mukund Brock 1940, 78 y o  female MRN: [id number]  Unit/Bed#: S -01 Encounter: [id number]  Primary Care Provider: Courtney Shine MD   Date and time admitted to hospital: 6/15/2021  6:45 PM    * Acute respiratory failure with hypoxia [facility])  Assessment & Plan  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  Suspect that respiratory failure is likely related to CHF vs pulmonary HTN (hx of untreated DAVE)  Follows with Dr Tony Humphrey at [facility] for cardiology  · CT chest PE study negative  · NT-proBNP wnl  · Troponin negative  · Echo 6/16/21: LVEF 60%, no RWMA, trace MR, trace TR  · LE duplex - no evidence of acute or chronic DVT bilaterally    Plan:  · Diuresis with Lasix 80 mg IV BID, likely can transition to p o  Tomorrow  · Recheck BMP in AM to monitor renal function and electrolytes  · Supplemental O2 to maintain SpO2 > 90% -- attempt to wean off over the next 24 hours      Leg swelling  Assessment & Plan  History of chronic keg swelling 2/2 lymphedema and chronic occlusive DVT of right paired peroneal veins  Worsening over the last 3 days despite compliance with home Lasix regimen  Suspect that leg swelling is secondary to HF and possibly hypoalbuminemia  · Venous duplex negative for acute or chronic DVT in bilateral lower extremities    Plan:  · Compression stockings    · Low-salt diet/fluid restriction  · Lasix 80 mg IV BID - will likely transition to orals tomorrow      CKD (chronic kidney disease) stage 3, GFR 30-59 ml/min ([facility])  Assessment & Plan  Results from last 7 days   Lab Units 06/18/21  2735 21  0506 21  0542   BUN mg/dL 18 20 21   CREATININE mg/dL 0 73 0 71 0 66     Creatinine slightly above baseline on initial presentation and has returned to her baseline  Essential hypertension  Assessment & Plan  · BP range last 24 hours: (102-147)/(57-80) 102/57  · Continue home dose metoprolol and lasix with holding parameters    DAVE (obstructive sleep apnea)  Assessment & Plan  Untreated DAVE  Stopped CPAP as the mask was causing problems with eye swelling  Refusing CPAP during admission  VTE Pharmacologic Prophylaxis:   Pharmacologic: Heparin  Mechanical VTE Prophylaxis in Place: Yes    Discussions with Specialists or Other Care Team Provider: nursing,case management    Education and Discussions with Family / Patient: Will call family    Current Length of Stay: 2 day(s)    Current Patient Status: Inpatient     Discharge Plan / Estimated Discharge Date:  Possible DC tomorrow    Code Status: Level 3 - DNAR and DNI      Subjective:   Patient notes some pain in her bilateral legs but otherwise has no subjective complaints  She does not currently have any chest pain or shortness of breath she was wearing 2 L of supplemental oxygen  Objective:     Vitals:   Temp (24hrs), Av 1 °F (36 7 °C), Min:97 6 °F (36 4 °C), Max:98 6 °F (37 °C)    Temp:  [97 6 °F (36 4 °C)-98 6 °F (37 °C)] 98 °F (36 7 °C)  HR:  [80-85] 85  Resp:  [20] 20  BP: (114-126)/(55-72) 118/72  SpO2:  [90 %-96 %] 90 %  Body mass index is 34 52 kg/m²  Input and Output Summary (last 24 hours): Intake/Output Summary (Last 24 hours) at 2021 1521  Last data filed at 2021 1320  Gross per 24 hour   Intake 0 ml   Output 2175 ml   Net -2175 ml       Physical Exam:     Physical Exam  Constitutional:       Appearance: Normal appearance  Cardiovascular:      Rate and Rhythm: Regular rhythm  Pulmonary:      Breath sounds: Rales present     Abdominal:      General: Bowel sounds are normal       Palpations: Abdomen is soft       Tenderness: There is no abdominal tenderness  Musculoskeletal:      Right lower leg: Edema (trace) present  Left lower leg: Edema (trace) present  Skin:     General: Skin is warm and dry  Neurological:      General: No focal deficit present  Mental Status: She is alert and oriented to person, place, and time  Psychiatric:         Mood and Affect: Mood normal          Behavior: Behavior normal            Additional Data:     Labs:    Results from last 7 days   Lab Units 06/17/21  0506 06/15/21  1923   WBC Thousand/uL 5 50 10 05   HEMOGLOBIN g/dL 12 1 13 2   HEMATOCRIT % 39 7 43 0   PLATELETS Thousands/uL 133* 153   NEUTROS PCT %  --  91*   LYMPHS PCT %  --  1*   MONOS PCT %  --  7   EOS PCT %  --  0     Results from last 7 days   Lab Units 06/18/21  1209 06/15/21  1923   POTASSIUM mmol/L 3 7 3 9   CHLORIDE mmol/L 104 103   CO2 mmol/L 36* 28   BUN mg/dL 18 23   CREATININE mg/dL 0 73 1 00   CALCIUM mg/dL 8 4 8 3   ALK PHOS U/L  --  108   ALT U/L  --  26   AST U/L  --  29           * I Have Reviewed All Lab Data Listed Above  * Additional Pertinent Lab Tests Reviewed:  All Labs Within Last 24 Hours Reviewed    Imaging:    Imaging Reports Reviewed Today Include: N/A  Imaging Personally Reviewed by Myself Includes:  N/A    Recent Cultures (last 7 days):     Results from last 7 days   Lab Units 06/16/21  1844   C DIFF TOXIN B BY PCR  Negative       Last 24 Hours Medication List:   Current Facility-Administered Medications   Medication Dose Route Frequency Provider Last Rate    acetaminophen  488 mg Oral Q6H PRN Carlos Farfan MD      aspirin  81 mg Oral Daily Carlos Farfan MD      atorvastatin  20 mg Oral Daily With Adeline Reyes MD      calcium carbonate-vitamin D  1 tablet Oral Daily With Breakfast Carlos Farfan MD      furosemide  80 mg Intravenous BID (diuretic) Bethany Munoz DO      heparin (porcine)  5,000 Units Subcutaneous Q8H [address] Carlos Farfan MD      metoprolol tartrate 12 5 mg Oral Daily Carlos Farfan MD      ondansetron  4 mg Intravenous Q6H PRN Leticia Hannon MD          Today, Patient Was Seen By: Nikki Duncan DO    ** Please Note: This note has been constructed using a voice recognition system   **

## 2021-06-18 NOTE — PHYSICAL THERAPY NOTE
PHYSICAL THERAPY NOTE    Patient Name: Luca Zuniga  MPSXP'Y Date: 21 1008   PT Last Visit   PT Visit Date 21   Note Type   Note Type Treatment   Pain Assessment   Pain Assessment Tool Pain Assessment not indicated - pt denies pain   Pain Score No Pain   Restrictions/Precautions   Weight Bearing Precautions Per Order No   Other Precautions Limb alert;Visual impairment; Fall Risk;Telemetry;O2;Bed Alarm; Chair Alarm  (2L O2 via NC)   General   Family/Caregiver Present No   Subjective   Subjective Patient seated in bathroom with call bell engaged and is agreeable to participate in therapy session  Pt identifers obtained from name &   Bed Mobility   Supine to Sit Unable to assess   Sit to Supine Unable to assess   Additional Comments Pt seated OOB in recliner post session with chair alarm engaged, call bell and belongings in reach  Transfers   Sit to Stand 4  Minimal assistance   Additional items Assist x 1; Armrests; Increased time required;Verbal cues   Stand to Sit 4  Minimal assistance   Additional items Assist x 1; Armrests; Increased time required;Verbal cues   Additional Comments Standing tolerance at sink x 2 minutes with CGA and alternating unilateral to bilateral UE support  Ambulation/Elevation   Gait pattern Decreased foot clearance; Short stride; Excessively slow; Forward Flexion   Gait Assistance 4  Minimal assist   Additional items Assist x 1;Verbal cues   Assistive Device Rolling walker   Distance 40' x1, 60' x1   Balance   Static Sitting Fair   Dynamic Sitting Poor +   Static Standing Fair -   Dynamic Standing Poor +   Ambulatory Poor +   Endurance Deficit   Endurance Deficit Yes   Endurance Deficit Description limited activity tolerance   Activity Tolerance   Activity Tolerance Patient limited by fatigue   Nurse Made Aware Spoke to Traci Cerrato RN    Assessment   Prognosis Fair   Problem List Decreased strength;Decreased endurance; Impaired balance;Decreased mobility;Obesity; Decreased coordination;Decreased safety awareness   Assessment Patient agreeable to participate in therapy session  Patient demonstrates conistent min ax1 for sit<>stand transfers with instruction for hand placement and body positioning  Standing tolerance at sink x 2 minutes with CGA and alternating unilateral to bilateral UE support  Patient able to ambulate increased gait distance however remains limited by fatigue  Requires min ax1 for steadying balance and roller walker management  Patient requires seated rest breaks with noted SOB, however patient denies  Quick recovery noted  Continue to focus on OOB mobility with progression of transfers and ambulation as appropriate  Goals   Patient Goals none stated   STG Expiration Date 06/26/21   PT Treatment Day 1   Plan   Treatment/Interventions Functional transfer training;LE strengthening/ROM; Therapeutic exercise; Endurance training;Patient/family training;Equipment eval/education; Bed mobility;Gait training;Spoke to nursing   PT Frequency Other (Comment)  (3-5x/week)   Recommendation   PT Discharge Recommendation Post acute rehabilitation services   Equipment Recommended [facility] Recommended Wheeled walker   Change/add to [employer]? No   AM-PAC Basic Mobility Inpatient   Turning in Bed Without Bedrails 3   Lying on Back to Sitting on Edge of Flat Bed 3   Moving Bed to Chair 3   Standing Up From Chair 3   Walk in Room 3   Climb 3-5 Stairs 2   Basic Mobility Inpatient Raw Score 17   Basic Mobility Standardized Score 39 67     The patient's AM-PAC Basic Mobility Inpatient Short Form Raw Score is 17, Standardized Score is 39 67  A standardized score less than 42 9 suggests the patient may benefit from discharge to post-acute rehabilitation services  Please also refer to the recommendation of the Physical Therapist for safe discharge planning        Vladimir Dukes PTA

## 2021-06-18 NOTE — CASE MANAGEMENT
Patient is a resident at [address] [place] [address] 3  Patient wishes to return to this residence in the Assisted Living building  CM left VM with both director Mino[place] and Nursing in [facility] 3 at [facility] requesting callback  CM received callback from both [facility] and [address] [place] [address] at [facility] at [phone] ext  [phone] requesting PT/OT notes which CM faxed to [address] [place] [address] at [phone] per her request   MONE then received call from Ligia Betancourt in Admissions at [facility] stating that the management in the Assisted Living prefers that patient go to SNF for rehab prior to returning to her Assisted Living room  CM discussed this with the patient  Patient expressed her dissatisfaction about this and is not agreeable to go to SNF at this time and wants to go back to her Assisted Living Room  MONE called Ligia Betancourt back in admissions at [facility] to notify that patient does not want to go to SNF; Ligia Betancourt reports that she is requesting that Administration in the Assisted Living building call CM to discuss  Awaiting callback from Administration at [facility] [address] 3 to determine whether patient will be allowed to return to her [facility] room which is her desire

## 2021-06-18 NOTE — ASSESSMENT & PLAN NOTE
Results from last 7 days   Lab Units 06/18/21  1209 06/17/21  0506 06/16/21  0542   BUN mg/dL 18 20 21   CREATININE mg/dL 0 73 0 71 0 66     Creatinine slightly above baseline on initial presentation and has returned to her baseline

## 2021-06-18 NOTE — PLAN OF CARE
Problem: PHYSICAL THERAPY ADULT  Goal: Performs mobility at highest level of function for planned discharge setting  See evaluation for individualized goals  Description: Treatment/Interventions: Functional transfer training, LE strengthening/ROM, Therapeutic exercise, Endurance training, Patient/family training, Equipment eval/education, Bed mobility, Gait training, Spoke to nursing  Equipment Recommended: Vern Mcdonnell       See flowsheet documentation for full assessment, interventions and recommendations  Outcome: Progressing  Note: Prognosis: Fair  Problem List: Decreased strength, Decreased endurance, Impaired balance, Decreased mobility, Obesity, Decreased coordination, Decreased safety awareness  Assessment: Patient agreeable to participate in therapy session  Patient demonstrates conistent min ax1 for sit<>stand transfers with instruction for hand placement and body positioning  Standing tolerance at sink x 2 minutes with CGA and alternating unilateral to bilateral UE support  Patient able to ambulate increased gait distance however remains limited by fatigue  Requires min ax1 for steadying balance and roller walker management  Patient requires seated rest breaks with noted SOB, however patient denies  Quick recovery noted  Continue to focus on OOB mobility with progression of transfers and ambulation as appropriate  PT Discharge Recommendation: Post acute rehabilitation services          See flowsheet documentation for full assessment

## 2021-06-18 NOTE — ASSESSMENT & PLAN NOTE
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

## 2021-06-18 NOTE — CASE MANAGEMENT
LOS 1  Pt is currently not a bundle or a 30 day readmission  Met with pt who provided information for initial assessment  Pt resides at [facility]  Prior to admission, pt was ambulating with a rolling walker and staff assists with ADLs (bathing & dressing)  Discussed STR recommendations with pt  Pt is not sure if she wants to go to STR at [facility] and may consider returning back to [facility] as she feels she does not need rehab  Informed pt CM will follow-up as pt needed time to decide what she prefers  CM to follow-up

## 2021-06-19 LAB
ANION GAP SERPL CALCULATED.3IONS-SCNC: 6 MMOL/L (ref 4–13)
BUN SERPL-MCNC: 24 MG/DL (ref 5–25)
CALCIUM SERPL-MCNC: 9.1 MG/DL (ref 8.3–10.1)
CHLORIDE SERPL-SCNC: 105 MMOL/L (ref 100–108)
CO2 SERPL-SCNC: 31 MMOL/L (ref 21–32)
CREAT SERPL-MCNC: 0.72 MG/DL (ref 0.6–1.3)
ERYTHROCYTE [DISTWIDTH] IN BLOOD BY AUTOMATED COUNT: 15.9 % (ref 11.6–15.1)
GFR SERPL CREATININE-BSD FRML MDRD: 78 ML/MIN/1.73SQ M
GLUCOSE SERPL-MCNC: 90 MG/DL (ref 65–140)
HCT VFR BLD AUTO: 47.5 % (ref 34.8–46.1)
HGB BLD-MCNC: 14.1 G/DL (ref 11.5–15.4)
MCH RBC QN AUTO: 28.6 PG (ref 26.8–34.3)
MCHC RBC AUTO-ENTMCNC: 29.7 G/DL (ref 31.4–37.4)
MCV RBC AUTO: 96 FL (ref 82–98)
PLATELET # BLD AUTO: 122 THOUSANDS/UL (ref 149–390)
PMV BLD AUTO: 11.7 FL (ref 8.9–12.7)
POTASSIUM SERPL-SCNC: 3.8 MMOL/L (ref 3.5–5.3)
RBC # BLD AUTO: 4.93 MILLION/UL (ref 3.81–5.12)
SODIUM SERPL-SCNC: 142 MMOL/L (ref 136–145)
WBC # BLD AUTO: 5.28 THOUSAND/UL (ref 4.31–10.16)

## 2021-06-19 PROCEDURE — 80048 BASIC METABOLIC PNL TOTAL CA: CPT | Performed by: INTERNAL MEDICINE

## 2021-06-19 PROCEDURE — 85027 COMPLETE CBC AUTOMATED: CPT | Performed by: INTERNAL MEDICINE

## 2021-06-19 PROCEDURE — 99232 SBSQ HOSP IP/OBS MODERATE 35: CPT | Performed by: HOSPITALIST

## 2021-06-19 RX ADMIN — ASPIRIN 81 MG: 81 TABLET, CHEWABLE ORAL at 08:49

## 2021-06-19 RX ADMIN — Medication 1 TABLET: at 08:49

## 2021-06-19 RX ADMIN — FUROSEMIDE 60 MG: 40 TABLET ORAL at 15:19

## 2021-06-19 RX ADMIN — HEPARIN SODIUM 5000 UNITS: 5000 INJECTION INTRAVENOUS; SUBCUTANEOUS at 22:00

## 2021-06-19 RX ADMIN — Medication 12.5 MG: at 08:49

## 2021-06-19 RX ADMIN — ATORVASTATIN CALCIUM 20 MG: 20 TABLET, FILM COATED ORAL at 15:23

## 2021-06-19 RX ADMIN — FUROSEMIDE 60 MG: 40 TABLET ORAL at 09:33

## 2021-06-19 RX ADMIN — HEPARIN SODIUM 5000 UNITS: 5000 INJECTION INTRAVENOUS; SUBCUTANEOUS at 15:19

## 2021-06-19 RX ADMIN — HEPARIN SODIUM 5000 UNITS: 5000 INJECTION INTRAVENOUS; SUBCUTANEOUS at 05:27

## 2021-06-19 NOTE — PROGRESS NOTES
[facility]  Progress Note - Almita Devi 1940, 78 y o  female MRN: [id number]  Unit/Bed#: S -01 Encounter: [id number]  Primary Care Provider: Garett Little MD   Date and time admitted to hospital: 6/15/2021  6:45 PM    Leg swelling  Assessment & Plan  History of chronic keg swelling 2/2 lymphedema and chronic occlusive DVT of right paired peroneal veins  Worsening over the last 3 days despite compliance with home Lasix regimen  Suspect that leg swelling is secondary to HF and possibly hypoalbuminemia  · Venous duplex negative for acute or chronic DVT in bilateral lower extremities    Plan:  · Compression stockings  · Low-salt diet/fluid restriction  · Lasix 80 mg IV BID -transition to Lasix 60 mg p o  B i d   · From our standpoint stable for discharge, however patient would like to go back to her old facility because she has an electric bed there,  working with facility      CKD (chronic kidney disease) stage 3, GFR 30-59 ml/min ([facility] [address] )  Assessment & Plan  Results from last 7 days   Lab Units 06/19/21  0547 06/18/21  1209 06/17/21  0506   BUN mg/dL 24 18 20   CREATININE mg/dL 0 72 0 73 0 71     Creatinine slightly above baseline on initial presentation and has returned to her baseline  Essential hypertension  Assessment & Plan  · BP range last 24 hours: (102-147)/(57-80) 102/57  · Continue home dose metoprolol and lasix with holding parameters    DAVE (obstructive sleep apnea)  Assessment & Plan  Untreated DAVE  Stopped CPAP as the mask was causing problems with eye swelling  Refusing CPAP during admission  * Acute respiratory failure with hypoxia [facility])  Assessment & Plan  Patient noted to be hypoxic at 87% on room air on initial vitals obtained in the ED  Does not use supplemental O2 at home  No history of lung disease  Patient also noticing worsening leg edema over the past several days   On review of prior records, patient has history of chronic lower extremity edema secondary to lymphedema and 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  Suspect that respiratory failure is likely related to CHF vs pulmonary HTN (hx of untreated DAVE)  Follows with Dr Bertin Pathak at [facility] for cardiology  · CT chest PE study negative  · NT-proBNP wnl  · Troponin negative  · Echo 21: LVEF 60%, no RWMA, trace MR, trace TR  · LE duplex - no evidence of acute or chronic DVT bilaterally    Plan:  · Lasix IV transition to 60 mg p o  B i d    · Patient currently on room air  · Supplemental O2 to maintain SpO2 > 90% -- attempt to wean off over the next 24 hours        VTE Pharmacologic Prophylaxis:   Pharmacologic: Heparin  Mechanical VTE Prophylaxis in Place: Yes    Discussions with Specialists or Other Care Team Provider: nursing,case management    Education and Discussions with Family / Patient:  Offered to call family, patient refused    Current Length of Stay: 3 day(s)    Current Patient Status: Inpatient     Discharge Plan / Estimated Discharge Date:  Possible DC tomorrow    Code Status: Level 3 - DNAR and DNI      Subjective:   Patient notes some pain in her bilateral legs but otherwise has no subjective complaints  She does not currently have any chest pain or shortness of breath she was wearing 2 L of supplemental oxygen  Objective:     Vitals:   Temp (24hrs), Av 1 °F (36 7 °C), Min:98 °F (36 7 °C), Max:98 1 °F (36 7 °C)    Temp:  [98 °F (36 7 °C)-98 1 °F (36 7 °C)] 98 1 °F (36 7 °C)  HR:  [] 100  Resp:  [20] 20  BP: (118-134)/(66-72) 134/66  SpO2:  [90 %-94 %] 94 %  Body mass index is 35 12 kg/m²  Input and Output Summary (last 24 hours): Intake/Output Summary (Last 24 hours) at 2021 1328  Last data filed at 2021 1216  Gross per 24 hour   Intake 980 ml   Output 1675 ml   Net -695 ml       Physical Exam:     Physical Exam  Constitutional:       Appearance: Normal appearance  HENT:      Head: Normocephalic and atraumatic  Mouth/Throat:      Mouth: Mucous membranes are moist    Eyes:      Extraocular Movements: Extraocular movements intact  Conjunctiva/sclera: Conjunctivae normal       Pupils: Pupils are equal, round, and reactive to light  Cardiovascular:      Rate and Rhythm: Normal rate and regular rhythm  Pulses: Normal pulses  Heart sounds: Normal heart sounds  Pulmonary:      Breath sounds: Rales present  Comments: Left lower lobe mild crackle, the rest are clear  Abdominal:      General: Bowel sounds are normal       Palpations: Abdomen is soft  Tenderness: There is no abdominal tenderness  Musculoskeletal:      Cervical back: Normal range of motion  Right lower leg: Edema (trace) present  Left lower leg: Edema (trace) present  Skin:     General: Skin is warm and dry  Neurological:      General: No focal deficit present  Mental Status: She is alert and oriented to person, place, and time  Psychiatric:         Mood and Affect: Mood normal          Behavior: Behavior normal            Additional Data:     Labs:    Results from last 7 days   Lab Units 06/19/21  0547 06/15/21  1923   WBC Thousand/uL 5 28 10 05   HEMOGLOBIN g/dL 14 1 13 2   HEMATOCRIT % 47 5* 43 0   PLATELETS Thousands/uL 122* 153   NEUTROS PCT %  --  91*   LYMPHS PCT %  --  1*   MONOS PCT %  --  7   EOS PCT %  --  0     Results from last 7 days   Lab Units 06/19/21  0547 06/15/21  1923   POTASSIUM mmol/L 3 8 3 9   CHLORIDE mmol/L 105 103   CO2 mmol/L 31 28   BUN mg/dL 24 23   CREATININE mg/dL 0 72 1 00   CALCIUM mg/dL 9 1 8 3   ALK PHOS U/L  --  108   ALT U/L  --  26   AST U/L  --  29           * I Have Reviewed All Lab Data Listed Above  * Additional Pertinent Lab Tests Reviewed:  All Labs Within Last 24 Hours Reviewed    Imaging:    Imaging Reports Reviewed Today Include: N/A  Imaging Personally Reviewed by Myself Includes:  N/A    Recent Cultures (last 7 days):     Results from last 7 days   Lab Units 06/16/21  1844   C DIFF TOXIN B BY PCR  Negative       Last 24 Hours Medication List:   Current Facility-Administered Medications   Medication Dose Route Frequency Provider Last Rate    acetaminophen  488 mg Oral Q6H PRN Carlos Farfan MD      aspirin  81 mg Oral Daily Carlos Farfan MD      atorvastatin  20 mg Oral Daily With Jose De Jesus Can MD      calcium carbonate-vitamin D  1 tablet Oral Daily With Breakfast Carlos Farfan MD      furosemide  60 mg Oral BID (diuretic) Trav Renteria MD      heparin (porcine)  5,000 Units Subcutaneous [facility] Carlos Farfan MD      metoprolol tartrate  12 5 mg Oral Daily Carlos Farfan MD      ondansetron  4 mg Intravenous Q6H PRN Trung Holley MD          Today, Patient Was Seen By: Trav Renteria MD    ** Please Note: This note has been constructed using a voice recognition system   **

## 2021-06-19 NOTE — ASSESSMENT & PLAN NOTE
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

## 2021-06-19 NOTE — ASSESSMENT & PLAN NOTE
History of chronic keg swelling 2/2 lymphedema and chronic occlusive DVT of right paired peroneal veins  Worsening over the last 3 days despite compliance with home Lasix regimen  Suspect that leg swelling is secondary to HF and possibly hypoalbuminemia  · Venous duplex negative for acute or chronic DVT in bilateral lower extremities    Plan:  · Compression stockings    · Low-salt diet/fluid restriction  · Lasix 80 mg IV BID -transition to Lasix 60 mg p o  B i d   · From our standpoint stable for discharge, however patient would like to go back to her old facility because she has an electric bed there,  working with facility

## 2021-06-19 NOTE — ASSESSMENT & PLAN NOTE
Results from last 7 days   Lab Units 06/19/21  0547 06/18/21  1209 06/17/21  0506   BUN mg/dL 24 18 20   CREATININE mg/dL 0 72 0 73 0 71     Creatinine slightly above baseline on initial presentation and has returned to her baseline

## 2021-06-20 PROCEDURE — 99232 SBSQ HOSP IP/OBS MODERATE 35: CPT | Performed by: HOSPITALIST

## 2021-06-20 RX ADMIN — FUROSEMIDE 60 MG: 40 TABLET ORAL at 08:17

## 2021-06-20 RX ADMIN — HEPARIN SODIUM 5000 UNITS: 5000 INJECTION INTRAVENOUS; SUBCUTANEOUS at 14:24

## 2021-06-20 RX ADMIN — FUROSEMIDE 60 MG: 40 TABLET ORAL at 17:19

## 2021-06-20 RX ADMIN — ATORVASTATIN CALCIUM 20 MG: 20 TABLET, FILM COATED ORAL at 17:19

## 2021-06-20 RX ADMIN — Medication 1 TABLET: at 08:17

## 2021-06-20 RX ADMIN — HEPARIN SODIUM 5000 UNITS: 5000 INJECTION INTRAVENOUS; SUBCUTANEOUS at 21:54

## 2021-06-20 RX ADMIN — ASPIRIN 81 MG: 81 TABLET, CHEWABLE ORAL at 08:17

## 2021-06-20 RX ADMIN — HEPARIN SODIUM 5000 UNITS: 5000 INJECTION INTRAVENOUS; SUBCUTANEOUS at 06:07

## 2021-06-20 RX ADMIN — Medication 12.5 MG: at 08:17

## 2021-06-20 NOTE — PROGRESS NOTES
[facility]  Progress Note - Lynetta Hamman 1940, 78 y o  female MRN: [id number]  Unit/Bed#: S -01 Encounter: [id number]  Primary Care Provider: Emanuel Jean MD   Date and time admitted to hospital: 6/15/2021  6:45 PM    * Acute respiratory failure with hypoxia [facility])  Assessment & Plan  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  Suspect that respiratory failure is likely related to CHF vs pulmonary HTN (hx of untreated DAVE)  Follows with Dr Geovanna Marion at [facility] for cardiology  · CT chest PE study negative  · NT-proBNP wnl  · Troponin negative  · Echo 6/16/21: LVEF 60%, no RWMA, trace MR, trace TR  · LE duplex - no evidence of acute or chronic DVT bilaterally    No complaints at present - wanting to know the hold up for her stay  Plan:  · Lasix IV transition to 60 mg p o  B i d , tolerating well  · Patient currently on room air  · CM working on placement - patient wanting previous facility     Leg swelling  Assessment & Plan  History of chronic keg swelling 2/2 lymphedema and chronic occlusive DVT of right paired peroneal veins  Worsening over the last 3 days despite compliance with home Lasix regimen  Suspect that leg swelling is secondary to HF and possibly hypoalbuminemia  · Venous duplex negative for acute or chronic DVT in bilateral lower extremities    Patient wouldn't let me touch, "they hurt when you touch " visually, trace to 1+ edema    I/o -3 5L, electrolytes stable from yesterday and no new labs done today since pending placement  Plan:  · Compression stockings not on, encouraged to wear them  · Low-salt diet/fluid restriction  · continue Lasix 60 mg p o  B i d      CKD (chronic kidney disease) stage 3, GFR 30-59 ml/min ([facility])  Assessment & Plan  Results from last 7 days   Lab Units 21  0547 21  1209 21  0506   BUN mg/dL 24 18 20   CREATININE mg/dL 0 72 0 73 0 71     Creatinine slightly above baseline on initial presentation and has returned to her baseline  Essential hypertension  Assessment & Plan  Continue home dose metoprolol and lasix with holding parameters    DAVE (obstructive sleep apnea)  Assessment & Plan  Untreated DAVE  Stopped CPAP as the mask was causing problems with eye swelling  Refusing CPAP during admission  VTE Pharmacologic Prophylaxis:   Pharmacologic: Heparin  Mechanical VTE Prophylaxis in Place: No    Discussions with Specialists or Other Care Team Provider: Nursing, CM    Education and Discussions with Family / Patient: assessment and plan discussed with patient  Current Length of Stay: 4 day(s)    Current Patient Status: Inpatient     Discharge Plan / Estimated Discharge Date:  Pending placement, CM working on it    Code Status: Level 3 - DNAR and DNI      Subjective:   No complaints today  Small BM earlier in AM  Eating and drinking well  Objective:     Vitals:   Temp (24hrs), Av 3 °F (36 8 °C), Min:98 3 °F (36 8 °C), Max:98 3 °F (36 8 °C)    Temp:  [98 3 °F (36 8 °C)] 98 3 °F (36 8 °C)  HR:  [70-95] 70  Resp:  [18] 18  BP: (106-119)/(57-61) 119/61  SpO2:  [90 %-92 %] 90 %  Body mass index is 32 36 kg/m²  Input and Output Summary (last 24 hours): Intake/Output Summary (Last 24 hours) at 2021 0854  Last data filed at 2021 1846  Gross per 24 hour   Intake 360 ml   Output 725 ml   Net -365 ml       Physical Exam:     Physical Exam  Vitals and nursing note reviewed  Constitutional:       Appearance: Normal appearance  She is obese  HENT:      Head: Normocephalic and atraumatic     Cardiovascular:      Rate and Rhythm: Normal rate and regular rhythm  Pulses: Normal pulses  Heart sounds: Normal heart sounds  Pulmonary:      Effort: Pulmonary effort is normal       Breath sounds: Examination of the right-lower field reveals rales  Examination of the left-lower field reveals rales  Rales present  Abdominal:      General: Bowel sounds are normal  There is no distension  Palpations: Abdomen is soft  Tenderness: There is no abdominal tenderness  Musculoskeletal:      Right lower leg: Edema present  Left lower leg: Edema present  Neurological:      Mental Status: She is alert and oriented to person, place, and time  Mental status is at baseline  Psychiatric:         Mood and Affect: Mood normal          Behavior: Behavior normal          Thought Content: Thought content normal          Judgment: Judgment normal          Additional Data:     Labs:    Results from last 7 days   Lab Units 06/19/21  0547 06/15/21  1923   WBC Thousand/uL 5 28 10 05   HEMOGLOBIN g/dL 14 1 13 2   HEMATOCRIT % 47 5* 43 0   PLATELETS Thousands/uL 122* 153   NEUTROS PCT %  --  91*   LYMPHS PCT %  --  1*   MONOS PCT %  --  7   EOS PCT %  --  0     Results from last 7 days   Lab Units 06/19/21  0547 06/15/21  1923   POTASSIUM mmol/L 3 8 3 9   CHLORIDE mmol/L 105 103   CO2 mmol/L 31 28   BUN mg/dL 24 23   CREATININE mg/dL 0 72 1 00   CALCIUM mg/dL 9 1 8 3   ALK PHOS U/L  --  108   ALT U/L  --  26   AST U/L  --  29           * I Have Reviewed All Lab Data Listed Above  * Additional Pertinent Lab Tests Reviewed:  All Labs Within Last 24 Hours Reviewed    Imaging:    Imaging Reports Reviewed Today Include: none  Imaging Personally Reviewed by Myself Includes:  none    Recent Cultures (last 7 days):     Results from last 7 days   Lab Units 06/16/21  1844   C DIFF TOXIN B BY PCR  Negative       Last 24 Hours Medication List:   Current Facility-Administered Medications   Medication Dose Route Frequency Provider Last Rate    acetaminophen  488 mg Oral Q6H PRN Asad Vuong MD      aspirin  81 mg Oral Daily Carlos Farfan MD      atorvastatin  20 mg Oral Daily With Grace Aguirre MD      calcium carbonate-vitamin D  1 tablet Oral Daily With Breakfast Carlos Farfan MD      furosemide  60 mg Oral BID (diuretic) Andrey Reyes MD      heparin (porcine)  5,000 Units Subcutaneous [facility] Carlos Farfan MD      metoprolol tartrate  12 5 mg Oral Daily Carlos Farfan MD      ondansetron  4 mg Intravenous Q6H PRN Asad Vuong MD          Today, Patient Was Seen By: Kati Saldivar MD    ** Please Note: This note has been constructed using a voice recognition system   **

## 2021-06-20 NOTE — ASSESSMENT & PLAN NOTE
History of chronic keg swelling 2/2 lymphedema and chronic occlusive DVT of right paired peroneal veins  Worsening over the last 3 days despite compliance with home Lasix regimen  Suspect that leg swelling is secondary to HF and possibly hypoalbuminemia  · Venous duplex negative for acute or chronic DVT in bilateral lower extremities    Patient wouldn't let me touch, "they hurt when you touch " visually, trace to 1+ edema    I/o -3 5L, electrolytes stable from yesterday and no new labs done today since pending placement  Plan:  · Compression stockings not on, encouraged to wear them  · Low-salt diet/fluid restriction  · continue Lasix 60 mg p o  B i d

## 2021-06-20 NOTE — ASSESSMENT & PLAN NOTE
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  Suspect that respiratory failure is likely related to CHF vs pulmonary HTN (hx of untreated DAVE)  Follows with Dr Vanessa London at [facility] for cardiology  · CT chest PE study negative  · NT-proBNP wnl  · Troponin negative  · Echo 6/16/21: LVEF 60%, no RWMA, trace MR, trace TR  · LE duplex - no evidence of acute or chronic DVT bilaterally    No complaints at present - wanting to know the hold up for her stay  Plan:  · Lasix IV transition to 60 mg p o  B i d , tolerating well     · Patient currently on room air  · CM working on placement - patient wanting previous facility

## 2021-06-21 VITALS
HEART RATE: 87 BPM | SYSTOLIC BLOOD PRESSURE: 119 MMHG | OXYGEN SATURATION: 90 % | WEIGHT: 176.2 LBS | TEMPERATURE: 98.5 F | DIASTOLIC BLOOD PRESSURE: 70 MMHG | HEIGHT: 62 IN | BODY MASS INDEX: 32.42 KG/M2 | RESPIRATION RATE: 18 BRPM

## 2021-06-21 PROCEDURE — 99239 HOSP IP/OBS DSCHRG MGMT >30: CPT | Performed by: HOSPITALIST

## 2021-06-21 PROCEDURE — 97535 SELF CARE MNGMENT TRAINING: CPT

## 2021-06-21 RX ORDER — FUROSEMIDE 20 MG/1
60 TABLET ORAL 2 TIMES DAILY
Qty: 180 TABLET | Refills: 0
Start: 2021-06-21 | End: 2021-07-21

## 2021-06-21 RX ADMIN — Medication 12.5 MG: at 08:39

## 2021-06-21 RX ADMIN — FUROSEMIDE 60 MG: 40 TABLET ORAL at 08:39

## 2021-06-21 RX ADMIN — HEPARIN SODIUM 5000 UNITS: 5000 INJECTION INTRAVENOUS; SUBCUTANEOUS at 06:24

## 2021-06-21 RX ADMIN — ASPIRIN 81 MG: 81 TABLET, CHEWABLE ORAL at 08:39

## 2021-06-21 RX ADMIN — Medication 1 TABLET: at 08:39

## 2021-06-21 NOTE — PLAN OF CARE
Problem: MOBILITY - ADULT  Goal: Maintain or return to baseline ADL function  Description: INTERVENTIONS:  -  Assess patient's ability to carry out ADLs; assess patient's baseline for ADL function and identify physical deficits which impact ability to perform ADLs (bathing, care of mouth/teeth, toileting, grooming, dressing, etc )  - Assess/evaluate cause of self-care deficits   - Assess range of motion  - Assess patient's mobility; develop plan if impaired  - Assess patient's need for assistive devices and provide as appropriate  - Encourage maximum independence but intervene and supervise when necessary  - Involve family in performance of ADLs  - Assess for home care needs following discharge   - Consider OT consult to assist with ADL evaluation and planning for discharge  - Provide patient education as appropriate  Outcome: Progressing     Problem: Prexisting or High Potential for Compromised Skin Integrity  Goal: Skin integrity is maintained or improved  Description: INTERVENTIONS:  - Identify patients at risk for skin breakdown  - Assess and monitor skin integrity  - Assess and monitor nutrition and hydration status  - Monitor labs   - Assess for incontinence   - Turn and reposition patient  - Assist with mobility/ambulation  - Relieve pressure over bony prominences  - Avoid friction and shearing  - Provide appropriate hygiene as needed including keeping skin clean and dry  - Evaluate need for skin moisturizer/barrier cream  - Collaborate with interdisciplinary team   - Patient/family teaching  - Consider wound care consult   Outcome: Progressing

## 2021-06-21 NOTE — PROGRESS NOTES
[facility]  Progress Note - Evonne Stephens 1940, 78 y o  female MRN: [id number]  Unit/Bed#: S -01 Encounter: [id number]  Primary Care Provider: Ignacia Goodwin MD   Date and time admitted to hospital: 6/15/2021  6:45 PM    * Acute respiratory failure with hypoxia [facility])  Assessment & Plan  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 for cardiology  · CT chest PE study negative  · NT-proBNP wnl  · Troponin negative  · Echo 6/16/21: LVEF 60%, no RWMA, trace MR, trace TR  · LE duplex - no evidence of acute or chronic DVT bilaterally    No complaints at present - wanting to know the hold up for her stay  Plan:  · Lasix IV transitioned to 60 mg p o  B i d , tolerating well  · Patient currently on room air  · Patient agreed to short term rehab  CM working on insurance auth  Likely dc back to [facility] tomorrow  CKD (chronic kidney disease) stage 3, GFR 30-59 ml/min ([facility])  Assessment & Plan  Results from last 7 days   Lab Units 06/19/21  0547 06/18/21  1209 06/17/21  0506   BUN mg/dL 24 18 20   CREATININE mg/dL 0 72 0 73 0 71     Creatinine slightly above baseline on initial presentation and has returned to her baseline  Leg swelling  Assessment & Plan  History of chronic keg swelling 2/2 lymphedema and chronic occlusive DVT of right paired peroneal veins  Worsening over the last 3 days despite compliance with home Lasix regimen   Suspect that leg swelling is secondary to HF and possibly hypoalbuminemia  · Venous duplex negative for acute or chronic DVT in bilateral lower extremities    Patient wouldn't let me touch, "they hurt when you touch " visually, trace to 1+ edema  I/o -3 5L, electrolytes stable from yesterday and no new labs done today since pending placement  Plan:  · Compression stockings not on, encouraged to wear them  · Low-salt diet/fluid restriction  · continue Lasix 60 mg p o  B i d      Essential hypertension  Assessment & Plan  Continue home dose metoprolol and lasix with holding parameters    DAVE (obstructive sleep apnea)  Assessment & Plan  Untreated DAVE  Stopped CPAP as the mask was causing problems with eye swelling  Refusing CPAP during admission  Gastroenteritis like symptoms-resolved as of 6/16/2021  Assessment & Plan  Reports nausea, vomiting, abdominal cramps and 3 episodes of diarrhea day prior to admission  Currently resolved  Electrolytes are within normal  No leucocytosis or fever  On physical exam she does not look dehydrated  CT abdomen is unremarkable  Lipase normal  Trop negative  Plan:  · Stool studies ordered but not collected as she has not had any additional diarrhea  · Advance diet as tolerated  · Zofran p r n  For nausea  · Will hold on IV fluids due to concerns for acute heart failure  · Monitor BMP and replete electrolytes as needed        VTE Pharmacologic Prophylaxis:   VTE Score: 4 High Risk (Score >/= 5) - Pharmacological DVT Prophylaxis Ordered: Heparin  Sequential Compression Devices Ordered  Mechanical VTE Prophylaxis in Place: Yes    Patient Centered Rounds: I have performed bedside rounds with nursing staff today  Discussions with Specialists or Other Care Team Provider: PT/OT    Education and Discussions with Family / Patient: Attempted to update  (unknown) via phone  Left voicemail      Current Length of Stay: 5 day(s)    Current Patient Status: Inpatient     Discharge Plan / Estimated Discharge Date: Anticipate discharge tomorrow to rehab facility  Code Status: Level 3 - DNAR and DNI      Subjective:   Patient is agreeable to STR  No new complaints today  Objective:     Vitals:   Temp (24hrs), Av 2 °F (36 8 °C), Min:98 °F (36 7 °C), Max:98 5 °F (36 9 °C)    Temp:  [98 °F (36 7 °C)-98 5 °F (36 9 °C)] 98 5 °F (36 9 °C)  HR:  [71-87] 87  Resp:  [16-18] 18  BP: ()/(58-70) 119/70  SpO2:  [90 %-91 %] 90 %  Body mass index is 32 23 kg/m²  Input and Output Summary (last 24 hours): Intake/Output Summary (Last 24 hours) at 2021 1147  Last data filed at 2021 2201  Gross per 24 hour   Intake --   Output 200 ml   Net -200 ml       Physical Exam:     Physical Exam  Constitutional:       General: She is not in acute distress  HENT:      Head: Normocephalic and atraumatic  Mouth/Throat:      Mouth: Mucous membranes are moist       Pharynx: Oropharynx is clear  Eyes:      Extraocular Movements: Extraocular movements intact  Pupils: Pupils are equal, round, and reactive to light  Cardiovascular:      Rate and Rhythm: Normal rate and regular rhythm  Heart sounds: No murmur heard  Pulmonary:      Effort: Pulmonary effort is normal       Breath sounds: Normal breath sounds  Abdominal:      General: Abdomen is flat  Palpations: Abdomen is soft  Musculoskeletal:         General: Normal range of motion  Cervical back: Normal range of motion and neck supple  Right lower leg: No edema  Left lower leg: No edema  Skin:     General: Skin is warm and dry  Neurological:      General: No focal deficit present  Mental Status: She is alert and oriented to person, place, and time  Mental status is at baseline  Psychiatric:         Mood and Affect: Mood normal          Thought Content:  Thought content normal           Additional Data:     Labs:  Results from last 7 days   Lab Units 21  0547 06/15/21  1923   WBC Thousand/uL 5 28 10 05   HEMOGLOBIN g/dL 14 1 13 2   HEMATOCRIT % 47 5* 43 0   PLATELETS Thousands/uL 122* 153   NEUTROS PCT %  --  91*   LYMPHS PCT %  --  1*   MONOS PCT %  --  7   EOS PCT %  --  0     Results from last 7 days   Lab Units 06/19/21  0547 06/15/21  1923   SODIUM mmol/L 142 140   POTASSIUM mmol/L 3 8 3 9   CHLORIDE mmol/L 105 103   CO2 mmol/L 31 28   BUN mg/dL 24 23   CREATININE mg/dL 0 72 1 00   ANION GAP mmol/L 6 9   CALCIUM mg/dL 9 1 8 3   ALBUMIN g/dL  --  3 0*   TOTAL BILIRUBIN mg/dL  --  0 60   ALK PHOS U/L  --  108   ALT U/L  --  26   AST U/L  --  29   GLUCOSE RANDOM mg/dL 90 159*                       Imaging: Reviewed radiology reports from this admission including: abdominal/pelvic CT scan    Recent Cultures (last 7 days):     Results from last 7 days   Lab Units 06/16/21  1844   C DIFF TOXIN B BY PCR  Negative       Lines/Drains:  Invasive Devices     Peripheral Intravenous Line            Peripheral IV 06/20/21 Left Arm 1 day                Telemetry:        Last 24 Hours Medication List:   Current Facility-Administered Medications   Medication Dose Route Frequency Provider Last Rate    acetaminophen  488 mg Oral Q6H PRN Carlos Farfan MD      aspirin  81 mg Oral Daily Carlos Farfan MD      atorvastatin  20 mg Oral Daily With Adeline Reyes MD      calcium carbonate-vitamin D  1 tablet Oral Daily With Breakfast Carlos Farfan MD      furosemide  60 mg Oral BID (diuretic) Stefanie Barrera MD      heparin (porcine)  5,000 Units Subcutaneous Q8H [address] Carlos Farfan MD      metoprolol tartrate  12 5 mg Oral Daily Carlos Farfan MD      ondansetron  4 mg Intravenous Q6H PRN Benson Rosario MD          Today, Patient Was Seen By: Chauncey John DO    ** Please Note: This note has been constructed using a voice recognition system   **

## 2021-06-21 NOTE — CASE MANAGEMENT
Cm contacted [facility] wellness office where pt lives to review therapy recommendations  Per therapy, pt is a SB with ambulation and is able to ambulate the required distance  Facility is in agreement with pt returning [facility]  CM met with pt to review this with her  Pt is quite happy that she is able to return to her apartment  Pt states she needs assistance with transportation  Cm review cost of WCV  Pt is in agreement  Referral has been made to [address] via [facility] requesting a WCV  time for 1430

## 2021-06-21 NOTE — ASSESSMENT & PLAN NOTE
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  Suspect that respiratory failure is likely related to CHF vs pulmonary HTN (hx of untreated DAVE)  Follows with Dr Shine Arora at [facility] for cardiology  · CT chest PE study negative  · NT-proBNP wnl  · Troponin negative  · Echo 6/16/21: LVEF 60%, no RWMA, trace MR, trace TR  · LE duplex - no evidence of acute or chronic DVT bilaterally    No complaints at present - wanting to know the hold up for her stay  Plan:  · Lasix IV transitioned to 60 mg p o  B i d , tolerating well  · Patient currently on room air  · Patient agreed to short term rehab  CM working on insurance auth  [facility]ik[facility]

## 2021-06-21 NOTE — ASSESSMENT & PLAN NOTE
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  Suspect that respiratory failure is likely related to CHF vs pulmonary HTN (hx of untreated DAVE)  Follows with Dr Lisa Cobb at [facility] for cardiology  · CT chest PE study negative  · NT-proBNP wnl  · Troponin negative  · Echo 6/16/21: LVEF 60%, no RWMA, trace MR, trace TR  · LE duplex - no evidence of acute or chronic DVT bilaterally    No complaints at present - wanting to know the hold up for her stay  Plan:  · Lasix IV transitioned to 60 mg p o  B i d , tolerating well  · Patient currently on room air  · Patient agreed to short term rehab  CM working on insurance auth  Will dc today

## 2021-06-21 NOTE — PLAN OF CARE
Problem: OCCUPATIONAL THERAPY ADULT  Goal: Performs self-care activities at highest level of function for planned discharge setting  See evaluation for individualized goals  Description: Treatment Interventions: ADL retraining, Functional transfer training, UE strengthening/ROM, Endurance training, Patient/family training, Equipment evaluation/education, Continued evaluation, Energy conservation, Activityengagement  Equipment Recommended: Bedside commode, Shower/Tub chair with back ($), Reacher ($), Sock aid ($)       See flowsheet documentation for full assessment, interventions and recommendations  Outcome: Progressing  Note: Limitation: Decreased ADL status, Decreased endurance, Decreased high-level ADLs     Assessment: Pt seen for skilled OT session from [id number]  Pt cooperative and agreeable to session  Pt engaged in grooming tasks w/ S standing at the sink  Pt participated in LBD w/ min A d/t threading BLE into underwear  Recommended reacher to pt to A w/ dressing tasks  Pt required S for toileting A during session  Pt demonstrate sit <> stand functional transfers and functional mobility w/ close S  Pt used RW for functional mobility tasks  Pt improved from evaluation for LBD, functional transfers sit <> stand, functional mobility  Goals targeted during session include grooming, functional mobility, functional transfers, LBD, standing balance/tolerance, toileting, and following 2-step directions  OT will continue to follow while in acute care  Continue to recommend PLOF w/ OT services at this time        OT Discharge Recommendation: Return to facility with rehabilitation services

## 2021-06-21 NOTE — ASSESSMENT & PLAN NOTE
Patient discharged home. Discharge instructions, follow up appointments and prescriptions reviewed, no questions at this time. Patient left floor in a wheel chair with her daughter, care relinquished at this time.    Untreated DAVE  Stopped CPAP as the mask was causing problems with eye swelling  Refusing CPAP during admission

## 2021-06-21 NOTE — OCCUPATIONAL THERAPY NOTE
OccupationalTherapy Progress Note     Patient Name: Luca Zuniga  XCCZC'E Date: 6/21/2021  Problem List  Principal Problem:    Acute respiratory failure with hypoxia ([facility] [address] )  Active Problems:    DAVE (obstructive sleep apnea)    Essential hypertension    Leg swelling    CKD (chronic kidney disease) stage 3, GFR 30-59 ml/min ([facility] [address] )            06/21/21 1049   OT Last Visit   OT Visit Date 06/21/21  (Monday)   Note Type   Note Type Treatment for insurance authorization   Restrictions/Precautions   Weight Bearing Precautions Per Order No   Other Precautions Limb alert; Chair Alarm; Bed Alarm; Fall Risk   Pain Assessment   Pain Assessment Tool Pain Assessment not indicated - pt denies pain   ADL   Where Assessed Chair   Grooming Assistance 5  Supervision/Setup   Grooming Deficit Supervision/safety; Increased time to complete  (Standing at sink)   Grooming Comments Pt stood at sink to wash/dry hands w/o UE support, required S for safety, increased time   LB Dressing Assistance 4  Minimal Assistance   LB Dressing Deficit Thread RLE into underwear; Thread LLE into underwear; Increased time to complete   LB Dressing Comments Pt received A for threading of underwear BLE, pt does receive A for dressing at [facility]  Pt reports she uses a reacher, although recently broke it and needs to buy a new one   Toileting Assistance  5  Supervision/Setup   Toileting Deficit Steadying;Supervison/safety; Increased time to complete   Functional Standing Tolerance   Time ~2 minutes while standing at sink completing grooming tasks   Activity grooming tasks   Bed Mobility   Additional Comments Pt seated in chair upon arrival  End tx session, pt seated in chair w/ call bell, phone within reach, chair alarm activated, and needs met   Transfers   Sit to Stand 5  Supervision  (close sup)   Additional items Assist x 1; Armrests; Increased time required   Stand to Sit 5  Supervision  (close sup)   Additional items Increased time required;Armrests   Toilet transfer 5  Supervision   Additional items Increased time required;Standard toilet  (gbs, close supervision)   Additional Comments Stood at sink for ~2 minutes w/ decreased UE support   Functional Mobility   Functional Mobility 5  Supervision  (close supervision)   Additional Comments Pt engaged in functional mobility, chair <> toilet  Foward flexed head/neck, Kyphotic posture using RW, + time   Additional items Rolling walker   Toilet Transfers   Toilet Transfer From Rolling walker   Toilet Transfer Type To and from   Toilet Transfer to Standard toilet   Toilet Transfer Technique Ambulating   Toilet Transfers Supervision   Cognition   Overall Cognitive Status [facility]   Arousal/Participation Alert   Attention Within functional limits   Orientation Level Oriented to person;Oriented to situation   Memory Within functional limits   Following Commands Follows one step commands without difficulty   Comments Pt identified by full name,   Pt was upset during session d/t recommendation of rehab  Pt wishes to return to [facility]  Pt educated from OT standpoint, recommendation is for PLOF  Pt seemed to become calmer w/ recommendation and requested a session w/ PT  Rajiv DIMAS made aware   Activity Tolerance   Activity Tolerance Patient limited by fatigue   Medical Staff Made Aware Nader SHORE PTA, Joey  According to RNMarbella pt appropriate to see   Assessment   Assessment Pt seen for skilled OT session from [id number]  Pt cooperative and agreeable to session  Pt engaged in grooming tasks w/ S standing at the sink  Pt participated in LBD w/ min A d/t threading BLE into underwear  Recommended reacher to pt to A w/ dressing tasks  Pt required S for toileting A during session  Pt demonstrate sit <> stand functional transfers and functional mobility w/ close S  Pt used RW for functional mobility tasks  Pt improved from evaluation for LBD, functional transfers sit <> stand, functional mobility   Goals targeted during session include grooming, functional mobility, functional transfers, LBD, standing balance/tolerance, toileting, and following 2-step directions  OT will continue to follow while in acute care  Continue to recommend PLOF w/ OT services at this time  Plan   Treatment Interventions ADL retraining;Functional transfer training;UE strengthening/ROM; Endurance training;Patient/family training;Equipment evaluation/education; Compensatory technique education;Continued evaluation; Energy conservation; Activityengagement   Goal Expiration Date 06/21/21   OT Treatment Day 1   OT Frequency 2-3x/wk   Recommendation   OT Discharge Recommendation Return to facility with rehabilitation services   Equipment Recommended Bedside commode; Shower/Tub chair with back ($);Reacher ($);Sock aid ($)   Commode Type Standard   AM-PAC Daily Activity Inpatient   Lower Body Dressing 3   Bathing 3   Toileting 3   Upper Body Dressing 3   Grooming 4   Eating 4   Daily Activity Raw Score 20   Daily Activity Standardized Score (Calc for Raw Score >=11) 42 03   AM-[facility] Applied Cognition Inpatient   Following a Speech/Presentation 3   Understanding Ordinary Conversation 3   Taking Medications 3   Remembering Where Things Are Placed or Put Away 3   Remembering List of 4-5 Errands 3   Taking Care of Complicated Tasks 3   Applied Cognition Raw Score 18   Applied Cognition Standardized Score 38 07   Barthel Index   Feeding 10   Bathing 0   Grooming Score 5   Dressing Score 5   Bladder Score 10   Bowels Score 10   Toilet Use Score 10   Transfers (Bed/Chair) Score 10   Mobility (Level Surface) Score 0   Stairs Score 0   Barthel Index Score 60     The patient's raw score on the AM-PAC Daily Activity inpatient short form is 20, standardized score is 42 03, greater than 39 4  Patients at this level are likely to benefit from discharge to home  Please refer to the recommendation of the Occupational Therapist for safe discharge planning      Hugo Shin OTS

## 2021-06-21 NOTE — CASE MANAGEMENT
Cm received call from Sherri Sims at [facility] who was able to arrange transportation with [address] for a WCV at 1430  [facility], pt, nursing and SLIM aware of transportation arrangements and DC  IMM reviewed with patient  patient agree with discharge determination

## 2021-06-21 NOTE — DISCHARGE INSTR - AVS FIRST PAGE
Dear Mukund Brock,     It was our pleasure to care for you here at [facility]  It is our hope that we were always able to exceed the expected standards for your care during your stay  You were hospitalized due to acute on chronic respiratory failure  You were cared for on the 4th floor by José Miguel Pina DO under the service of Wily Carreno MD with the [facility] who covers for your primary care physician (PCP), Courtney Shine MD, while you were hospitalized  If you have any questions or concerns related to this hospitalization, you may contact us at [phone]  For follow up as well as any medication refills, we recommend that you follow up with your primary care physician  A registered nurse will reach out to you by phone within a few days after your discharge to answer any additional questions that you may have after going home  However, at this time we provide for you here, the most important instructions / recommendations at discharge:     · Notable Medication Adjustments -   · Please increase your lasix from 40mg twice daily to 60mg twice daily  · Please decrease your metoprolol from 25mg daily to 12 5mg daily  · Testing Required after Discharge -   ·   · Important follow up information -   · None  · Other Instructions -   · None  · Please review this entire after visit summary as additional general instructions including medication list, appointments, activity, diet, any pertinent wound care, and other additional recommendations from your care team that may be provided for you        Sincerely,     José Miguel Pina DO

## 2021-06-21 NOTE — DISCHARGE SUMMARY
[facility]  Discharge- Evonne Stephens 1940, 78 y o  female MRN: [id number]  Unit/Bed#: S -01 Encounter: [id number]  Primary Care Provider: Ignacia Goodwin MD   Date and time admitted to hospital: 6/15/2021  6:45 PM    * Acute respiratory failure with hypoxia 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  Suspect that respiratory failure is likely related to CHF vs pulmonary HTN (hx of untreated DAVE)  Follows with Dr Ashley Mendoza at [facility] for cardiology  · CT chest PE study negative  · NT-proBNP wnl  · Troponin negative  · Echo 6/16/21: LVEF 60%, no RWMA, trace MR, trace TR  · LE duplex - no evidence of acute or chronic DVT bilaterally    No complaints at present - wanting to know the hold up for her stay  Plan:  · Lasix IV transitioned to 60 mg p o  B i d , tolerating well  · Patient currently on room air  · Patient agreed to short term rehab  CM working on insurance auth  Will dc today  CKD (chronic kidney disease) stage 3, GFR 30-59 ml/min ([facility])  Assessment & Plan  Results from last 7 days   Lab Units 06/19/21  0547 06/18/21  1209 06/17/21  0506   BUN mg/dL 24 18 20   CREATININE mg/dL 0 72 0 73 0 71     Creatinine slightly above baseline on initial presentation and has returned to her baseline  Leg swelling  Assessment & Plan  History of chronic keg swelling 2/2 lymphedema and chronic occlusive DVT of right paired peroneal veins  Worsening over the last 3 days despite compliance with home Lasix regimen  Suspect that leg swelling is secondary to HF and possibly hypoalbuminemia    · Venous duplex negative for acute or chronic DVT in bilateral lower extremities    Patient wouldn't let me touch, "they hurt when you touch " visually, trace to 1+ edema  I/o -3 5L, electrolytes stable from yesterday and no new labs done today since pending placement  Plan:  · Compression stockings not on, encouraged to wear them  · Low-salt diet/fluid restriction  · continue Lasix 60 mg p o  B i d      Essential hypertension  Assessment & Plan  Continue home dose metoprolol and lasix with holding parameters    DAVE (obstructive sleep apnea)  Assessment & Plan  Untreated DAVE  Stopped CPAP as the mask was causing problems with eye swelling  Refusing CPAP during admission  Gastroenteritis like symptoms-resolved as of 6/16/2021  Assessment & Plan  Reports nausea, vomiting, abdominal cramps and 3 episodes of diarrhea day prior to admission  Currently resolved  Electrolytes are within normal  No leucocytosis or fever  On physical exam she does not look dehydrated  CT abdomen is unremarkable  Lipase normal  Trop negative  Plan:  · Stool studies ordered but not collected as she has not had any additional diarrhea  · Advance diet as tolerated  · Zofran p r n  For nausea  · Will hold on IV fluids due to concerns for acute heart failure  · Monitor BMP and replete electrolytes as needed      Discharging Resident Physician: Yuliana Winston DO  Attending: Angelique Garcia MD  PCP: Dwight Hernández MD  Admission Date: 6/15/2021  Discharge Date: 06/21/21    Disposition:     [address] at [facility]    Reason for Admission: acute respiratory failure with hypoxia     Consultations During Hospital Stay:  · None    Procedures Performed:     · None    Significant Findings / Test Results:     PE Study with CT abdomen & pelvis with contrast    Result Date: 6/15/2021  · Impression: No acute findings in the chest; no pulmonary arterial embolism or pulmonary infiltrate/consolidation   No acute inflammatory changes in the abdomen or pelvis  Workstation performed: [id number]     Incidental Findings:   · None     Test Results Pending at Discharge (will require follow up): · None     Outpatient Tests Requested:  · none    Complications:  none    Hospital Course:     Ina Casillas is a 78 y o  female patient who originally presented to the hospital on 6/15/2021 due to worsening shortness of breath and lower extremity swelling consistent with acute on chronic CHF  CT chest PE study was negative  BNP WNL  Trop Negative  Echo showing LVEF 60%  No evidence of DVT  Continued on IV diureseis  Eventually transitioned back to PO lasix  Deemed appropriate by PT/OT for SNF rehab  Will be discharged to [facility] today  Condition at Discharge: stable     Discharge Day Visit / Exam:     Subjective:  No new complaints  Ready to be discharged to SNF  Patient is anxious to get home to apartment at [facility]  She understands that she will require rehab prior to going back to her apartment  She is agreeable  Vitals: Blood Pressure: 119/70 (06/21/21 0700)  Pulse: 87 (06/21/21 0700)  Temperature: 98 5 °F (36 9 °C) (06/21/21 0700)  Temp Source: Oral (06/21/21 0700)  Respirations: 18 (06/21/21 0700)  Height: 5' 2" (157 5 cm) (06/15/21 1849)  Weight - Scale: 79 9 kg (176 lb 3 2 oz) (06/21/21 0600)  SpO2: 90 % (06/21/21 0700)  Exam:   Physical Exam  Constitutional:       General: She is not in acute distress  HENT:      Head: Normocephalic and atraumatic  Mouth/Throat:      Mouth: Mucous membranes are moist       Pharynx: Oropharynx is clear  Eyes:      Extraocular Movements: Extraocular movements intact  Pupils: Pupils are equal, round, and reactive to light  Cardiovascular:      Rate and Rhythm: Normal rate and regular rhythm  Pulmonary:      Effort: Pulmonary effort is normal       Breath sounds: Normal breath sounds  Abdominal:      General: Abdomen is flat  Bowel sounds are normal       Palpations: Abdomen is soft  Musculoskeletal:         General: Normal range of motion  Cervical back: Normal range of motion and neck supple  Right lower leg: No edema  Left lower leg: No edema  Skin:     General: Skin is warm and dry  Neurological:      General: No focal deficit present  Mental Status: She is alert and oriented to person, place, and time  Mental status is at baseline  Psychiatric:         Mood and Affect: Mood normal          Thought Content: Thought content normal          Discussion with Family: Khalida Ulrich (friend): attempted to contact  No answer  Discharge instructions/Information to patient and family:   See after visit summary for information provided to patient and family  Provisions for Follow-Up Care:  See after visit summary for information related to follow-up care and any pertinent home health orders  Planned Readmission: None     Discharge Medications:  See after visit summary for reconciled discharge medications provided to patient and family        ** Please Note: This note has been constructed using a voice recognition system **

## 2021-06-22 NOTE — UTILIZATION REVIEW
Notification of Discharge   This is a Notification of Discharge from our facility [address]  Please be advised that this patient has been discharge from our facility  Below you will find the admission and discharge date and time including the patients disposition  UTILIZATION REVIEW CONTACT:  Aracelis Engle  Utilization   Network Utilization Review Department  Phone: [phone] x carefully listen to the prompts  All voicemails are confidential   Email: [email or web address]     PHYSICIAN ADVISORY SERVICES:  FOR [id number]-[id number]-[id number] REVIEW - MEDICAL NECESSITY DENIAL  Phone: [phone]  Fax: [phone]  Email: [email or web address]     PRESENTATION DATE: 6/15/2021  6:45 PM  OBERVATION ADMISSION DATE:   INPATIENT ADMISSION DATE: 6/16/21 11:57 AM   DISCHARGE DATE: 6/21/2021  3:08 PM  DISPOSITION: Home/Self Care Home/Self Care      IMPORTANT INFORMATION:  Send all requests for admission clinical reviews, approved or denied determinations and any other requests to dedicated fax number below belonging to the campus where the patient is receiving treatment   List of dedicated fax numbers:  [address] DENIALS (Administrative/Medical Necessity) [phone]   [address] (Maternity/NICU/Pediatrics) [phone] 54[address] [facility] [phone]   Shavon Plunkett [phone]   [facility] [phone]   [place] [address] [phone]   [facility]  [phone]   [address] [facility], [facility]  [address] [facility] [address] [facility] [phone]

## 2021-09-02 ENCOUNTER — NEW PATIENT (OUTPATIENT)
Dept: URBAN - METROPOLITAN AREA CLINIC 6 | Facility: CLINIC | Age: 81
End: 2021-09-02

## 2021-09-02 DIAGNOSIS — H25.813: ICD-10-CM

## 2021-09-02 DIAGNOSIS — H35.3221: ICD-10-CM

## 2021-09-02 PROCEDURE — 92004 COMPRE OPH EXAM NEW PT 1/>: CPT

## 2021-09-02 ASSESSMENT — TONOMETRY
OD_IOP_MMHG: 20
OS_IOP_MMHG: 20

## 2021-09-02 ASSESSMENT — VISUAL ACUITY
OU_OTHER: @14""
OU_CC: J16
OD_CC: CF 1FT
OS_CC: 20/400

## 2021-09-28 ENCOUNTER — HOSPITAL ENCOUNTER (OUTPATIENT)
Dept: INFUSION CENTER | Facility: HOSPITAL | Age: 81
Discharge: HOME | End: 2021-09-28
Payer: COMMERCIAL

## 2021-09-28 VITALS
HEART RATE: 84 BPM | TEMPERATURE: 97.7 F | RESPIRATION RATE: 18 BRPM | DIASTOLIC BLOOD PRESSURE: 60 MMHG | SYSTOLIC BLOOD PRESSURE: 124 MMHG | OXYGEN SATURATION: 91 %

## 2021-09-28 PROCEDURE — M0243 CASIRIVI AND IMDEVI INFUSION: HCPCS | Performed by: INTERNAL MEDICINE

## 2021-09-28 RX ORDER — ALBUTEROL SULFATE 90 UG/1
3 AEROSOL, METERED RESPIRATORY (INHALATION) ONCE AS NEEDED
Status: DISCONTINUED | OUTPATIENT
Start: 2021-09-28 | End: 2021-10-01 | Stop reason: HOSPADM

## 2021-09-28 RX ORDER — ACETAMINOPHEN 325 MG/1
650 TABLET ORAL ONCE AS NEEDED
Status: DISCONTINUED | OUTPATIENT
Start: 2021-09-28 | End: 2021-10-01 | Stop reason: HOSPADM

## 2021-09-28 RX ORDER — ONDANSETRON 2 MG/ML
4 INJECTION INTRAMUSCULAR; INTRAVENOUS ONCE AS NEEDED
Status: DISCONTINUED | OUTPATIENT
Start: 2021-09-28 | End: 2021-10-01 | Stop reason: HOSPADM

## 2021-09-28 RX ORDER — SODIUM CHLORIDE 9 MG/ML
20 INJECTION, SOLUTION INTRAVENOUS CONTINUOUS
Status: DISCONTINUED | OUTPATIENT
Start: 2021-09-28 | End: 2021-10-01 | Stop reason: HOSPADM

## 2021-09-28 RX ADMIN — IMDEVIMAB 1200 MG COMBINED: 1332 INJECTION, SOLUTION, CONCENTRATE INTRAVENOUS at 10:43

## 2021-09-28 RX ADMIN — SODIUM CHLORIDE 20 ML/HR: 0.9 INJECTION, SOLUTION INTRAVENOUS at 10:43

## 2021-11-12 ENCOUNTER — PRE-OP CATARACT MEASUREMENTS (OUTPATIENT)
Dept: URBAN - METROPOLITAN AREA CLINIC 6 | Facility: CLINIC | Age: 81
End: 2021-11-12

## 2021-11-12 DIAGNOSIS — H25.813: ICD-10-CM

## 2021-11-12 PROCEDURE — G8427 DOCREV CUR MEDS BY ELIG CLIN: HCPCS

## 2021-11-12 PROCEDURE — 1036F TOBACCO NON-USER: CPT

## 2021-11-12 PROCEDURE — 92136 OPHTHALMIC BIOMETRY: CPT

## 2021-11-12 PROCEDURE — 92012 INTRM OPH EXAM EST PATIENT: CPT

## 2021-11-12 ASSESSMENT — TONOMETRY
OS_IOP_MMHG: 18
OD_IOP_MMHG: 18

## 2021-11-12 ASSESSMENT — VISUAL ACUITY: OS_SC: CF 3FT

## 2021-11-22 ENCOUNTER — SURGERY/PROCEDURE (OUTPATIENT)
Dept: URBAN - METROPOLITAN AREA SURGICAL CENTER 6 | Facility: SURGICAL CENTER | Age: 81
End: 2021-11-22

## 2021-11-22 DIAGNOSIS — H25.21: ICD-10-CM

## 2021-11-22 DIAGNOSIS — H25.811: ICD-10-CM

## 2021-11-22 PROCEDURE — 66982 XCAPSL CTRC RMVL CPLX WO ECP: CPT

## 2021-11-23 ENCOUNTER — 1 DAY POST-OP (OUTPATIENT)
Dept: URBAN - METROPOLITAN AREA CLINIC 6 | Facility: CLINIC | Age: 81
End: 2021-11-23

## 2021-11-23 DIAGNOSIS — Z96.1: ICD-10-CM

## 2021-11-23 PROCEDURE — G8427 DOCREV CUR MEDS BY ELIG CLIN: HCPCS

## 2021-11-23 PROCEDURE — 1036F TOBACCO NON-USER: CPT

## 2021-11-23 PROCEDURE — 99024 POSTOP FOLLOW-UP VISIT: CPT

## 2021-11-23 ASSESSMENT — TONOMETRY
OD_IOP_MMHG: 7
OS_IOP_MMHG: 13

## 2021-11-23 ASSESSMENT — VISUAL ACUITY
OD_SC: 20/400
OS_SC: CF 3FT

## 2021-12-03 ENCOUNTER — 1 WEEK POST-OP (OUTPATIENT)
Dept: URBAN - METROPOLITAN AREA CLINIC 6 | Facility: CLINIC | Age: 81
End: 2021-12-03

## 2021-12-03 DIAGNOSIS — H25.812: ICD-10-CM

## 2021-12-03 DIAGNOSIS — Z96.1: ICD-10-CM

## 2021-12-03 PROCEDURE — 99024 POSTOP FOLLOW-UP VISIT: CPT

## 2021-12-03 PROCEDURE — 76519 ECHO EXAM OF EYE: CPT | Mod: 26,LT

## 2021-12-03 PROCEDURE — G8427 DOCREV CUR MEDS BY ELIG CLIN: HCPCS

## 2021-12-03 PROCEDURE — 1036F TOBACCO NON-USER: CPT

## 2021-12-03 ASSESSMENT — TONOMETRY
OS_IOP_MMHG: 20
OD_IOP_MMHG: 18

## 2021-12-03 ASSESSMENT — VISUAL ACUITY
OU_SC: J16
OS_SC: CF 2FT

## 2021-12-06 ENCOUNTER — PREPPED CHART (OUTPATIENT)
Dept: URBAN - METROPOLITAN AREA CLINIC 6 | Facility: CLINIC | Age: 81
End: 2021-12-06

## 2021-12-06 ENCOUNTER — EMERGENCY VISIT (OUTPATIENT)
Dept: URBAN - METROPOLITAN AREA CLINIC 6 | Facility: CLINIC | Age: 81
End: 2021-12-06

## 2021-12-06 DIAGNOSIS — Z96.1: ICD-10-CM

## 2021-12-06 DIAGNOSIS — H04.121: ICD-10-CM

## 2021-12-06 PROCEDURE — 1036F TOBACCO NON-USER: CPT

## 2021-12-06 PROCEDURE — 99024 POSTOP FOLLOW-UP VISIT: CPT

## 2021-12-06 PROCEDURE — G8427 DOCREV CUR MEDS BY ELIG CLIN: HCPCS

## 2021-12-06 ASSESSMENT — VISUAL ACUITY
OD_SC: 20/60
OS_SC: CF 2FT

## 2021-12-06 ASSESSMENT — TONOMETRY
OD_IOP_MMHG: 10
OS_IOP_MMHG: 17

## 2021-12-13 ENCOUNTER — SURGERY/PROCEDURE (OUTPATIENT)
Dept: URBAN - METROPOLITAN AREA SURGICAL CENTER 6 | Facility: SURGICAL CENTER | Age: 81
End: 2021-12-13

## 2021-12-13 DIAGNOSIS — Z96.1: ICD-10-CM

## 2021-12-13 DIAGNOSIS — H25.812: ICD-10-CM

## 2021-12-13 PROCEDURE — 66982 XCAPSL CTRC RMVL CPLX WO ECP: CPT | Mod: 79,LT

## 2021-12-14 ENCOUNTER — 1 DAY POST-OP (OUTPATIENT)
Dept: URBAN - METROPOLITAN AREA CLINIC 6 | Facility: CLINIC | Age: 81
End: 2021-12-14

## 2021-12-14 ENCOUNTER — PREPPED CHART (OUTPATIENT)
Dept: URBAN - METROPOLITAN AREA CLINIC 6 | Facility: CLINIC | Age: 81
End: 2021-12-14

## 2021-12-14 DIAGNOSIS — Z96.1: ICD-10-CM

## 2021-12-14 DIAGNOSIS — H35.3230: ICD-10-CM

## 2021-12-14 PROCEDURE — 1036F TOBACCO NON-USER: CPT

## 2021-12-14 PROCEDURE — 99024 POSTOP FOLLOW-UP VISIT: CPT

## 2021-12-14 PROCEDURE — G8427 DOCREV CUR MEDS BY ELIG CLIN: HCPCS

## 2021-12-14 PROCEDURE — 2019F DILATED MACUL EXAM DONE: CPT

## 2021-12-14 ASSESSMENT — TONOMETRY
OD_IOP_MMHG: 12
OS_IOP_MMHG: 14

## 2021-12-14 ASSESSMENT — VISUAL ACUITY
OS_SC: 20/400
OS_OTHER: 1 DAY POST OP
OD_SC: 20/60

## 2021-12-21 ENCOUNTER — 1 WEEK POST-OP (OUTPATIENT)
Dept: URBAN - METROPOLITAN AREA CLINIC 6 | Facility: CLINIC | Age: 81
End: 2021-12-21

## 2021-12-21 DIAGNOSIS — H35.3231: ICD-10-CM

## 2021-12-21 DIAGNOSIS — Z96.1: ICD-10-CM

## 2021-12-21 DIAGNOSIS — H20.021: ICD-10-CM

## 2021-12-21 PROCEDURE — 2019F DILATED MACUL EXAM DONE: CPT

## 2021-12-21 PROCEDURE — G8427 DOCREV CUR MEDS BY ELIG CLIN: HCPCS

## 2021-12-21 PROCEDURE — 1036F TOBACCO NON-USER: CPT

## 2021-12-21 PROCEDURE — 99024 POSTOP FOLLOW-UP VISIT: CPT

## 2021-12-21 ASSESSMENT — TONOMETRY
OD_IOP_MMHG: 13
OS_IOP_MMHG: 15

## 2021-12-21 ASSESSMENT — VISUAL ACUITY
OS_PH: 20/200+1
OD_SC: 20/100+1
OS_SC: 20/200

## 2022-01-04 ENCOUNTER — FOLLOW UP (OUTPATIENT)
Dept: URBAN - METROPOLITAN AREA CLINIC 6 | Facility: CLINIC | Age: 82
End: 2022-01-04

## 2022-01-04 DIAGNOSIS — H35.3231: ICD-10-CM

## 2022-01-04 DIAGNOSIS — Z96.1: ICD-10-CM

## 2022-01-04 DIAGNOSIS — H20.021: ICD-10-CM

## 2022-01-04 PROCEDURE — 99024 POSTOP FOLLOW-UP VISIT: CPT

## 2022-01-04 ASSESSMENT — VISUAL ACUITY
OS_SC: 20/100
OU_SC: J10
OD_SC: 20/400

## 2022-01-04 ASSESSMENT — TONOMETRY
OD_IOP_MMHG: 11
OS_IOP_MMHG: 13

## 2022-02-03 ENCOUNTER — FOLLOW UP (OUTPATIENT)
Dept: URBAN - METROPOLITAN AREA CLINIC 6 | Facility: CLINIC | Age: 82
End: 2022-02-03

## 2022-02-03 DIAGNOSIS — Z96.1: ICD-10-CM

## 2022-02-03 DIAGNOSIS — H35.3231: ICD-10-CM

## 2022-02-03 PROCEDURE — 99024 POSTOP FOLLOW-UP VISIT: CPT

## 2022-02-03 ASSESSMENT — VISUAL ACUITY
OU_SC: J3-1
OD_SC: 20/40+2
OS_PH: 20/100
OS_SC: 20/400

## 2022-02-03 ASSESSMENT — TONOMETRY
OS_IOP_MMHG: 11
OD_IOP_MMHG: 18

## 2022-02-10 ENCOUNTER — RX CHECK (OUTPATIENT)
Dept: URBAN - METROPOLITAN AREA CLINIC 6 | Facility: CLINIC | Age: 82
End: 2022-02-10

## 2022-02-10 DIAGNOSIS — H52.4: ICD-10-CM

## 2022-02-10 DIAGNOSIS — H52.203: ICD-10-CM

## 2022-02-10 DIAGNOSIS — H52.12: ICD-10-CM

## 2022-02-10 DIAGNOSIS — H52.01: ICD-10-CM

## 2022-02-10 PROCEDURE — 92015 DETERMINE REFRACTIVE STATE: CPT

## 2022-06-02 ENCOUNTER — FOLLOW UP (OUTPATIENT)
Dept: URBAN - METROPOLITAN AREA CLINIC 6 | Facility: CLINIC | Age: 82
End: 2022-06-02

## 2022-06-02 DIAGNOSIS — Z96.1: ICD-10-CM

## 2022-06-02 DIAGNOSIS — H35.3231: ICD-10-CM

## 2022-06-02 PROCEDURE — 92012 INTRM OPH EXAM EST PATIENT: CPT

## 2022-06-02 ASSESSMENT — VISUAL ACUITY
OU_CC: J3-1
OD_CC: 20/40-1
OS_CC: 20/70-1

## 2022-06-02 ASSESSMENT — TONOMETRY
OS_IOP_MMHG: 13
OD_IOP_MMHG: 19

## 2022-06-30 ENCOUNTER — HOSPITAL ENCOUNTER (OUTPATIENT)
Dept: MAMMOGRAPHY | Facility: CLINIC | Age: 82
Discharge: HOME | End: 2022-06-30
Payer: COMMERCIAL

## 2022-06-30 VITALS — HEIGHT: 62 IN | BODY MASS INDEX: 32.42 KG/M2 | WEIGHT: 176.15 LBS

## 2022-06-30 DIAGNOSIS — Z85.3 HX OF BREAST CANCER: ICD-10-CM

## 2022-06-30 DIAGNOSIS — Z12.31 ENCOUNTER FOR SCREENING MAMMOGRAM FOR MALIGNANT NEOPLASM OF BREAST: ICD-10-CM

## 2022-06-30 PROCEDURE — 77065 DX MAMMO INCL CAD UNI: CPT

## 2022-06-30 PROCEDURE — G0279 TOMOSYNTHESIS, MAMMO: HCPCS

## 2022-12-23 NOTE — ASSESSMENT & PLAN NOTE
Phone call to patient left message requesting call back  · Refused CPAP  Previously stopped due to eye swelling from the mask

## 2023-01-03 ENCOUNTER — 6 MONTH FOLLOW UP (OUTPATIENT)
Dept: URBAN - METROPOLITAN AREA CLINIC 6 | Facility: CLINIC | Age: 83
End: 2023-01-03

## 2023-01-03 DIAGNOSIS — Z96.1: ICD-10-CM

## 2023-01-03 DIAGNOSIS — H35.3231: ICD-10-CM

## 2023-01-03 PROCEDURE — 92134 CPTRZ OPH DX IMG PST SGM RTA: CPT

## 2023-01-03 PROCEDURE — 92014 COMPRE OPH EXAM EST PT 1/>: CPT

## 2023-01-03 ASSESSMENT — VISUAL ACUITY
OS_CC: 20/100
OD_CC: 20/80

## 2023-01-03 ASSESSMENT — TONOMETRY
OS_IOP_MMHG: 19
OD_IOP_MMHG: 14

## 2023-02-08 ENCOUNTER — HOSPITAL ENCOUNTER (OUTPATIENT)
Dept: RADIOLOGY | Age: 83
Discharge: HOME | End: 2023-02-08

## 2023-02-08 DIAGNOSIS — R55 SYNCOPE AND COLLAPSE: ICD-10-CM

## 2023-10-09 ENCOUNTER — HOSPITAL ENCOUNTER (OUTPATIENT)
Dept: RADIOLOGY | Age: 83
Discharge: HOME | End: 2023-10-09
Payer: COMMERCIAL

## 2023-10-09 VITALS — WEIGHT: 171 LBS | HEIGHT: 62 IN | BODY MASS INDEX: 31.47 KG/M2

## 2023-10-09 DIAGNOSIS — Z12.31 ENCOUNTER FOR SCREENING MAMMOGRAM FOR MALIGNANT NEOPLASM OF BREAST: ICD-10-CM

## 2023-10-09 PROCEDURE — 77067 SCR MAMMO BI INCL CAD: CPT

## 2023-10-09 PROCEDURE — 77063 BREAST TOMOSYNTHESIS BI: CPT

## 2024-01-26 ENCOUNTER — ESTABLISHED COMPREHENSIVE EXAM (OUTPATIENT)
Dept: URBAN - METROPOLITAN AREA CLINIC 6 | Facility: CLINIC | Age: 84
End: 2024-01-26

## 2024-01-26 DIAGNOSIS — H35.3231: ICD-10-CM

## 2024-01-26 DIAGNOSIS — H02.836: ICD-10-CM

## 2024-01-26 DIAGNOSIS — Z96.1: ICD-10-CM

## 2024-01-26 DIAGNOSIS — H11.32: ICD-10-CM

## 2024-01-26 DIAGNOSIS — H02.9: ICD-10-CM

## 2024-01-26 PROCEDURE — 92014 COMPRE OPH EXAM EST PT 1/>: CPT

## 2024-01-26 ASSESSMENT — VISUAL ACUITY
OU_CC: J16
OD_CC: 20/50
OS_CC: 20/400

## 2024-01-26 ASSESSMENT — TONOMETRY
OD_IOP_MMHG: 13
OS_IOP_MMHG: 11

## 2024-04-08 ENCOUNTER — APPOINTMENT (OUTPATIENT)
Dept: VASCULAR ULTRASOUND | Facility: HOSPITAL | Age: 84
DRG: 603 | End: 2024-04-08
Payer: COMMERCIAL

## 2024-04-08 ENCOUNTER — HOSPITAL ENCOUNTER (INPATIENT)
Facility: HOSPITAL | Age: 84
LOS: 1 days | Discharge: NURSING, REHAB OR LONG-TERM CARE | DRG: 603 | End: 2024-04-11
Attending: EMERGENCY MEDICINE | Admitting: INTERNAL MEDICINE
Payer: COMMERCIAL

## 2024-04-08 ENCOUNTER — APPOINTMENT (EMERGENCY)
Dept: RADIOLOGY | Facility: HOSPITAL | Age: 84
DRG: 603 | End: 2024-04-08
Payer: COMMERCIAL

## 2024-04-08 DIAGNOSIS — L03.90 CELLULITIS: Primary | ICD-10-CM

## 2024-04-08 DIAGNOSIS — G47.33 OSA (OBSTRUCTIVE SLEEP APNEA): ICD-10-CM

## 2024-04-08 PROBLEM — N18.2 CKD (CHRONIC KIDNEY DISEASE) STAGE 2, GFR 60-89 ML/MIN: Status: ACTIVE | Noted: 2021-06-16

## 2024-04-08 PROBLEM — E87.1 HYPONATREMIA: Status: ACTIVE | Noted: 2024-04-08

## 2024-04-08 LAB
ALBUMIN SERPL BCP-MCNC: 3.8 G/DL (ref 3.5–5)
ALP SERPL-CCNC: 83 U/L (ref 34–104)
ALT SERPL W P-5'-P-CCNC: 10 U/L (ref 7–52)
ANION GAP SERPL CALCULATED.3IONS-SCNC: 4 MMOL/L (ref 4–13)
ANION GAP SERPL CALCULATED.3IONS-SCNC: 6 MMOL/L (ref 4–13)
APTT PPP: 35 SECONDS (ref 23–37)
AST SERPL W P-5'-P-CCNC: 22 U/L (ref 13–39)
ATRIAL RATE: 87 BPM
BASOPHILS # BLD AUTO: 0.01 THOUSANDS/ÂΜL (ref 0–0.1)
BASOPHILS # BLD AUTO: 0.02 THOUSANDS/ÂΜL (ref 0–0.1)
BASOPHILS NFR BLD AUTO: 0 % (ref 0–1)
BASOPHILS NFR BLD AUTO: 0 % (ref 0–1)
BILIRUB SERPL-MCNC: 0.6 MG/DL (ref 0.2–1)
BILIRUB UR QL STRIP: NEGATIVE
BNP SERPL-MCNC: 35 PG/ML (ref 0–100)
BUN SERPL-MCNC: 16 MG/DL (ref 5–25)
BUN SERPL-MCNC: 17 MG/DL (ref 5–25)
CALCIUM SERPL-MCNC: 8.3 MG/DL (ref 8.4–10.2)
CALCIUM SERPL-MCNC: 9.1 MG/DL (ref 8.4–10.2)
CHLORIDE SERPL-SCNC: 102 MMOL/L (ref 96–108)
CHLORIDE SERPL-SCNC: 98 MMOL/L (ref 96–108)
CK SERPL-CCNC: 57 U/L (ref 26–192)
CLARITY UR: CLEAR
CO2 SERPL-SCNC: 29 MMOL/L (ref 21–32)
CO2 SERPL-SCNC: 30 MMOL/L (ref 21–32)
COLOR UR: COLORLESS
CREAT SERPL-MCNC: 0.68 MG/DL (ref 0.6–1.3)
CREAT SERPL-MCNC: 0.78 MG/DL (ref 0.6–1.3)
CRP SERPL QL: 10 MG/L
EOSINOPHIL # BLD AUTO: 0.14 THOUSAND/ÂΜL (ref 0–0.61)
EOSINOPHIL # BLD AUTO: 0.18 THOUSAND/ÂΜL (ref 0–0.61)
EOSINOPHIL NFR BLD AUTO: 2 % (ref 0–6)
EOSINOPHIL NFR BLD AUTO: 2 % (ref 0–6)
ERYTHROCYTE [DISTWIDTH] IN BLOOD BY AUTOMATED COUNT: 15.2 % (ref 11.6–15.1)
ERYTHROCYTE [DISTWIDTH] IN BLOOD BY AUTOMATED COUNT: 15.3 % (ref 11.6–15.1)
GFR SERPL CREATININE-BSD FRML MDRD: 69 ML/MIN/1.73SQ M
GFR SERPL CREATININE-BSD FRML MDRD: 80 ML/MIN/1.73SQ M
GLUCOSE P FAST SERPL-MCNC: 99 MG/DL (ref 65–99)
GLUCOSE SERPL-MCNC: 98 MG/DL (ref 65–140)
GLUCOSE SERPL-MCNC: 99 MG/DL (ref 65–140)
GLUCOSE UR STRIP-MCNC: NEGATIVE MG/DL
HCT VFR BLD AUTO: 35.8 % (ref 34.8–46.1)
HCT VFR BLD AUTO: 39.1 % (ref 34.8–46.1)
HGB BLD-MCNC: 11 G/DL (ref 11.5–15.4)
HGB BLD-MCNC: 12.1 G/DL (ref 11.5–15.4)
HGB UR QL STRIP.AUTO: NEGATIVE
IMM GRANULOCYTES # BLD AUTO: 0.02 THOUSAND/UL (ref 0–0.2)
IMM GRANULOCYTES # BLD AUTO: 0.05 THOUSAND/UL (ref 0–0.2)
IMM GRANULOCYTES NFR BLD AUTO: 0 % (ref 0–2)
IMM GRANULOCYTES NFR BLD AUTO: 1 % (ref 0–2)
INR PPP: 0.92 (ref 0.84–1.19)
KETONES UR STRIP-MCNC: NEGATIVE MG/DL
LACTATE SERPL-SCNC: 1.2 MMOL/L (ref 0.5–2)
LEUKOCYTE ESTERASE UR QL STRIP: NEGATIVE
LYMPHOCYTES # BLD AUTO: 0.76 THOUSANDS/ÂΜL (ref 0.6–4.47)
LYMPHOCYTES # BLD AUTO: 0.92 THOUSANDS/ÂΜL (ref 0.6–4.47)
LYMPHOCYTES NFR BLD AUTO: 10 % (ref 14–44)
LYMPHOCYTES NFR BLD AUTO: 11 % (ref 14–44)
MCH RBC QN AUTO: 28.2 PG (ref 26.8–34.3)
MCH RBC QN AUTO: 28.5 PG (ref 26.8–34.3)
MCHC RBC AUTO-ENTMCNC: 30.7 G/DL (ref 31.4–37.4)
MCHC RBC AUTO-ENTMCNC: 30.9 G/DL (ref 31.4–37.4)
MCV RBC AUTO: 92 FL (ref 82–98)
MCV RBC AUTO: 92 FL (ref 82–98)
MONOCYTES # BLD AUTO: 1.15 THOUSAND/ÂΜL (ref 0.17–1.22)
MONOCYTES # BLD AUTO: 1.24 THOUSAND/ÂΜL (ref 0.17–1.22)
MONOCYTES NFR BLD AUTO: 14 % (ref 4–12)
MONOCYTES NFR BLD AUTO: 15 % (ref 4–12)
NEUTROPHILS # BLD AUTO: 5.84 THOUSANDS/ÂΜL (ref 1.85–7.62)
NEUTROPHILS # BLD AUTO: 6.25 THOUSANDS/ÂΜL (ref 1.85–7.62)
NEUTS SEG NFR BLD AUTO: 72 % (ref 43–75)
NEUTS SEG NFR BLD AUTO: 73 % (ref 43–75)
NITRITE UR QL STRIP: NEGATIVE
NRBC BLD AUTO-RTO: 0 /100 WBCS
NRBC BLD AUTO-RTO: 0 /100 WBCS
P AXIS: 49 DEGREES
PH UR STRIP.AUTO: 7 [PH]
PLATELET # BLD AUTO: 132 THOUSANDS/UL (ref 149–390)
PLATELET # BLD AUTO: 150 THOUSANDS/UL (ref 149–390)
PMV BLD AUTO: 11.6 FL (ref 8.9–12.7)
PMV BLD AUTO: 11.7 FL (ref 8.9–12.7)
POTASSIUM SERPL-SCNC: 3.9 MMOL/L (ref 3.5–5.3)
POTASSIUM SERPL-SCNC: 4.4 MMOL/L (ref 3.5–5.3)
PR INTERVAL: 170 MS
PROCALCITONIN SERPL-MCNC: <0.05 NG/ML
PROT SERPL-MCNC: 7.1 G/DL (ref 6.4–8.4)
PROT UR STRIP-MCNC: NEGATIVE MG/DL
PROTHROMBIN TIME: 13 SECONDS (ref 11.6–14.5)
QRS AXIS: -43 DEGREES
QRSD INTERVAL: 124 MS
QT INTERVAL: 384 MS
QTC INTERVAL: 462 MS
RBC # BLD AUTO: 3.9 MILLION/UL (ref 3.81–5.12)
RBC # BLD AUTO: 4.25 MILLION/UL (ref 3.81–5.12)
SODIUM SERPL-SCNC: 133 MMOL/L (ref 135–147)
SODIUM SERPL-SCNC: 136 MMOL/L (ref 135–147)
SP GR UR STRIP.AUTO: 1 (ref 1–1.03)
T WAVE AXIS: -11 DEGREES
UROBILINOGEN UR STRIP-ACNC: <2 MG/DL
VENTRICULAR RATE: 87 BPM
WBC # BLD AUTO: 7.93 THOUSAND/UL (ref 4.31–10.16)
WBC # BLD AUTO: 8.65 THOUSAND/UL (ref 4.31–10.16)

## 2024-04-08 PROCEDURE — 93970 EXTREMITY STUDY: CPT

## 2024-04-08 PROCEDURE — 85610 PROTHROMBIN TIME: CPT | Performed by: EMERGENCY MEDICINE

## 2024-04-08 PROCEDURE — 87070 CULTURE OTHR SPECIMN AEROBIC: CPT

## 2024-04-08 PROCEDURE — 87186 SC STD MICRODIL/AGAR DIL: CPT

## 2024-04-08 PROCEDURE — 90715 TDAP VACCINE 7 YRS/> IM: CPT | Performed by: EMERGENCY MEDICINE

## 2024-04-08 PROCEDURE — 87081 CULTURE SCREEN ONLY: CPT | Performed by: STUDENT IN AN ORGANIZED HEALTH CARE EDUCATION/TRAINING PROGRAM

## 2024-04-08 PROCEDURE — 81003 URINALYSIS AUTO W/O SCOPE: CPT | Performed by: EMERGENCY MEDICINE

## 2024-04-08 PROCEDURE — 85025 COMPLETE CBC W/AUTO DIFF WBC: CPT | Performed by: STUDENT IN AN ORGANIZED HEALTH CARE EDUCATION/TRAINING PROGRAM

## 2024-04-08 PROCEDURE — 85025 COMPLETE CBC W/AUTO DIFF WBC: CPT | Performed by: EMERGENCY MEDICINE

## 2024-04-08 PROCEDURE — 85730 THROMBOPLASTIN TIME PARTIAL: CPT | Performed by: EMERGENCY MEDICINE

## 2024-04-08 PROCEDURE — 83880 ASSAY OF NATRIURETIC PEPTIDE: CPT | Performed by: EMERGENCY MEDICINE

## 2024-04-08 PROCEDURE — 99222 1ST HOSP IP/OBS MODERATE 55: CPT | Performed by: INTERNAL MEDICINE

## 2024-04-08 PROCEDURE — 84145 PROCALCITONIN (PCT): CPT | Performed by: EMERGENCY MEDICINE

## 2024-04-08 PROCEDURE — 36415 COLL VENOUS BLD VENIPUNCTURE: CPT | Performed by: EMERGENCY MEDICINE

## 2024-04-08 PROCEDURE — 86140 C-REACTIVE PROTEIN: CPT | Performed by: STUDENT IN AN ORGANIZED HEALTH CARE EDUCATION/TRAINING PROGRAM

## 2024-04-08 PROCEDURE — 87147 CULTURE TYPE IMMUNOLOGIC: CPT

## 2024-04-08 PROCEDURE — 87205 SMEAR GRAM STAIN: CPT

## 2024-04-08 PROCEDURE — 71045 X-RAY EXAM CHEST 1 VIEW: CPT

## 2024-04-08 PROCEDURE — 73630 X-RAY EXAM OF FOOT: CPT

## 2024-04-08 PROCEDURE — 87040 BLOOD CULTURE FOR BACTERIA: CPT | Performed by: EMERGENCY MEDICINE

## 2024-04-08 PROCEDURE — 83605 ASSAY OF LACTIC ACID: CPT | Performed by: EMERGENCY MEDICINE

## 2024-04-08 PROCEDURE — 93010 ELECTROCARDIOGRAM REPORT: CPT | Performed by: INTERNAL MEDICINE

## 2024-04-08 PROCEDURE — 80053 COMPREHEN METABOLIC PANEL: CPT | Performed by: EMERGENCY MEDICINE

## 2024-04-08 PROCEDURE — 82550 ASSAY OF CK (CPK): CPT | Performed by: EMERGENCY MEDICINE

## 2024-04-08 PROCEDURE — 80048 BASIC METABOLIC PNL TOTAL CA: CPT | Performed by: STUDENT IN AN ORGANIZED HEALTH CARE EDUCATION/TRAINING PROGRAM

## 2024-04-08 PROCEDURE — 93005 ELECTROCARDIOGRAM TRACING: CPT

## 2024-04-08 PROCEDURE — 93970 EXTREMITY STUDY: CPT | Performed by: SURGERY

## 2024-04-08 RX ORDER — CEFAZOLIN SODIUM 2 G/50ML
2000 SOLUTION INTRAVENOUS EVERY 8 HOURS
Status: DISCONTINUED | OUTPATIENT
Start: 2024-04-08 | End: 2024-04-11 | Stop reason: HOSPADM

## 2024-04-08 RX ORDER — NYSTATIN 100000 [USP'U]/G
1 POWDER TOPICAL 4 TIMES DAILY
COMMUNITY

## 2024-04-08 RX ORDER — ENOXAPARIN SODIUM 100 MG/ML
40 INJECTION SUBCUTANEOUS DAILY
Status: DISCONTINUED | OUTPATIENT
Start: 2024-04-08 | End: 2024-04-11 | Stop reason: HOSPADM

## 2024-04-08 RX ORDER — ANASTROZOLE 1 MG/1
1 TABLET ORAL DAILY
Status: DISCONTINUED | OUTPATIENT
Start: 2024-04-08 | End: 2024-04-08

## 2024-04-08 RX ORDER — DOCUSATE SODIUM 100 MG/1
100 CAPSULE, LIQUID FILLED ORAL 2 TIMES DAILY PRN
COMMUNITY

## 2024-04-08 RX ORDER — LORATADINE 10 MG/1
10 TABLET ORAL DAILY
COMMUNITY

## 2024-04-08 RX ORDER — ANTIOX #8/OM3/DHA/EPA/LUT/ZEAX 250-2.5 MG
1 CAPSULE ORAL 2 TIMES DAILY
COMMUNITY

## 2024-04-08 RX ORDER — ASPIRIN 81 MG/1
81 TABLET, CHEWABLE ORAL DAILY
Status: DISCONTINUED | OUTPATIENT
Start: 2024-04-08 | End: 2024-04-11 | Stop reason: HOSPADM

## 2024-04-08 RX ORDER — ECHINACEA PURPUREA EXTRACT 125 MG
2 TABLET ORAL AS NEEDED
COMMUNITY

## 2024-04-08 RX ORDER — POTASSIUM CHLORIDE 750 MG/1
20 CAPSULE, EXTENDED RELEASE ORAL 2 TIMES DAILY
COMMUNITY

## 2024-04-08 RX ORDER — FLUTICASONE PROPIONATE 50 MCG
1 SPRAY, SUSPENSION (ML) NASAL
COMMUNITY

## 2024-04-08 RX ORDER — ACETAMINOPHEN 325 MG/1
650 TABLET ORAL EVERY 6 HOURS PRN
Status: DISCONTINUED | OUTPATIENT
Start: 2024-04-08 | End: 2024-04-11 | Stop reason: HOSPADM

## 2024-04-08 RX ORDER — ATORVASTATIN CALCIUM 20 MG/1
20 TABLET, FILM COATED ORAL
Status: DISCONTINUED | OUTPATIENT
Start: 2024-04-08 | End: 2024-04-11 | Stop reason: HOSPADM

## 2024-04-08 RX ORDER — VANCOMYCIN HYDROCHLORIDE 500 MG/100ML
500 INJECTION, SOLUTION INTRAVENOUS ONCE
Status: COMPLETED | OUTPATIENT
Start: 2024-04-08 | End: 2024-04-08

## 2024-04-08 RX ADMIN — ENOXAPARIN SODIUM 40 MG: 40 INJECTION SUBCUTANEOUS at 08:28

## 2024-04-08 RX ADMIN — VANCOMYCIN HYDROCHLORIDE 500 MG: 500 INJECTION, SOLUTION INTRAVENOUS at 08:28

## 2024-04-08 RX ADMIN — CEFEPIME 1000 MG: 1 INJECTION, POWDER, FOR SOLUTION INTRAMUSCULAR; INTRAVENOUS at 11:26

## 2024-04-08 RX ADMIN — ATORVASTATIN CALCIUM 20 MG: 40 TABLET, FILM COATED ORAL at 16:45

## 2024-04-08 RX ADMIN — TETANUS TOXOID, REDUCED DIPHTHERIA TOXOID AND ACELLULAR PERTUSSIS VACCINE, ADSORBED 0.5 ML: 5; 2.5; 8; 8; 2.5 SUSPENSION INTRAMUSCULAR at 02:34

## 2024-04-08 RX ADMIN — CEFAZOLIN SODIUM 2000 MG: 2 SOLUTION INTRAVENOUS at 19:37

## 2024-04-08 RX ADMIN — MULTIPLE VITAMINS W/ MINERALS TAB 1 TABLET: TAB ORAL at 08:34

## 2024-04-08 RX ADMIN — FUROSEMIDE 60 MG: 20 TABLET ORAL at 08:28

## 2024-04-08 RX ADMIN — ASPIRIN 81 MG CHEWABLE TABLET 81 MG: 81 TABLET CHEWABLE at 08:28

## 2024-04-08 RX ADMIN — CEFEPIME 2000 MG: 2 INJECTION, POWDER, FOR SOLUTION INTRAVENOUS at 03:46

## 2024-04-08 RX ADMIN — METOPROLOL TARTRATE 12.5 MG: 25 TABLET, FILM COATED ORAL at 08:29

## 2024-04-08 RX ADMIN — SODIUM CHLORIDE 500 ML: 0.9 INJECTION, SOLUTION INTRAVENOUS at 03:48

## 2024-04-08 RX ADMIN — VANCOMYCIN HYDROCHLORIDE 1500 MG: 5 INJECTION, POWDER, LYOPHILIZED, FOR SOLUTION INTRAVENOUS at 04:40

## 2024-04-08 NOTE — H&P
[facility]  H&P  Name: Elda Maxwell 83 y.o. female I MRN: [id number]  Unit/Bed#: ED-37 I Date of Admission: 4/8/2024   Date of Service: 4/8/2024 I Hospital Day: 0      Assessment/Plan   * Cellulitis  Assessment & Plan  POA from [facility] with extensive lower extremities erythema (see imaging in media)  H/o of chronic leg swelling due to lymphedema   Hemodynamically stable, not meeting SIRS criteria  Blood work gossly unremarkable: No WBC/Lactic/proCal, Na+ 133. BNP, ck -normal  Xray right foot: no fx, no obvious bone involvement on my read, follow official report  IN  cc bolus, Cefepime, Vanco, tetanus diphtheira   Pt is admitted for observation and IV ABx    Plan:  Monitor vitals and fever curve  Follow am cbc  Follow blood culture  Follow MRSA culture  Follow CRP  C/w with broad spectrum Abx, narrow if bx is negative  Follow LE duplex US  Podiatry consult  Compression stalking  Restart home diuretics, consider additional lasix dose if no improvement            Hyponatremia  Assessment & Plan  POA with Na+ 133  Follow am BMP      CKD (chronic kidney disease) stage 2, GFR 60-89 ml/min  Assessment & Plan  Lab Results   Component Value Date    EGFR 69 04/08/2024    EGFR 73 10/05/2023    EGFR 81 03/21/2023    CREATININE 0.78 04/08/2024    CREATININE 0.79 10/05/2023    CREATININE 0.73 03/21/2023   Avoid hypotension and nephrotoxic medications    Leg swelling  Assessment & Plan  Chronic lymphedema  Proceed with compressing stalking or ace wrapping if can't tolerate    Essential hypertension  Assessment & Plan  C/w Furosemide 60 mg BID  C/w Lopressor 12.5 mg daily?    Obesity (BMI 30-39.9)  Assessment & Plan  Low calories low sugar diet  Encourage lifestyle modification on discharge    DAVE (obstructive sleep apnea)  Assessment & Plan  Pt states that she is unable to tolerate CPAP and refusing  Pt states that she is falling asleep during the day multiple times            VTE Pharmacologic Prophylaxis: VTE Score: 5   Code Status: Level 3 - DNAR and DNI   Discussion with family: Patient declined call to .  Due to early time.     Anticipated Length of Stay: Patient will be admitted on an inpatient basis with an anticipated length of stay of greater than 2 midnights secondary to cellulitis.    Chief Complaint: leg/feet swelling and redness    History of Present Illness:  Elda Maxwell is a 83 y.o. female with a PMH of HTN, HLD, CKD, chronic lymphedema who presents rom [facility] with LE swelling. Pt states that she has leg swelling below her knees chronically but about 3 days ago it worsened and she develop right foot redness and swelling as well. She states that she is following with podiatry doctor in the nursing home regularly. However this swelling and redness is new. She admits tenderness and pain with ambulating as well. Pt denies any skin lesions/wounds or discharge. Pt denies any fever, chills, chest pain, sob, palpitations, nausea, vomiting, diarrhea, constipation, dysuria.    Pt is hemodynamically stable on admission, slight elevation of BP to 162/77 which improved to 141/70 without intervention. Blood work grossly unremarkable, foot xray no fx or bone involvement on my read. Significant readness and swelling in the right foot with demarcating line.    [facility].    Off Note: Please call [facility] for [facility] rec confirmation. Attempted to call with no answer. No recent encounter in the chart. Anastarazole wasn't restarted, awaiting clarification.      Review of Systems:  Review of Systems   Constitutional:  Negative for chills and fever.   HENT:  Negative for ear pain and sore throat.    Eyes:  Negative for pain and visual disturbance.   Respiratory:  Negative for cough and shortness of breath.    Cardiovascular:  Negative for chest pain and palpitations.   Gastrointestinal:  Negative  for abdominal pain, constipation, diarrhea, nausea and vomiting.   Genitourinary:  Negative for dysuria and hematuria.   Musculoskeletal:  Negative for arthralgias and back pain.   Skin:  Positive for color change. Negative for rash.   Neurological:  Negative for seizures and syncope.   All other systems reviewed and are negative.      Past Medical and Surgical History:       Past Surgical History:   Procedure Laterality Date    APPENDECTOMY      BREAST BIOPSY  12/28/2015    CHOLECYSTECTOMY  2009    COLONOSCOPY  10/30/2014    HERNIA REPAIR Right     MAMMO NEEDLE LOCALIZATION RIGHT (ALL INC) Right 1/10/2013    MASTECTOMY Right 2013    MASTECTOMY Right 02/01/2013    TONSILLECTOMY      TONSILLECTOMY      US BREAST NEEDLE LOC LEFT Left 12/28/2015       Meds/Allergies:  Prior to Admission medications    Medication Sig Start Date End Date Taking? Authorizing Provider   acetaminophen (TYLENOL) 500 mg tablet Take 500 mg by mouth every 6 (six) hours as needed for mild pain    Historical Provider, MD   anastrozole (ARIMIDEX) 1 mg tablet Take 1 mg by mouth daily 2/14/18   Historical Provider, MD   aspirin 81 MG tablet Take by mouth    Historical Provider, MD   atorvastatin (LIPITOR) 20 mg tablet Take by mouth    Historical Provider, MD   Calcium Carbonate-Vitamin D (CALCIUM 500 + D) 500-125 MG-UNIT TABS Take by mouth    Historical Provider, MD   calcium carbonate-vitamin D (OSCAL-D) 500 mg-200 units per tablet Take 1 tablet by mouth daily with breakfast    Historical Provider, MD   Cholecalciferol (VITAMIN D3) 2000 units TABS Take by mouth    Historical Provider, MD   cyanocobalamin (VITAMIN B-12) 500 mcg tablet Take by mouth    Historical Provider, MD   furosemide (LASIX) 20 mg tablet Take 3 tablets (60 mg total) by mouth 2 (two) times a day 6/21/21 7/21/21  Curry Chaudhry,    ketoconazole (NIZORAL) 2 % cream Apply topically daily    Historical Provider, MD   metoprolol tartrate (LOPRESSOR) 25 mg tablet Take 0.5 tablets  "(12.5 mg total) by mouth daily 6/22/21 7/22/21  Curry Chaudhry, DO   Multiple Vitamin (MULTIVITAMIN) tablet Take 1 tablet by mouth daily    Historical Provider, MD     I have been unable to obtain / verify an up to date medication list despite all reasonable attempts.    Allergies: No Known Allergies    Social History:  Marital Status: Single   Occupation: retired  Patient Pre-hospital Living Situation: Skilled Nursing Facility: [facility]  Patient Pre-hospital Level of Mobility: walks with walker  Patient Pre-hospital Diet Restrictions: none  Substance Use History:   Social History     Substance and Sexual Activity   Alcohol Use No     Social History     Tobacco Use   Smoking Status Former   Smokeless Tobacco Never   Tobacco Comments    hasn't smoked since highschool age     Social History     Substance and Sexual Activity   Drug Use No       Family History:  Family History   Problem Relation Age of Onset    Stomach cancer Mother     Heart disease Father     Lung cancer Brother     Alzheimer's disease Brother     Parkinsonism Brother     Hypertension Brother     Heart disease Brother     Heart disease Other        Physical Exam:     Vitals:   Blood Pressure: 122/60 (04/08/24 0530)  Pulse: 86 (04/08/24 0530)  Temperature: 98.2 °F (36.8 °C) (04/08/24 0148)  Temp Source: Oral (04/08/24 0148)  Respirations: 18 (04/08/24 0530)  Height: 5' 2\" (157.5 cm) (04/08/24 0148)  Weight - Scale: 81.5 kg (179 lb 10.8 oz) (04/08/24 0148)  SpO2: 90 % (04/08/24 0530)    Physical Exam  Vitals and nursing note reviewed.   Constitutional:       General: She is not in acute distress.     Appearance: She is well-developed.   HENT:      Head: Normocephalic and atraumatic.   Eyes:      Conjunctiva/sclera: Conjunctivae normal.      Comments: Mild eye lid swelling, pt states that it's chronic   Cardiovascular:      Rate and Rhythm: Normal rate and regular rhythm.      Heart sounds: No murmur heard.  Pulmonary:      Effort: Pulmonary effort " is normal. No respiratory distress.      Breath sounds: Normal breath sounds.   Abdominal:      Palpations: Abdomen is soft.      Tenderness: There is no abdominal tenderness.   Musculoskeletal:         General: Swelling present.      Cervical back: Neck supple.      Right lower leg: Edema present.      Left lower leg: Edema present.   Skin:     General: Skin is warm and dry.      Capillary Refill: Capillary refill takes less than 2 seconds.      Findings: Erythema present.   Neurological:      General: No focal deficit present.      Mental Status: She is alert and oriented to person, place, and time.   Psychiatric:         Mood and Affect: Mood normal.          Additional Data:     Lab Results:  Results from last 7 days   Lab Units 04/08/24  0303   WBC Thousand/uL 8.65   HEMOGLOBIN g/dL 12.1   HEMATOCRIT % 39.1   PLATELETS Thousands/uL 150   NEUTROS PCT % 72   LYMPHS PCT % 11*   MONOS PCT % 14*   EOS PCT % 2     Results from last 7 days   Lab Units 04/08/24  0303   SODIUM mmol/L 133*   POTASSIUM mmol/L 4.4   CHLORIDE mmol/L 98   CO2 mmol/L 29   BUN mg/dL 17   CREATININE mg/dL 0.78   ANION GAP mmol/L 6   CALCIUM mg/dL 9.1   ALBUMIN g/dL 3.8   TOTAL BILIRUBIN mg/dL 0.60   ALK PHOS U/L 83   ALT U/L 10   AST U/L 22   GLUCOSE RANDOM mg/dL 98     Results from last 7 days   Lab Units 04/08/24  0303   INR  0.92             Results from last 7 days   Lab Units 04/08/24  0303   LACTIC ACID mmol/L 1.2   PROCALCITONIN ng/ml <0.05       Lines/Drains:  Invasive Devices       Peripheral Intravenous Line  Duration             Peripheral IV 04/08/24 Left;Ventral (anterior) Forearm <1 day                        Imaging: Reviewed radiology reports from this admission including: chest xray and xray(s)  XR chest portable   ED Interpretation by Phil Douglas MD (04/08 0241)   Poor inspiratory effort, patient is rotated, cardiac border overlies right costophrenic angle however no discernible focal consolidation.  No acute  cardiopulmonary disease.  Independently interpreted by myself.      XR foot 3+ views RIGHT   ED Interpretation by Phil Douglas MD (04/08 0241)   No acute fracture or dislocation. Interpreted independently by myself.      VAS Lower extremity venous insufficiency duplex, bilateral w/ measurements    (Results Pending)       EKG and Other Studies Reviewed on Admission:   EKG: NSR. HR 87.    ** Please Note: This note has been constructed using a voice recognition system. **

## 2024-04-08 NOTE — ASSESSMENT & PLAN NOTE
POA from [facility] with extensive lower extremities erythema (see imaging in media)  H/o of chronic leg swelling due to lymphedema   Hemodynamically stable, not meeting SIRS criteria  Blood work gossly unremarkable  Xray right foot: no fx, no obvious bone involvement   IN  cc bolus, Cefepime, Vanco, tetanus diphtheira     Plan:  Monitor vitals and fever curve  Follow am cbc  Follow blood culture  IV cefazolin  Podiatry consulted, appreciate reccs

## 2024-04-08 NOTE — PLAN OF CARE
Problem: Prexisting or High Potential for Compromised Skin Integrity  Goal: Skin integrity is maintained or improved  Description: INTERVENTIONS:  - Identify patients at risk for skin breakdown  - Assess and monitor skin integrity  - Assess and monitor nutrition and hydration status  - Monitor labs   - Assess for incontinence   - Turn and reposition patient  - Assist with mobility/ambulation  - Relieve pressure over bony prominences  - Avoid friction and shearing  - Provide appropriate hygiene as needed including keeping skin clean and dry  - Evaluate need for skin moisturizer/barrier cream  - Collaborate with interdisciplinary team   - Patient/family teaching  - Consider wound care consult   Outcome: Progressing     Problem: PAIN - ADULT  Goal: Verbalizes/displays adequate comfort level or baseline comfort level  Description: Interventions:  - Encourage patient to monitor pain and request assistance  - Assess pain using appropriate pain scale  - Administer analgesics based on type and severity of pain and evaluate response  - Implement non-pharmacological measures as appropriate and evaluate response  - Consider cultural and social influences on pain and pain management  - Notify physician/advanced practitioner if interventions unsuccessful or patient reports new pain  Outcome: Progressing     Problem: INFECTION - ADULT  Goal: Absence or prevention of progression during hospitalization  Description: INTERVENTIONS:  - Assess and monitor for signs and symptoms of infection  - Monitor lab/diagnostic results  - Monitor all insertion sites, i.e. indwelling lines, tubes, and drains  - Monitor endotracheal if appropriate and nasal secretions for changes in amount and color  - Rew appropriate cooling/warming therapies per order  - Administer medications as ordered  - Instruct and encourage patient and family to use good hand hygiene technique  - Identify and instruct in appropriate isolation precautions for  identified infection/condition  Outcome: Progressing     Problem: DISCHARGE PLANNING  Goal: Discharge to home or other facility with appropriate resources  Description: INTERVENTIONS:  - Identify barriers to discharge w/patient and caregiver  - Arrange for needed discharge resources and transportation as appropriate  - Identify discharge learning needs (meds, wound care, etc.)  - Arrange for interpretive services to assist at discharge as needed  - Refer to Case Management Department for coordinating discharge planning if the patient needs post-hospital services based on physician/advanced practitioner order or complex needs related to functional status, cognitive ability, or social support system  Outcome: Progressing     Problem: Knowledge Deficit  Goal: Patient/family/caregiver demonstrates understanding of disease process, treatment plan, medications, and discharge instructions  Description: Complete learning assessment and assess knowledge base.  Interventions:  - Provide teaching at level of understanding  - Provide teaching via preferred learning methods  Outcome: Progressing

## 2024-04-08 NOTE — ASSESSMENT & PLAN NOTE
POA from [facility] with extensive lower extremities erythema (see imaging in media)  H/o of chronic leg swelling due to lymphedema   Hemodynamically stable, not meeting SIRS criteria  Blood work gossly unremarkable: No WBC/Lactic/proCal, Na+ 133. BNP, ck -normal  Xray right foot: no fx, no obvious bone involvement on my read, follow official report  IN  cc bolus, Cefepime, Vanco, tetanus diphtheira   Pt is admitted for observation and IV ABx    Plan:  Monitor vitals and fever curve  Follow am cbc  Follow blood culture  Follow MRSA culture  Follow CRP  C/w with broad spectrum Abx, narrow if bx is negative  Follow LE duplex US  Podiatry consult  Compression stalking  Restart home diuretics, consider additional lasix dose if no improvement

## 2024-04-08 NOTE — ED ATTENDING ATTESTATION
4/8/2024  I, Donal Cornell MD, saw and evaluated the patient. I have discussed the patient with the resident/non-physician practitioner and agree with the resident's/non-physician practitioner's findings, Plan of Care, and MDM as documented in the resident's/non-physician practitioner's note, except where noted. All available labs and Radiology studies were reviewed.  I was present for key portions of any procedure(s) performed by the resident/non-physician practitioner and I was immediately available to provide assistance.       At this point I agree with the current assessment done in the Emergency Department.  I have conducted an independent evaluation of this patient a history and physical is as follows: Patient is a 83 year old female with increased pain, swelling and redness of R foot for past 3 days. Was sent in from nursing facility [facility] for evaluation. Denies trauma. No fever. Has h/o lymphedema. Last Td was in 2011 as per Epic. Was last seen at  [facility] [place] on 3/18/20 for folliculitis. [employer] website checked on this patient and last Rx filled was on 3/18/24 for flonase for 60 day supply. NCAT. Moist mucous membranes. No scleral icterus. Lungs clear. Heart regular. Abdomen soft and nontender. Good bowel sounds. (+) bilateral LE edema. (+) R dorsal foot swelling with erythema and tenderness including erythema of toes as well. DDX including but not limited to: cellulitis, osteomyelitis, lymphangitis, folliculitis, carbuncle, abscess, rhabdomyolysis, necrotizing fasciitis; doubt allergic reaction, angioedema, DVT.  Will check labs and x-ray and give IV abx and patient will need admission. Tdap to be given as well.     ED Course         Critical Care Time  Procedures

## 2024-04-08 NOTE — ASSESSMENT & PLAN NOTE
Lab Results   Component Value Date    EGFR 69 04/08/2024    EGFR 73 10/05/2023    EGFR 81 03/21/2023    CREATININE 0.78 04/08/2024    CREATININE 0.79 10/05/2023    CREATININE 0.73 03/21/2023   Avoid hypotension and nephrotoxic medications

## 2024-04-08 NOTE — CASE MANAGEMENT
Case Management Assessment & Discharge Planning Note    Patient name Elda Maxwell  Location ED-37/ED-37 MRN [id number]  : 1940 Date 2024       Current Admission Date: 2024  Current Admission Diagnosis:Cellulitis   Patient Active Problem List    Diagnosis Date Noted    Cellulitis 2024    Hyponatremia 2024    CKD (chronic kidney disease) stage 2, GFR 60-89 ml/min 2021    Acute respiratory failure with hypoxia (HCC) 06/15/2021    Leg swelling 06/15/2021    Claustrophobia 2018    Periorbital edema 2018    Dry mouth 2018    DAVE (obstructive sleep apnea) 2018    Hypersomnia 2018    Insufficient sleep syndrome 2018    Obesity (BMI 30-39.9) 2018    Essential hypertension 2018    Localized swelling of both lower legs 2018      LOS (days): 0  Geometric Mean LOS (GMLOS) (days):   Days to GMLOS:     OBJECTIVE:              Current admission status: Observation       Preferred Pharmacy:   [id number] - [place], [place] - [address]  [address] [address]  Phone: [phone] Fax: [phone]    [id number] - [place], [place] - [address]  [address]  [place] [address]  Phone: [phone] Fax: [phone]    [facility] [place] - [address]  [address] [facility] [address] [place] [address]  Phone: [phone] Fax: [phone]    [facility] [place], [place] - [address]  [address] [place] [address]  Phone: [phone] Fax: [phone]    Primary Care Provider: Juliette Moon MD    Primary Insurance: [employer] RE[employer]  Secondary Insurance:     ASSESSMENT:  Active Health Care Proxies    There are no active Health Care Proxies on file.                 Readmission Root Cause  30 Day Readmission: No    Patient Information  Admitted from:: Facility  Mental Status: Alert  During Assessment patient was accompanied by: Not  accompanied during assessment  Assessment information provided by:: Patient  Primary Caregiver: Self  Support Systems: Other (Comment) (Staff at [facility])  Home entry access options. Select all that apply.: No steps to enter home  Type of Current Residence: Facility ([facility])  Upon entering residence, is there a bedroom on the main floor (no further steps)?: Yes  Upon entering residence, is there a bathroom on the main floor (no further steps)?: Yes  Living Arrangements: Other (Comment) ([facility])  Is patient a ?: No    Activities of Daily Living Prior to Admission  Functional Status: Assistance (Facility manages meds, patient dresses self, does have help with bathing)  Completes ADLs independently?: No  Level of ADL dependence: Assistance (Patient independent with most ADLs)  Ambulates independently?: Yes  Does patient use assisted devices?: Yes  Assisted Devices (DME) used: Walker  Does patient currently own DME?: Yes  What DME does the patient currently own?: Walker  Does patient have a history of Outpatient Therapy (PT/OT)?: No  Does the patient have a history of Short-Term Rehab?: Yes  Does patient have a history of HHC?: Yes  Does patient currently have HHC?: No         Patient Information Continued  Income Source: Pension/custodial  Does patient have prescription coverage?: Yes  Does patient receive dialysis treatments?: No  Does patient have a history of substance abuse?: No  Does patient have a history of Mental Health Diagnosis?: No         Means of Transportation  Means of Transport to Appts:: Other (Comment) (Facility transport)      Social Determinants of Health (SDOH)      Flowsheet Row Most Recent Value   Housing Stability    In the last 12 months, was there a time when you were not able to pay the mortgage or rent on time? N   In the last 12 months, how many places have you lived? 1   In the last 12 months, was there a time when you did not have a steady place to  sleep or slept in a shelter (including now)? N   Transportation Needs    In the past 12 months, has lack of transportation kept you from medical appointments or from getting medications? no   In the past 12 months, has lack of transportation kept you from meetings, work, or from getting things needed for daily living? No   Food Insecurity    Within the past 12 months, you worried that your food would run out before you got the money to buy more. Never true   Utilities    In the past 12 months has the electric, gas, oil, or water company threatened to shut off services in your home? No            DISCHARGE DETAILS:    Discharge planning discussed with:: Patient  Freedom of Choice: Yes  Comments - Freedom of Choice: Met with patient at bedside. CM introduced self and CM role. [facility] at [facility] She is independent with most ADLs, uses a walker, no O2, has CPAP but does not use, not current with any HHC. Patient will need transportation back to [facility].  CM contacted family/caregiver?: Yes  Were Treatment Team discharge recommendations reviewed with patient/caregiver?: Yes  Did patient/caregiver verbalize understanding of patient care needs?: Yes  Were patient/caregiver advised of the risks associated with not following Treatment Team discharge recommendations?: Yes    Contacts  Patient Contacts: Anne Marie Galeana  Relationship to Patient:: Family  Contact Method: Phone  Phone Number: [phone]  Reason/Outcome: Emergency Contact              Other Referral/Resources/Interventions Provided:  Referral Comments: Evaluations pending

## 2024-04-08 NOTE — OCCUPATIONAL THERAPY NOTE
Occupational Therapy Cancellation Note        Patient Name: Elda Maxwell  Today's Date: 4/8/2024 04/08/24 0837   Note Type   Note type Cancelled Session   Cancel Reasons Other   Additional Comments OT orders received, chart reviewed. Pt currently pending podiatry consult for R LE wound and official x ray read. Will cancel, appreciate updated WBS and footwear recs as indicated     Leanna Appiah, OT

## 2024-04-08 NOTE — ED NOTES
Pts O2 saturation while sleeping decreased to 84%. This RN placed pt on 2L NC at this time. O2 saturation now at 97% on 2L NC.      Ally Roach RN  04/08/24 0629

## 2024-04-08 NOTE — PROGRESS NOTES
Elda Maxwell is a 83 y.o. female who is currently ordered Vancomycin IV with management by the Pharmacy Consult service.  Relevant clinical data and objective / subjective history reviewed.  Vancomycin Assessment:  Indication and Goal AUC/Trough: Soft tissue (goal -600, trough >10), -600, trough >10  Clinical Status:  New Patient  Micro:   Pending  Renal Function:  SCr: 0.68 mg/dL  CrCl: 51.4 mL/min  Renal replacement: Not on dialysis  Days of Therapy: 1  Current Dose: 1500mg IV once load  Vancomycin Plan:  New Dosinmg IV Catch up load once + 1250mg IV Q24  Estimated AUC: 459 mcg*hr/mL  Estimated Trough: 11.8 mcg/mL  Next Level:  @ 0600  Renal Function Monitoring: Daily BMP and UOP  Pharmacy will continue to follow closely for s/sx of nephrotoxicity, infusion reactions and appropriateness of therapy.  BMP and CBC will be ordered per protocol. We will continue to follow the patient’s culture results and clinical progress daily.    Kim Campa, Pharmacist

## 2024-04-08 NOTE — ED PROVIDER NOTES
"History  Chief Complaint   Patient presents with    Foot Pain     Patient arrived via EMS from [facility] with bilateral feet swelling, pain, and redness.     (Elda Maxwell) Elda Maxwell is a 83 y.o. female     They presented to the emergency department on April 8, 2024. Patient presents with:  Foot Pain: Patient arrived via EMS from [facility] with right foot swelling, pain, and redness.      The patient states that she began noticing worsening swelling of her bilateral feet however notes redness along the top of her right foot.  Patient notes that she has mild pain associated with this and feels as if her right foot is warm.  Patient notes that she does take a \"water pill\" however is unable to recall the name.  Patient is presenting from [facility]. Patient denies fever, chills, chest pain, difficulty breathing, abdominal pain, change in bowel habits, change in urination, or any other complaint at this time.              Prior to Admission Medications   Prescriptions Last Dose Informant Patient Reported? Taking?   Calcium Carbonate-Vitamin D (CALCIUM 500 + D) 500-125 MG-UNIT TABS  Self Yes No   Sig: Take by mouth   Cholecalciferol (VITAMIN D3) 2000 units TABS  Self Yes No   Sig: Take by mouth   Multiple Vitamin (MULTIVITAMIN) tablet  Self Yes No   Sig: Take 1 tablet by mouth daily   acetaminophen (TYLENOL) 500 mg tablet  Self Yes No   Sig: Take 500 mg by mouth every 6 (six) hours as needed for mild pain   anastrozole (ARIMIDEX) 1 mg tablet  Self Yes No   Sig: Take 1 mg by mouth daily   aspirin 81 MG tablet  Self Yes No   Sig: Take by mouth   atorvastatin (LIPITOR) 20 mg tablet  Self Yes No   Sig: Take by mouth   calcium carbonate-vitamin D (OSCAL-D) 500 mg-200 units per tablet  Self Yes No   Sig: Take 1 tablet by mouth daily with breakfast   cyanocobalamin (VITAMIN B-12) 500 mcg tablet  Self Yes No   Sig: Take by mouth   furosemide (LASIX) 20 mg tablet   No No   Sig: Take " 3 tablets (60 mg total) by mouth 2 (two) times a day   ketoconazole (NIZORAL) 2 % cream  Self Yes No   Sig: Apply topically daily   metoprolol tartrate (LOPRESSOR) 25 mg tablet   No No   Sig: Take 0.5 tablets (12.5 mg total) by mouth daily      Facility-Administered Medications: None       Past Medical History:   Diagnosis Date    Acid reflux     Breast cancer (HCC)     Armidex    Breast cancer (HCC)     DVT (deep venous thrombosis) ([facility]) 07/2016    subacute/chronic dvt on the right side in     Edema     chronic leg edema, PSBO 12/31/2015    Heart failure ([facility])     History of echocardiogram 07/01/2016    Transthoracic Echocardiogram 7/1/16: LV normal size left ventricle. no LVH. normal global systolic LV function (EF 61%). no gross regional wall motion abnormality. normal diastolic LV function. RV normal size. no RVH . normal RV function. Left atrium is normal in size. normal appearing atrial septum. Right atrium is normal in size. normal appearing atrial septum. mitral valve has mild thickenin    History of EKG 11/01/2017    *EKG 11/1/17: normal sinus rhythm; RBBB, left anterior fascicular block, bifascicular block; moderate voltage criteria for LVH, may be normal variant, abnormal ECG EKG 5/26/17: normal sinus rhythm, RBBB, left anterior fascicular block, bifascicular block; moderate voltage criteria for LVH, may be normal variant; abnormal ECG. EKG 3/1/17: normal sinus rhythm; RBBB, left anterior fascicular bloc    History of stress test 04/25/2014    Adenosine Cardiolite Stress Test 4/25/14: EKG portion- 1) the stress test was inconclusive for myocardial ischemia due to baseline EKG abnormalities 2) physiologic response to adenosine infusion 3) cardiolite scan is attached. Conclusion: Very mildly abnormal Adenosine Cardiolite SPECT study. there is a small fixed mild defect in the left ventricular apex. there is no reversible ischemia. diffe    Hyperlipidemia     Hypertension     Hypertension     Leg edema      chronic     Lymphedema     Osteopenia     Peripheral vascular disease (HCC)     Scoliosis     Sleep apnea     Thrombocytopenia (HCC)     Vitamin D deficiency        Past Surgical History:   Procedure Laterality Date    APPENDECTOMY      BREAST BIOPSY  12/28/2015    CHOLECYSTECTOMY  2009    COLONOSCOPY  10/30/2014    HERNIA REPAIR Right     MAMMO NEEDLE LOCALIZATION RIGHT (ALL INC) Right 1/10/2013    MASTECTOMY Right 2013    MASTECTOMY Right 02/01/2013    TONSILLECTOMY      TONSILLECTOMY      US BREAST NEEDLE LOC LEFT Left 12/28/2015       Family History   Problem Relation Age of Onset    Stomach cancer Mother     Heart disease Father     Lung cancer Brother     Alzheimer's disease Brother     Parkinsonism Brother     Hypertension Brother     Heart disease Brother     Heart disease Other      I have reviewed and agree with the history as documented.    E-Cigarette/Vaping    E-Cigarette Use Never User      E-Cigarette/Vaping Substances    Nicotine No     THC No     CBD No     Flavoring No     Other No     Unknown No      Social History     Tobacco Use    Smoking status: Former    Smokeless tobacco: Never    Tobacco comments:     hasn't smoked since highschool age   Vaping Use    Vaping status: Never Used   Substance Use Topics    Alcohol use: No    Drug use: No        Review of Systems   Constitutional:  Negative for chills and fever.   HENT:  Negative for ear pain and sore throat.    Eyes:  Negative for pain and visual disturbance.   Respiratory:  Negative for cough and shortness of breath.    Cardiovascular:  Positive for leg swelling. Negative for chest pain and palpitations.   Gastrointestinal:  Negative for abdominal pain and vomiting.   Genitourinary:  Negative for dysuria and hematuria.   Musculoskeletal:  Negative for arthralgias and back pain.   Skin:  Positive for color change (Red right foot). Negative for rash.   Neurological:  Negative for seizures and syncope.   All other systems reviewed and are  negative.      Physical Exam  ED Triage Vitals [04/08/24 0148]   Temperature Pulse Respirations Blood Pressure SpO2   98.2 °F (36.8 °C) 89 18 162/77 92 %      Temp Source Heart Rate Source Patient Position - Orthostatic VS BP Location FiO2 (%)   Oral Monitor -- -- --      Pain Score       --             Orthostatic Vital Signs  Vitals:    04/08/24 0148   BP: 162/77   Pulse: 89       Physical Exam  Vitals and nursing note reviewed.   Constitutional:       General: She is in acute distress.      Appearance: Normal appearance.   HENT:      Head: Normocephalic and atraumatic.      Right Ear: External ear normal.      Left Ear: External ear normal.      Nose: Nose normal.      Mouth/Throat:      Mouth: Mucous membranes are moist.   Eyes:      Conjunctiva/sclera: Conjunctivae normal.   Cardiovascular:      Rate and Rhythm: Normal rate and regular rhythm.   Pulmonary:      Effort: Pulmonary effort is normal. No respiratory distress.      Breath sounds: Normal breath sounds.   Abdominal:      General: Abdomen is flat. Bowel sounds are normal.      Tenderness: There is no abdominal tenderness. There is no guarding or rebound.   Musculoskeletal:         General: Normal range of motion.      Cervical back: Normal range of motion.      Right lower leg: Edema (2+) present.      Left lower leg: Edema (2+) present.   Skin:     General: Skin is warm and dry.      Capillary Refill: Capillary refill takes less than 2 seconds.      Findings: Erythema (Erythema and warmth overlying the dorsal aspect of the right foot does not extend past the right ankle, minimally tender to palpation) present.   Neurological:      Mental Status: She is alert. Mental status is at baseline.   Psychiatric:         Mood and Affect: Mood normal.         ED Medications  Medications   vancomycin (VANCOCIN) 1500 mg in sodium chloride 0.9% 250 mL IVPB (has no administration in time range)   cefepime (MAXIPIME) 2 g/50 mL dextrose IVPB (2,000 mg Intravenous New  Bag 4/8/24 3396)   sodium chloride 0.9 % bolus 500 mL (500 mL Intravenous New Bag 4/8/24 6028)   tetanus-diphtheria-acellular pertussis (BOOSTRIX) IM injection 0.5 mL (0.5 mL Intramuscular Given 4/8/24 6584)       Diagnostic Studies  Results Reviewed       Procedure Component Value Units Date/Time    Procalcitonin [[id number]]  (Normal) Collected: 04/08/24 0303    Lab Status: Final result Specimen: Blood from Arm, Left Updated: 04/08/24 0347     Procalcitonin <0.05 ng/ml     B-Type Natriuretic Peptide(BNP) [[id number]]  (Normal) Collected: 04/08/24 0303    Lab Status: Final result Specimen: Blood from Arm, Left Updated: 04/08/24 0345     BNP 35 pg/mL     Comprehensive metabolic panel [[id number]]  (Abnormal) Collected: 04/08/24 0303    Lab Status: Final result Specimen: Blood from Arm, Left Updated: 04/08/24 0341     Sodium 133 mmol/L      Potassium 4.4 mmol/L      Chloride 98 mmol/L      CO2 29 mmol/L      ANION GAP 6 mmol/L      BUN 17 mg/dL      Creatinine 0.78 mg/dL      Glucose 98 mg/dL      Calcium 9.1 mg/dL      AST 22 U/L      ALT 10 U/L      Alkaline Phosphatase 83 U/L      Total Protein 7.1 g/dL      Albumin 3.8 g/dL      Total Bilirubin 0.60 mg/dL      eGFR 69 ml/min/1.73sq m     Narrative:      National Kidney Disease Foundation guidelines for Chronic Kidney Disease (CKD):     Stage 1 with normal or high GFR (GFR > 90 mL/min/1.73 square meters)    Stage 2 Mild CKD (GFR = 60-89 mL/min/1.73 square meters)    Stage 3A Moderate CKD (GFR = 45-59 mL/min/1.73 square meters)    Stage 3B Moderate CKD (GFR = 30-44 mL/min/1.73 square meters)    Stage 4 Severe CKD (GFR = 15-29 mL/min/1.73 square meters)    Stage 5 End Stage CKD (GFR <15 mL/min/1.73 square meters)  Note: GFR calculation is accurate only with a steady state creatinine    CK [[id number]]  (Normal) Collected: 04/08/24 0303    Lab Status: Final result Specimen: Blood from Arm, Left Updated: 04/08/24 0341     Total CK 57 U/L     Lactic acid [[id number]]   (Normal) Collected: 04/08/24 0303    Lab Status: Final result Specimen: Blood from Arm, Left Updated: 04/08/24 0341     LACTIC ACID 1.2 mmol/L     Narrative:      Result may be elevated if tourniquet was used during collection.    Protime-INR [[id number]]  (Normal) Collected: 04/08/24 0303    Lab Status: Final result Specimen: Blood from Arm, Left Updated: 04/08/24 0337     Protime 13.0 seconds      INR 0.92    APTT [[id number]]  (Normal) Collected: 04/08/24 0303    Lab Status: Final result Specimen: Blood from Arm, Left Updated: 04/08/24 0337     PTT 35 seconds     CBC and differential [[id number]]  (Abnormal) Collected: 04/08/24 0303    Lab Status: Final result Specimen: Blood from Arm, Left Updated: 04/08/24 0327     WBC 8.65 Thousand/uL      RBC 4.25 Million/uL      Hemoglobin 12.1 g/dL      Hematocrit 39.1 %      MCV 92 fL      MCH 28.5 pg      MCHC 30.9 g/dL      RDW 15.3 %      MPV 11.6 fL      Platelets 150 Thousands/uL      nRBC 0 /100 WBCs      Neutrophils Relative 72 %      Immature Grans % 1 %      Lymphocytes Relative 11 %      Monocytes Relative 14 %      Eosinophils Relative 2 %      Basophils Relative 0 %      Neutrophils Absolute 6.25 Thousands/µL      Absolute Immature Grans 0.05 Thousand/uL      Absolute Lymphocytes 0.92 Thousands/µL      Absolute Monocytes 1.24 Thousand/µL      Eosinophils Absolute 0.18 Thousand/µL      Basophils Absolute 0.01 Thousands/µL     Blood culture #1 [[id number]] Collected: 04/08/24 0303    Lab Status: In process Specimen: Blood from Arm, Left Updated: 04/08/24 0313    Blood culture #2 [[id number]] Collected: 04/08/24 0303    Lab Status: In process Specimen: Blood from Arm, Left Updated: 04/08/24 0313    UA w Reflex to Microscopic w Reflex to Culture [[id number]]     Lab Status: No result Specimen: Urine                    XR chest portable   ED Interpretation by Phil Douglas MD (04/08 0241)   Poor inspiratory effort, patient is rotated, cardiac border overlies right  costophrenic angle however no discernible focal consolidation.  No acute cardiopulmonary disease.  Independently interpreted by myself.      XR foot 3+ views RIGHT   ED Interpretation by Phil Douglas MD (04/08 0241)   No acute fracture or dislocation. Interpreted independently by myself.            Procedures  ECG 12 Lead Documentation Only    Date/Time: 4/8/2024 2:45 AM    Performed by: Phil Douglas MD  Authorized by: Phil Douglas MD    ECG reviewed by me, the ED Provider: yes    Patient location:  ED  Previous ECG:     Previous ECG:  Compared to current    Similarity:  No change  Interpretation:     Interpretation: normal    Rate:     ECG rate:  87    ECG rate assessment: normal    Rhythm:     Rhythm: sinus rhythm    Ectopy:     Ectopy: none    QRS:     QRS axis:  Left    QRS intervals:  Normal  Conduction:     Conduction: abnormal      Abnormal conduction: complete RBBB    ST segments:     ST segments:  Normal  T waves:     T waves: normal    Comments:      Normal sinus rhythm at 87 bpm, left axis deviation, right bundle branch block, no acute ST elevation or depression        ED Course                             SBIRT 22yo+      Flowsheet Row Most Recent Value   Initial Alcohol Screen: US AUDIT-C     1. How often do you have a drink containing alcohol? 0 Filed at: 04/08/2024 0227   2. How many drinks containing alcohol do you have on a typical day you are drinking?  0 Filed at: 04/08/2024 0227   3b. FEMALE Any Age, or MALE 65+: How often do you have 4 or more drinks on one occassion? 0 Filed at: 04/08/2024 0227   Audit-C Score 0 Filed at: 04/08/2024 0227   ELIZABETH: How many times in the past year have you...    Used an illegal drug or used a prescription medication for non-medical reasons? Never Filed at: 04/08/2024 0227                  Medical Decision Making  83-year-old female with past medical history of lymphedema presents to the emergency department with complaints of leg swelling and  redness seen and examined noted to have of pitting edema as well as significant erythema concerning for cellulitis.  Differential diagnosis includes but is not limited to also osteomyelitis, occult fracture.  Due to patient's presentation blood work was obtained without acute pertinent findings.  Not meeting SIRS criteria at this time.  EKG as documented as above.  Radiographic imaging did not show any evidence of osteomyelitis or other acute process.  Started on vancomycin as well as cefepime.  Discussed with internal medicine service who accepted the patient for further evaluation and management under Dr. Grossman's service.    Amount and/or Complexity of Data Reviewed  Labs: ordered.  Radiology: ordered and independent interpretation performed.    Risk  Prescription drug management.  Decision regarding hospitalization.          Disposition  Final diagnoses:   Cellulitis     Time reflects when diagnosis was documented in both MDM as applicable and the Disposition within this note       Time User Action Codes Description Comment    4/8/2024  4:02 AM Phil Douglas [L03.90] Cellulitis           ED Disposition       ED Disposition   Admit    Condition   Stable    Date/Time   Mon Apr 8, 2024 0402    Comment   Case was discussed with LISA and the patient's admission status was agreed to be Admission Status: observation status to the service of Dr. Grossman.               Follow-up Information    None         Patient's Medications   Discharge Prescriptions    No medications on file     No discharge procedures on file.    PDMP Review         Value Time User    PDMP Reviewed  Yes 4/8/2024  1:47 AM Donal Cornell MD             ED Provider  Attending physically available and evaluated Elda Maxwell. I managed the patient along with the ED Attending.    Electronically Signed by           Phil Douglas MD  04/08/24 0406       Phil Douglas MD  04/08/24 0408

## 2024-04-08 NOTE — PHYSICAL THERAPY NOTE
Physical Therapy Cancellation Note         04/08/24 0836   Note Type   Note type Cancelled Session   Cancel Reasons Other   Additional Comments PT orders noted and chart reviewed.  Pt currently pending official read of R foot x-ray and podiatry consult.  Will continue to follow and evaluate as appropriate.     Sivan Portillo DPT

## 2024-04-08 NOTE — ASSESSMENT & PLAN NOTE
Pt states that she is unable to tolerate CPAP and refusing  Pt states that she is falling asleep during the day multiple times

## 2024-04-09 PROBLEM — E87.1 HYPONATREMIA: Status: RESOLVED | Noted: 2024-04-08 | Resolved: 2024-04-09

## 2024-04-09 LAB
ANION GAP SERPL CALCULATED.3IONS-SCNC: 4 MMOL/L (ref 4–13)
BASOPHILS # BLD AUTO: 0.02 THOUSANDS/ÂΜL (ref 0–0.1)
BASOPHILS NFR BLD AUTO: 0 % (ref 0–1)
BUN SERPL-MCNC: 14 MG/DL (ref 5–25)
CALCIUM SERPL-MCNC: 8.9 MG/DL (ref 8.4–10.2)
CHLORIDE SERPL-SCNC: 101 MMOL/L (ref 96–108)
CO2 SERPL-SCNC: 35 MMOL/L (ref 21–32)
CREAT SERPL-MCNC: 0.7 MG/DL (ref 0.6–1.3)
EOSINOPHIL # BLD AUTO: 0.16 THOUSAND/ÂΜL (ref 0–0.61)
EOSINOPHIL NFR BLD AUTO: 3 % (ref 0–6)
ERYTHROCYTE [DISTWIDTH] IN BLOOD BY AUTOMATED COUNT: 15.4 % (ref 11.6–15.1)
GFR SERPL CREATININE-BSD FRML MDRD: 79 ML/MIN/1.73SQ M
GLUCOSE SERPL-MCNC: 87 MG/DL (ref 65–140)
HCT VFR BLD AUTO: 38.5 % (ref 34.8–46.1)
HGB BLD-MCNC: 11.8 G/DL (ref 11.5–15.4)
IMM GRANULOCYTES # BLD AUTO: 0.03 THOUSAND/UL (ref 0–0.2)
IMM GRANULOCYTES NFR BLD AUTO: 1 % (ref 0–2)
LYMPHOCYTES # BLD AUTO: 0.82 THOUSANDS/ÂΜL (ref 0.6–4.47)
LYMPHOCYTES NFR BLD AUTO: 14 % (ref 14–44)
MCH RBC QN AUTO: 28.2 PG (ref 26.8–34.3)
MCHC RBC AUTO-ENTMCNC: 30.6 G/DL (ref 31.4–37.4)
MCV RBC AUTO: 92 FL (ref 82–98)
MONOCYTES # BLD AUTO: 0.95 THOUSAND/ÂΜL (ref 0.17–1.22)
MONOCYTES NFR BLD AUTO: 16 % (ref 4–12)
MRSA NOSE QL CULT: NORMAL
NEUTROPHILS # BLD AUTO: 4.08 THOUSANDS/ÂΜL (ref 1.85–7.62)
NEUTS SEG NFR BLD AUTO: 66 % (ref 43–75)
NRBC BLD AUTO-RTO: 0 /100 WBCS
PLATELET # BLD AUTO: 140 THOUSANDS/UL (ref 149–390)
PMV BLD AUTO: 12.1 FL (ref 8.9–12.7)
POTASSIUM SERPL-SCNC: 3.7 MMOL/L (ref 3.5–5.3)
RBC # BLD AUTO: 4.18 MILLION/UL (ref 3.81–5.12)
SODIUM SERPL-SCNC: 140 MMOL/L (ref 135–147)
VANCOMYCIN SERPL-MCNC: 8.8 UG/ML (ref 10–20)
WBC # BLD AUTO: 6.06 THOUSAND/UL (ref 4.31–10.16)

## 2024-04-09 PROCEDURE — 80048 BASIC METABOLIC PNL TOTAL CA: CPT | Performed by: INTERNAL MEDICINE

## 2024-04-09 PROCEDURE — 80202 ASSAY OF VANCOMYCIN: CPT | Performed by: INTERNAL MEDICINE

## 2024-04-09 PROCEDURE — 97163 PT EVAL HIGH COMPLEX 45 MIN: CPT

## 2024-04-09 PROCEDURE — 85025 COMPLETE CBC W/AUTO DIFF WBC: CPT

## 2024-04-09 PROCEDURE — 99232 SBSQ HOSP IP/OBS MODERATE 35: CPT | Performed by: INTERNAL MEDICINE

## 2024-04-09 PROCEDURE — NC001 PR NO CHARGE: Performed by: PODIATRIST

## 2024-04-09 PROCEDURE — 97112 NEUROMUSCULAR REEDUCATION: CPT

## 2024-04-09 RX ADMIN — ENOXAPARIN SODIUM 40 MG: 40 INJECTION SUBCUTANEOUS at 09:11

## 2024-04-09 RX ADMIN — CEFAZOLIN SODIUM 2000 MG: 2 SOLUTION INTRAVENOUS at 11:38

## 2024-04-09 RX ADMIN — ASPIRIN 81 MG CHEWABLE TABLET 81 MG: 81 TABLET CHEWABLE at 09:11

## 2024-04-09 RX ADMIN — MULTIPLE VITAMINS W/ MINERALS TAB 1 TABLET: TAB ORAL at 09:11

## 2024-04-09 RX ADMIN — FUROSEMIDE 60 MG: 20 TABLET ORAL at 08:19

## 2024-04-09 RX ADMIN — CEFAZOLIN SODIUM 2000 MG: 2 SOLUTION INTRAVENOUS at 20:29

## 2024-04-09 RX ADMIN — METOPROLOL TARTRATE 12.5 MG: 25 TABLET, FILM COATED ORAL at 09:09

## 2024-04-09 RX ADMIN — CEFAZOLIN SODIUM 2000 MG: 2 SOLUTION INTRAVENOUS at 03:44

## 2024-04-09 RX ADMIN — ATORVASTATIN CALCIUM 20 MG: 40 TABLET, FILM COATED ORAL at 16:43

## 2024-04-09 RX ADMIN — FUROSEMIDE 60 MG: 20 TABLET ORAL at 16:43

## 2024-04-09 NOTE — PROGRESS NOTES
Counts include 234 beds at the [facility]  Progress Note  Name: Elda Maxwell I  MRN: [id number]  Unit/Bed#: W -01 I Date of Admission: 4/8/2024   Date of Service: 4/9/2024 I Hospital Day: 0    Assessment/Plan   * Cellulitis  Assessment & Plan  POA from [facility] with extensive lower extremities erythema (see imaging in media)  H/o of chronic leg swelling due to lymphedema   Hemodynamically stable, not meeting SIRS criteria  Blood work gossly unremarkable  Xray right foot: no fx, no obvious bone involvement   IN  cc bolus, Cefepime, Vanco, tetanus diphtheira     Plan:  Monitor vitals and fever curve  Follow am cbc  Follow blood culture  IV cefazolin  Podiatry consulted, appreciate reccs    CKD (chronic kidney disease) stage 2, GFR 60-89 ml/min  Assessment & Plan  Lab Results   Component Value Date    EGFR 69 04/08/2024    EGFR 73 10/05/2023    EGFR 81 03/21/2023    CREATININE 0.78 04/08/2024    CREATININE 0.79 10/05/2023    CREATININE 0.73 03/21/2023   Avoid hypotension and nephrotoxic medications    Leg swelling  Assessment & Plan  Chronic lymphedema  Compression stockings once cellulitis gets better    Essential hypertension  Assessment & Plan  Continue home dose of furosemide 60 mg twice daily, Lopressor 12.5 mg daily    Obesity (BMI 30-39.9)  Assessment & Plan  Low calories low sugar diet  Encourage lifestyle modification on discharge    DAVE (obstructive sleep apnea)  Assessment & Plan  Pt states that she is unable to tolerate CPAP and refusing  Pt states that she is falling asleep during the day multiple times    Hyponatremia-resolved as of 4/9/2024  Assessment & Plan  POA with Na+ 133  Follow am BMP                 VTE Pharmacologic Prophylaxis: VTE Score: 5 High Risk (Score >/= 5) - Pharmacological DVT Prophylaxis Ordered: enoxaparin (Lovenox). Sequential Compression Devices Ordered.    Mobility:   Basic Mobility Inpatient Raw Score: 15  -M Goal: 4: Move to  chair/commode  JH-HLM Achieved: 6: Walk 10 steps or more  JH-HLM Goal NOT achieved. Continue with multidisciplinary rounding and encourage appropriate mobility to improve upon JH-HLM goals.    Patient Centered Rounds: I performed bedside rounds with nursing staff today.  Discussions with Specialists or Other Care Team Provider: None    Education and Discussions with Family / Patient: Attempted to update  (other) via phone. Unable to contact.    Current Length of Stay: 0 day(s)  Current Patient Status: Observation   Discharge Plan: Anticipate discharge in 24-48 hrs to rehab facility.    Code Status: Level 3 - DNAR and DNI    Subjective:   Patient mentions that the pain that she was experiencing at rest has started to improve.  She does not endorse any chest pain, shortness of breath, palpitations, pain abdomen, nausea, vomiting, diarrhea, constipation, symptoms suggestive of LUTS.    Objective:     Vitals:   Temp (24hrs), Av.5 °F (36.9 °C), Min:98.3 °F (36.8 °C), Max:98.6 °F (37 °C)    Temp:  [98.3 °F (36.8 °C)-98.6 °F (37 °C)] 98.3 °F (36.8 °C)  HR:  [75-84] 84  Resp:  [16-20] 16  BP: (132-157)/(62-93) 157/93  SpO2:  [90 %-97 %] 90 %  Body mass index is 33.16 kg/m².     Input and Output Summary (last 24 hours):     Intake/Output Summary (Last 24 hours) at 2024 1457  Last data filed at 2024 1239  Gross per 24 hour   Intake 1440 ml   Output 125 ml   Net 1315 ml       Physical Exam:   Physical Exam  Vitals and nursing note reviewed.   Constitutional:       General: She is not in acute distress.     Appearance: She is well-developed. She is obese.   HENT:      Head: Normocephalic and atraumatic.   Eyes:      Conjunctiva/sclera: Conjunctivae normal.   Cardiovascular:      Rate and Rhythm: Normal rate and regular rhythm.      Heart sounds: No murmur heard.  Pulmonary:      Effort: Pulmonary effort is normal. No respiratory distress.      Breath sounds: Normal breath sounds. No rhonchi or rales.    Abdominal:      General: There is no distension.      Palpations: Abdomen is soft.      Tenderness: There is no abdominal tenderness.   Musculoskeletal:         General: No swelling.      Cervical back: Neck supple.      Right lower leg: Edema present.      Left lower leg: Edema present.        Feet:    Feet:      Right foot:      Skin integrity: Erythema and warmth present.   Skin:     General: Skin is warm and dry.      Capillary Refill: Capillary refill takes less than 2 seconds.   Neurological:      Mental Status: She is alert and oriented to person, place, and time.   Psychiatric:         Mood and Affect: Mood normal.          Additional Data:     Labs:  Results from last 7 days   Lab Units 04/09/24  0553   WBC Thousand/uL 6.06   HEMOGLOBIN g/dL 11.8   HEMATOCRIT % 38.5   PLATELETS Thousands/uL 140*   SEGS PCT % 66   LYMPHO PCT % 14   MONO PCT % 16*   EOS PCT % 3     Results from last 7 days   Lab Units 04/09/24  0553 04/08/24  0557 04/08/24  0303   SODIUM mmol/L 140   < > 133*   POTASSIUM mmol/L 3.7   < > 4.4   CHLORIDE mmol/L 101   < > 98   CO2 mmol/L 35*   < > 29   BUN mg/dL 14   < > 17   CREATININE mg/dL 0.70   < > 0.78   ANION GAP mmol/L 4   < > 6   CALCIUM mg/dL 8.9   < > 9.1   ALBUMIN g/dL  --   --  3.8   TOTAL BILIRUBIN mg/dL  --   --  0.60   ALK PHOS U/L  --   --  83   ALT U/L  --   --  10   AST U/L  --   --  22   GLUCOSE RANDOM mg/dL 87   < > 98    < > = values in this interval not displayed.     Results from last 7 days   Lab Units 04/08/24  0303   INR  0.92             Results from last 7 days   Lab Units 04/08/24  0303   LACTIC ACID mmol/L 1.2   PROCALCITONIN ng/ml <0.05       Lines/Drains:  Invasive Devices       Peripheral Intravenous Line  Duration             Peripheral IV 04/08/24 Left;Ventral (anterior) Forearm 1 day                          Imaging: Reviewed radiology reports from this admission including: ultrasound(s)    Recent Cultures (last 7 days):   Results from last 7 days   Lab  Units 04/08/24  1235 04/08/24  0303   BLOOD CULTURE   --  No Growth at 24 hrs.  No Growth at 24 hrs.   GRAM STAIN RESULT  3+ Gram positive rods*  2+ Gram positive cocci in pairs*  1+ Gram positive cocci in chains*  1+ Gram negative rods*  Rare Disintegrating polys*  --    WOUND CULTURE  Culture too young- will reincubate  --        Last 24 Hours Medication List:   Current Facility-Administered Medications   Medication Dose Route Frequency Provider Last Rate    acetaminophen  650 mg Oral Q6H PRN Kari Blancas MD      aspirin  81 mg Oral Daily Kari Blancas MD      atorvastatin  20 mg Oral Daily With Dinner Kari Blancas MD      cefazolin  2,000 mg Intravenous Q8H Matthew Gordon MD 2,000 mg (04/09/24 1138)    enoxaparin  40 mg Subcutaneous Daily Kari Blancas MD      furosemide  60 mg Oral BID (diuretic) Kari Blancas MD      metoprolol tartrate  12.5 mg Oral Daily Kari Blancas MD      multivitamin-minerals  1 tablet Oral Daily Kari Blancas MD          Today, Patient Was Seen By: Matthew Gordon MD    **Please Note: This note may have been constructed using a voice recognition system.**

## 2024-04-09 NOTE — PLAN OF CARE
Problem: Prexisting or High Potential for Compromised Skin Integrity  Goal: Skin integrity is maintained or improved  Description: INTERVENTIONS:  - Identify patients at risk for skin breakdown  - Assess and monitor skin integrity  - Assess and monitor nutrition and hydration status  - Monitor labs   - Assess for incontinence   - Turn and reposition patient  - Assist with mobility/ambulation  - Relieve pressure over bony prominences  - Avoid friction and shearing  - Provide appropriate hygiene as needed including keeping skin clean and dry  - Evaluate need for skin moisturizer/barrier cream  - Collaborate with interdisciplinary team   - Patient/family teaching  - Consider wound care consult   Outcome: Progressing     Problem: PAIN - ADULT  Goal: Verbalizes/displays adequate comfort level or baseline comfort level  Description: Interventions:  - Encourage patient to monitor pain and request assistance  - Assess pain using appropriate pain scale  - Administer analgesics based on type and severity of pain and evaluate response  - Implement non-pharmacological measures as appropriate and evaluate response  - Consider cultural and social influences on pain and pain management  - Notify physician/advanced practitioner if interventions unsuccessful or patient reports new pain  Outcome: Progressing     Problem: INFECTION - ADULT  Goal: Absence or prevention of progression during hospitalization  Description: INTERVENTIONS:  - Assess and monitor for signs and symptoms of infection  - Monitor lab/diagnostic results  - Monitor all insertion sites, i.e. indwelling lines, tubes, and drains  - Monitor endotracheal if appropriate and nasal secretions for changes in amount and color  - Scalf appropriate cooling/warming therapies per order  - Administer medications as ordered  - Instruct and encourage patient and family to use good hand hygiene technique  - Identify and instruct in appropriate isolation precautions for  identified infection/condition  Outcome: Progressing     Problem: DISCHARGE PLANNING  Goal: Discharge to home or other facility with appropriate resources  Description: INTERVENTIONS:  - Identify barriers to discharge w/patient and caregiver  - Arrange for needed discharge resources and transportation as appropriate  - Identify discharge learning needs (meds, wound care, etc.)  - Arrange for interpretive services to assist at discharge as needed  - Refer to Case Management Department for coordinating discharge planning if the patient needs post-hospital services based on physician/advanced practitioner order or complex needs related to functional status, cognitive ability, or social support system  Outcome: Progressing     Problem: Knowledge Deficit  Goal: Patient/family/caregiver demonstrates understanding of disease process, treatment plan, medications, and discharge instructions  Description: Complete learning assessment and assess knowledge base.  Interventions:  - Provide teaching at level of understanding  - Provide teaching via preferred learning methods  Outcome: Progressing     Problem: Potential for Falls  Goal: Patient will remain free of falls  Description: INTERVENTIONS:  - Educate patient/family on patient safety including physical limitations  - Instruct patient to call for assistance with activity   - Consult OT/PT to assist with strengthening/mobility   - Keep Call bell within reach  - Keep bed low and locked with side rails adjusted as appropriate  - Keep care items and personal belongings within reach  - Initiate and maintain comfort rounds  - Make Fall Risk Sign visible to staff  - Offer Toileting every  Hours, in advance of need  - Initiate/Maintain alarm  - Obtain necessary fall risk management equipment:  Apply yellow socks and bracelet for high fall risk patients  - Consider moving patient to room near nurses station  Outcome: Progressing

## 2024-04-09 NOTE — UTILIZATION REVIEW
Initial Clinical Review      4/9/ 24 @  1730 :awaiting IP order to be placed,pt  reaches 48 hrs as OBS overnight  (4/10/24 @ 0403). Will fax review while awaiting IP order. Ongoing IV abx .       Admission: Date/Time/Statement:   Admission Orders (From admission, onward)       Ordered        04/08/24 0403  Place in Observation  Once                          Orders Placed This Encounter   Procedures    Place in Observation     Standing Status:   Standing     Number of Occurrences:   1     Order Specific Question:   Level of Care     Answer:   Med Surg [16]     ED Arrival Information       Expected   -    Arrival   4/8/2024 01:43    Acuity   Urgent              Means of arrival   Ambulance    Escorted by   [facility]    Service   Hospitalist    Admission type   Emergency              Arrival complaint   Foot Infection             Chief Complaint   Patient presents with    Foot Pain     Patient arrived via EMS from [facility] with bilateral feet swelling, pain, and redness.       Initial Presentation: 83 y.o. female with PMH of HTN, HLD, CKD, chronic lymphedema who presents to ED from [facility] with worsening LE swelling x 3 days from chronic LE edema. Pt has also developed new right foot redness and swelling . Pr reports pain w/ ambulation . On exam,significant readness and swelling in the right foot with demarcating line. BLE edema.  Labs Na 133. On prelim read XR R ft shows nothing acute. Pt given IV abx, IVF bolus in ED. Pt admitted as OBS with cellulitis. Hyponatremia  . Plan- IV abx- cefepime . F/U blood,wound  MRSA cx . CBC in am. Obtain venous duplex BLE. F/U XR R ft . CRP . Compression stocking,. Restart home diuretics and monitor edema . BMP in am .        Date: 4/9   Medicine- CRP on admission 10.0. WBC WNL.  Na normalized today . IV abx changed to IV Ancef yesterday. Continue IV Ancef . Leg elevation . Edema control with p.o. diuretics . Pt  reports pain at rest starting to  improve . Erythema, warm, tender R ft .  Podiatry consult pending .    Podiatry consult- Right foot cellulitis Lower extremity edema . Cellulitis of the right forefoot extending to the level of the midfoot with no open wounds.  Exam :Right DP and PT pulses are nonpalpable secondary to edema but biphasic on Doppler. Left DP and PT pulses are nonpalpable secondary to edema but biphasic on Doppler. CRT < 3 seconds at the digits. +2/4 edema noted at bilateral lower extremities. Pedal hair is absent. Skin temperature is warm to the right foot compared to the LLE .  No open lesions noted to the interdigital spaces, however there is peeling xerotic skin  Venous duplex BLE neg for DVT ,. CXR R ft w/o evidence osteomyelitis. Plan-  Continue IV abx per primary team .  Given there are no open wounds the to the right foot or within any of the interdigital spaces, reliability of wound culture may be questionable .  BLE elevation . WBAT BLE .         4/9 RLE         4/9 BLE         4/9 RLE         4/9 RLE         4/9 RLE     ED Triage Vitals   Temperature Pulse Respirations Blood Pressure SpO2   04/08/24 0148 04/08/24 0148 04/08/24 0148 04/08/24 0148 04/08/24 0148   98.2 °F (36.8 °C) 89 18 162/77 92 %      Temp Source Heart Rate Source Patient Position - Orthostatic VS BP Location FiO2 (%)   04/08/24 0148 04/08/24 0148 04/08/24 0445 04/08/24 0445 --   Oral Monitor Sitting Left arm       Pain Score       04/08/24 0900       No Pain          Wt Readings from Last 1 Encounters:   04/09/24 82.2 kg (181 lb 5 oz)     Additional Vital Signs:   Date/Time Temp Pulse Resp BP MAP (mmHg) SpO2   04/09/24 0000 -- -- -- -- -- 92 %   04/08/24 2300 -- 75 20 -- -- 90 %    SpO2: 90 at 04/08/24 2300   04/08/24 2126 -- -- -- -- -- --   04/08/24 21:23:47 98.6 °F (37 °C) 80 -- 152/80 104 91 %   04/08/24 1700 -- 78 18 132/62 89 97 %   04/08/24 1652 -- -- 18 132/62 -- 96 %   04/08/24 1128 -- -- -- 130/62 89 --   04/08/24 0930 -- 81 -- 133/63 91 96 %    04/08/24 0900 -- 81 -- 122/58 83 96 %   04/08/24 0829 -- 87 -- 124/60 -- --   04/08/24 0730 -- 80 -- 133/61 88 97 %   04/08/24 0700 -- 85 -- 135/62 89 97 %   04/08/24 0630 -- 84 18 138/63 91 96 %   04/08/24 0600 -- 82 18 121/58 84 92 %   04/08/24 0530 -- 86 18 122/60 86 90 %   04/08/24 0500 -- 90 18 141/71 101 92 %   04/08/24 0445 -- 88 18 145/68 98 93 %     Pertinent Labs/Diagnostic Test Results:    4/8 ECG- Normal sinus rhythm  Left axis deviation  Right bundle branch block   VAS VENOUS DUPLEX - LOWER LIMB BILATERAL   Final Result by Adi Sharp MD (04/08 2116)      XR chest portable   ED Interpretation by Phil Douglas MD (04/08 0241)   Poor inspiratory effort, patient is rotated, cardiac border overlies right costophrenic angle however no discernible focal consolidation.  No acute cardiopulmonary disease.  Independently interpreted by myself.      XR foot 3+ views RIGHT   ED Interpretation by Phil Douglas MD (04/08 0241)   No acute fracture or dislocation. Interpreted independently by myself.      4/8 venous duplex BLE   RIGHT LOWER LIMB:  No evidence of acute or chronic deep vein thrombosis.  No evidence of superficial thrombophlebitis noted.  Doppler evaluation shows a normal response to augmentation maneuvers..  Popliteal, posterior tibial and anterior tibial arterial Doppler waveform's are  biphasic/hyperemic.     LEFT LOWER LIMB:  No evidence of acute or chronic deep vein thrombosis.  No evidence of superficial thrombophlebitis noted.  Doppler evaluation shows a normal response to augmentation maneuvers.  Popliteal, posterior tibial and anterior tibial arterial Doppler waveform's are  triphasic/hyperemic.     Technically limited study- unable to perform compressions on some parts of the  study due to patients pain.      Results from last 7 days   Lab Units 04/09/24  0553 04/08/24  0557 04/08/24  0303   WBC Thousand/uL 6.06 7.93 8.65   HEMOGLOBIN g/dL 11.8 11.0* 12.1   HEMATOCRIT %  "38.5 35.8 39.1   PLATELETS Thousands/uL 140* 132* 150   NEUTROS ABS Thousands/µL 4.08 5.84 6.25         Results from last 7 days   Lab Units 04/09/24  0553 04/08/24  0557 04/08/24  0303   SODIUM mmol/L 140 136 133*   POTASSIUM mmol/L 3.7 3.9 4.4   CHLORIDE mmol/L 101 102 98   CO2 mmol/L 35* 30 29   ANION GAP mmol/L 4 4 6   BUN mg/dL 14 16 17   CREATININE mg/dL 0.70 0.68 0.78   EGFR ml/min/1.73sq m 79 80 69   CALCIUM mg/dL 8.9 8.3* 9.1     Results from last 7 days   Lab Units 04/08/24  0303   AST U/L 22   ALT U/L 10   ALK PHOS U/L 83   TOTAL PROTEIN g/dL 7.1   ALBUMIN g/dL 3.8   TOTAL BILIRUBIN mg/dL 0.60         Results from last 7 days   Lab Units 04/09/24  0553 04/08/24  0557 04/08/24  0303   GLUCOSE RANDOM mg/dL 87 99 98             No results found for: \"BETA-HYDROXYBUTYRATE\"               Results from last 7 days   Lab Units 04/08/24  0303   CK TOTAL U/L 57             Results from last 7 days   Lab Units 04/08/24  0303   PROTIME seconds 13.0   INR  0.92   PTT seconds 35         Results from last 7 days   Lab Units 04/08/24  0303   PROCALCITONIN ng/ml <0.05     Results from last 7 days   Lab Units 04/08/24  0303   LACTIC ACID mmol/L 1.2             Results from last 7 days   Lab Units 04/08/24  0303   BNP pg/mL 35                         Results from last 7 days   Lab Units 04/08/24  0557   CRP mg/L 10.0*             Results from last 7 days   Lab Units 04/08/24  1441   CLARITY UA  Clear   COLOR UA  Colorless   SPEC GRAV UA  1.005   PH UA  7.0   GLUCOSE UA mg/dl Negative   KETONES UA mg/dl Negative   BLOOD UA  Negative   PROTEIN UA mg/dl Negative   NITRITE UA  Negative   BILIRUBIN UA  Negative   UROBILINOGEN UA (BE) mg/dl <2.0   LEUKOCYTES UA  Negative                                 Results from last 7 days   Lab Units 04/08/24  1235 04/08/24  0303   BLOOD CULTURE   --  No Growth at 24 hrs.  No Growth at 24 hrs.   GRAM STAIN RESULT  3+ Gram positive rods*  2+ Gram positive cocci in pairs*  1+ Gram positive " cocci in chains*  1+ Gram negative rods*  Rare Disintegrating polys*  --                    ED Treatment:   Medication Administration from 04/08/2024 0143 to 04/08/2024 2116         Date/Time Order Dose Route Action     04/08/2024 0234 EDT tetanus-diphtheria-acellular pertussis (BOOSTRIX) IM injection 0.5 mL 0.5 mL Intramuscular Given     04/08/2024 0634 EDT vancomycin (VANCOCIN) 1500 mg in sodium chloride 0.9% 250 mL IVPB 0 mg Intravenous Stopped     04/08/2024 0440 EDT vancomycin (VANCOCIN) 1500 mg in sodium chloride 0.9% 250 mL IVPB 1,500 mg Intravenous New Bag     04/08/2024 0437 EDT cefepime (MAXIPIME) 2 g/50 mL dextrose IVPB 0 mg Intravenous Stopped     04/08/2024 0346 EDT cefepime (MAXIPIME) 2 g/50 mL dextrose IVPB 2,000 mg Intravenous New Bag     04/08/2024 0437 EDT sodium chloride 0.9 % bolus 500 mL 0 mL Intravenous Stopped     04/08/2024 0348 EDT sodium chloride 0.9 % bolus 500 mL 500 mL Intravenous New Bag     04/08/2024 0828 EDT aspirin chewable tablet 81 mg 81 mg Oral Given     04/08/2024 1642 EDT furosemide (LASIX) tablet 60 mg 0 mg Oral Hold     04/08/2024 0828 EDT furosemide (LASIX) tablet 60 mg 60 mg Oral Given     04/08/2024 0829 EDT metoprolol tartrate (LOPRESSOR) partial tablet 12.5 mg 12.5 mg Oral Given     04/08/2024 0834 EDT multivitamin-minerals (CENTRUM) tablet 1 tablet 1 tablet Oral Given     04/08/2024 1645 EDT atorvastatin (LIPITOR) tablet 20 mg 20 mg Oral Given     04/08/2024 0828 EDT enoxaparin (LOVENOX) subcutaneous injection 40 mg 40 mg Subcutaneous Given     04/08/2024 1156 EDT cefepime (MAXIPIME) 1,000 mg in dextrose 5 % 50 mL IVPB 0 mg Intravenous Stopped     04/08/2024 1126 EDT cefepime (MAXIPIME) 1,000 mg in dextrose 5 % 50 mL IVPB 1,000 mg Intravenous New Bag     04/08/2024 0858 EDT vancomycin (VANCOCIN) IVPB (premix in dextrose) 500 mg 100 mL 0 mg Intravenous Stopped     04/08/2024 0828 EDT vancomycin (VANCOCIN) IVPB (premix in dextrose) 500 mg 100 mL 500 mg Intravenous New  Bag     04/08/2024 1937 EDT ceFAZolin (ANCEF) IVPB (premix in dextrose) 2,000 mg 50 mL 2,000 mg Intravenous New Bag          Past Medical History:   Diagnosis Date    Acid reflux     Breast cancer (HCC)     Armidex    Breast cancer (HCC)     DVT (deep venous thrombosis) (HCC) 07/2016    subacute/chronic dvt on the right side in     Edema     chronic leg edema, PSBO 12/31/2015    Heart failure (HCC)     History of echocardiogram 07/01/2016    Transthoracic Echocardiogram 7/1/16: LV normal size left ventricle. no LVH. normal global systolic LV function (EF 61%). no gross regional wall motion abnormality. normal diastolic LV function. RV normal size. no RVH . normal RV function. Left atrium is normal in size. normal appearing atrial septum. Right atrium is normal in size. normal appearing atrial septum. mitral valve has mild thickenin    History of EKG 11/01/2017    EKG 11/1/17: normal sinus rhythm; RBBB, left anterior fascicular block, bifascicular block; moderate voltage criteria for LVH, may be normal variant, abnormal ECG , EKG 5/26/17: normal sinus rhythm, RBBB, left anterior fascicular block, bifascicular block; moderate voltage criteria for LVH, may be normal variant; abnormal ECG. . EKG 3/1/17: normal sinus rhythm; RBBB, left anterior fascicular bloc    History of stress test 04/25/2014    Adenosine Cardiolite Stress Test 4/25/14: EKG portion- 1) the stress test was inconclusive for myocardial ischemia due to baseline EKG abnormalities 2) physiologic response to adenosine infusion 3) cardiolite scan is attached. Conclusion: Very mildly abnormal Adenosine Cardiolite SPECT study. there is a small fixed mild defect in the left ventricular apex. there is no reversible ischemia. diffe    Hyperlipidemia     Hypertension     Hypertension     Leg edema     chronic     Lymphedema     Osteopenia     Peripheral vascular disease (HCC)     Scoliosis     Sleep apnea     Thrombocytopenia (HCC)     Vitamin D deficiency       Present on Admission:   Essential hypertension   Obesity (BMI 30-39.9)   DAVE (obstructive sleep apnea)   Leg swelling   CKD (chronic kidney disease) stage 2, GFR 60-89 ml/min      Admitting Diagnosis: Cellulitis [L03.90]  Foot infection [L08.9]  Age/Sex: 83 y.o. female  Admission Orders:  Scheduled Medications:  aspirin, 81 mg, Oral, Daily  atorvastatin, 20 mg, Oral, Daily With Dinner  cefazolin, 2,000 mg, Intravenous, Q8H  enoxaparin, 40 mg, Subcutaneous, Daily  furosemide, 60 mg, Oral, BID (diuretic)  metoprolol tartrate, 12.5 mg, Oral, Daily  multivitamin-minerals, 1 tablet, Oral, Daily    cefepime (MAXIPIME) 1,000 mg in dextrose 5 % 50 mL IVPB  Dose: 1,000 mg  Freq: Every 12 hours Route: IV  Last Dose: Stopped (04/08/24 1156)  Start: 04/08/24 1200 End: 04/08/24 1146   Continuous IV Infusions:     PRN Meds:  acetaminophen, 650 mg, Oral, Q6H PRN    Level 2 carb, 2 Gm na diet , FR 1800 ml    SCD   OOB as dulce maria   Knee hi compression stockings    IP CONSULT TO PODIATRY    Network Utilization Review Department  ATTENTION: Please call with any questions or concerns to [phone] and carefully listen to the prompts so that you are directed to the right person. All voicemails are confidential.   For Discharge needs, contact Care Management DC Support Team at [phone] opt. 2  Send all requests for admission clinical reviews, approved or denied determinations and any other requests to dedicated fax number below belonging to the [facility] where the patient is receiving treatment. List of dedicated fax numbers for the Facilities:  FACILITY NAME UR FAX NUMBER   ADMISSION DENIALS (Administrative/Medical Necessity) [phone]   DISCHARGE SUPPORT TEAM (NETWORK) [phone]   PARENT CHILD HEALTH (Maternity/NICU/Pediatrics) [phone]   [facility] [phone]   [facility] [phone]   [facility] [phone]   [facility]  [facility] [phone]   [facility] [phone]   [facility] [phone]   [facility] [phone]   [facility] [phone]   [facility] [phone]   [facility] [phone]   [facility] [phone]   [facility] [phone]

## 2024-04-09 NOTE — PLAN OF CARE
Problem: PHYSICAL THERAPY ADULT  Goal: Performs mobility at highest level of function for planned discharge setting.  See evaluation for individualized goals.  Description: Treatment/Interventions: Functional transfer training, LE strengthening/ROM, Therapeutic exercise, Endurance training, Cognitive reorientation, Equipment eval/education, Bed mobility, Patient/family training, Gait training, Spoke to nursing  Equipment Recommended: Walker       See flowsheet documentation for full assessment, interventions and recommendations.  Note: Prognosis: Fair  Problem List: Decreased strength, Decreased range of motion, Impaired balance, Decreased endurance, Decreased mobility, Pain, Decreased skin integrity, Obesity, Decreased cognition (substantial forward head posture.)  Assessment: Pt is a 83 y.o. female seen for PT evaluation s/p admit to [facility] on 4/8/2024 w/ Cellulitis.  Order placed for PT.      Prior to admission: Pt [facility], and reportedly ambulated independently with a walker including to and from dining bates 3 times of day.  Upon evaluation: Pt assist of 1 for bed mobility and transfers as well as short distance ambulation to the bathroom using a rolling walker..      Pt's clinical presentation is currently unstable/unpredictable given the functional mobility deficits above, especially (but not limited to) pain, decreased functional mobility tolerance, and substantially limited upright cervical posture , and combined with medical complications including abnormal CO2 values and fear/retreat.  Pt IS NOT at her mobility baseline.  She is at risk for falls based on impaired balance, obesity, and limited upright cervical posture w/ her COG/SAMANTHA .       During this admission, pt would benefit from continued skilled inpatient PT in the acute care setting in order to address deficits as defined above to maximize function and mobility.      Recommendations:     From a PT  standpoint, recommend next several sessions focus on continued mobility training with balance and strengthening, as well as upright postural activities as able.    Uncertain if patient would be appropriate for PM&R consult in the future after discharge for bracing to promote upright posture during functional mobility  Barriers to Discharge: Decreased caregiver support, Inaccessible home environment     Rehab Resource Intensity Level, PT: II (Moderate Resource Intensity) (unless facility can provide more physical A for Pt upon DC for transfers and amb DISTANCES)    See flowsheet documentation for full assessment.

## 2024-04-09 NOTE — PLAN OF CARE
Problem: Prexisting or High Potential for Compromised Skin Integrity  Goal: Skin integrity is maintained or improved  Description: INTERVENTIONS:  - Identify patients at risk for skin breakdown  - Assess and monitor skin integrity  - Assess and monitor nutrition and hydration status  - Monitor labs   - Assess for incontinence   - Turn and reposition patient  - Assist with mobility/ambulation  - Relieve pressure over bony prominences  - Avoid friction and shearing  - Provide appropriate hygiene as needed including keeping skin clean and dry  - Evaluate need for skin moisturizer/barrier cream  - Collaborate with interdisciplinary team   - Patient/family teaching  - Consider wound care consult   Outcome: Progressing     Problem: PAIN - ADULT  Goal: Verbalizes/displays adequate comfort level or baseline comfort level  Description: Interventions:  - Encourage patient to monitor pain and request assistance  - Assess pain using appropriate pain scale  - Administer analgesics based on type and severity of pain and evaluate response  - Implement non-pharmacological measures as appropriate and evaluate response  - Consider cultural and social influences on pain and pain management  - Notify physician/advanced practitioner if interventions unsuccessful or patient reports new pain  Outcome: Progressing     Problem: INFECTION - ADULT  Goal: Absence or prevention of progression during hospitalization  Description: INTERVENTIONS:  - Assess and monitor for signs and symptoms of infection  - Monitor lab/diagnostic results  - Monitor all insertion sites, i.e. indwelling lines, tubes, and drains  - Monitor endotracheal if appropriate and nasal secretions for changes in amount and color  - [place] appropriate cooling/warming therapies per order  - Administer medications as ordered  - Instruct and encourage patient and family to use good hand hygiene technique  - Identify and instruct in appropriate isolation precautions for  identified infection/condition  Outcome: Progressing     Problem: DISCHARGE PLANNING  Goal: Discharge to home or other facility with appropriate resources  Description: INTERVENTIONS:  - Identify barriers to discharge w/patient and caregiver  - Arrange for needed discharge resources and transportation as appropriate  - Identify discharge learning needs (meds, wound care, etc.)  - Arrange for interpretive services to assist at discharge as needed  - Refer to Case Management Department for coordinating discharge planning if the patient needs post-hospital services based on physician/advanced practitioner order or complex needs related to functional status, cognitive ability, or social support system  Outcome: Progressing     Problem: Knowledge Deficit  Goal: Patient/family/caregiver demonstrates understanding of disease process, treatment plan, medications, and discharge instructions  Description: Complete learning assessment and assess knowledge base.  Interventions:  - Provide teaching at level of understanding  - Provide teaching via preferred learning methods  Outcome: Progressing

## 2024-04-09 NOTE — PHYSICAL THERAPY NOTE
PHYSICAL THERAPY EVALUATION  NAME:  Elda Maxwell  DATE: 04/09/24    AGE:   83 y.o.  Mrn:   [id number]  Principal problem: Principal Problem:    Cellulitis  Active Problems:    DAVE (obstructive sleep apnea)    Obesity (BMI 30-39.9)    Essential hypertension    Leg swelling    CKD (chronic kidney disease) stage 2, GFR 60-89 ml/min    Hyponatremia      Vitals:    04/08/24 2300 04/09/24 0000 04/09/24 0600 04/09/24 0757   BP:    157/93   BP Location:       Pulse: 75   84   Resp: 20   16   Temp:    98.3 °F (36.8 °C)   TempSrc:       SpO2: 90% 92%  90%   Weight:   82.2 kg (181 lb 5 oz)    Height:           Length Of Stay: 0  Performed at least 2 patient identifiers during session: Name and ID bracelet  PHYSICAL THERAPY EVALUATION :    04/09/24 1016   PT Last Visit   PT Visit Date 04/09/24   Note Type   Note type Evaluation   Pain Assessment   Pain Assessment Tool FLACC   Pain Score   (initiall)   Pain Location/Orientation Orientation: Bilateral;Orientation: Lower;Location: Leg;Location: Foot   Effect of Pain on Daily Activities limits speed and indep of mobility, TTP and light touch @ lower legs/feet   Patient's Stated Pain Goal No pain   Pain Rating: FLACC (Rest) - Face 0   Pain Rating: FLACC (Rest) - Legs 1   Pain Rating: FLACC (Rest) - Activity 1   Pain Rating: FLACC (Rest) - Cry 1   Pain Rating: FLACC (Rest) - Consolability 0   Score: FLACC (Rest) 3   Pain Rating: FLACC (Activity) - Face 1   Pain Rating: FLACC (Activity) - Legs 1   Pain Rating: FLACC (Activity) - Activity 1   Pain Rating: FLACC (Activity) - Cry 1   Pain Rating: FLACC (Activity) - Consolability 1   Score: FLACC (Activity) 5   Restrictions/Precautions   Weight Bearing Precautions Per Order No   Other Precautions Bed Alarm;Chair Alarm;Cognitive;Fall Risk   Home Living   Type of Home Assisted living   Home Layout One level   Home Equipment Walker  (w/ tray per pt)   Additional Comments Per Case management notes: Facility manages meds, patient dresses  "self, does have help with bathing, Indep w/ walker   Prior Function   Level of [place] Independent with functional mobility  (as per notes)   Lives With Facility staff   IADLs Family/Friend/Other provides transportation;Family/Friend/Other provides meals;Family/Friend/Other provides medication management   Falls in the last 6 months   (did not quantify)   Comments Pt reports that she has to walk to dining bates 3x daily \"far walk\" per pt   General   Family/Caregiver Present No   Cognition   Arousal/Participation Alert   Orientation Level Oriented to person   Following Commands Follows one step commands inconsistently   Subjective   Subjective Pt denies any lightheadedness, + fear of falling   RLE Assessment   RLE Assessment   (redness/edema noted at lower legs and portion of feet)   LLE Assessment   LLE Assessment   (redness/edema noted at lower legs and portion of feet)   Coordination   Movements are Fluid and Coordinated 0   Light Touch   RLE Light Touch Not tested  (per pt request due to tickilish/pain)   LLE Light Touch Not tested  (per pt request due to tickilish/pain)   Bed Mobility   Supine to Sit 3  Moderate assistance   Additional items Assist x 1;HOB elevated;Bedrails;Increased time required;Verbal cues  (however pt who appears <5'2\" is on [facility] bed (has 16.5\") deck height deflated)   Additional Comments Posture: Substantially forward head and increased thoracic kyphosis   Transfers   Sit to Stand 3  Moderate assistance   Additional items Assist x 1;Increased time required;Verbal cues;Armrests  (w/walker blocked)   Toilet transfer 4  Minimal assistance  (stand->sit x2, sit->stand x1)   Additional items Assist x 1;Increased time required;Verbal cues;Standard toilet  (R grab bar)   Ambulation/Elevation   Gait pattern Excessively slow  (substantial forward head>upper T spine)   Gait Assistance 5  Supervision   Additional items Assist x 1;Verbal cues   Assistive Device Rolling walker "   Distance 20'   Balance   Static Sitting Good   Static Standing Poor +   Ambulatory Poor   Endurance Deficit   Endurance Deficit Yes   Endurance Deficit Description limited amb distance and standing tolerance   Activity Tolerance   Activity Tolerance Patient limited by pain;Patient limited by fatigue   Nurse Made Aware care coordination w/Bakari from nursing   Assessment:   Pt is a 83 y.o. female seen for PT evaluation s/p admit to [facility] on 4/8/2024 w/ Cellulitis.  Order placed for PT.      Prior to admission: Pt lived at [facility] personal-care [place], and reportedly ambulated independently with a walker including to and from dining bates 3 times of day.  Upon evaluation: Pt assist of 1 for bed mobility and transfers as well as short distance ambulation to the bathroom using a rolling walker..      Pt's clinical presentation is currently unstable/unpredictable given the functional mobility deficits above, especially (but not limited to) pain, decreased functional mobility tolerance, and substantially limited upright cervical posture, and combined with medical complications including abnormal CO2 values and fear/retreat.  Pt IS NOT at her mobility baseline.  She is at risk for falls based on impaired balance, obesity, and limited upright cervical posture w/ her COG/SAMANTHA.       During this admission, pt would benefit from continued skilled inpatient PT in the acute care setting in order to address deficits as defined above to maximize function and mobility.      Recommendations:    From a PT standpoint, recommend next several sessions focus on continued mobility training with balance and strengthening, as well as upright postural activities as able.   Uncertain if patient would be appropriate for PM&R consult in the future after discharge for bracing to promote upright posture during functional mobility       Prognosis Fair   Problem List Decreased strength;Decreased range of motion;Impaired  balance;Decreased endurance;Decreased mobility;Pain;Decreased skin integrity;Obesity;Decreased cognition  (substantial forward head posture.)   Barriers to Discharge Decreased caregiver support;Inaccessible home environment   Goals   Patient Goals to get to the toilet   STG Expiration Date 04/19/24   Short Term Goal #1 Goals: Pt will: Perform rolling  and supine<>sit bed mobility tasks with Supervision to prepare for transfers and reposition in bed. Perform transfers with Supervision to promote proper use of assistive device. Perform ambulation with LRAD for at least 50' with Supervision to increase Indep in prior living environment including to/from dining bates. Perform standing tolerance >5 mi w/ intermittent UE support to improve activity tolerance.   Plan   Treatment/Interventions Functional transfer training;LE strengthening/ROM;Therapeutic exercise;Endurance training;Cognitive reorientation;Equipment eval/education;Bed mobility;Patient/family training;Gait training;Spoke to nursing   PT Frequency 3-5x/wk   Discharge Recommendation   Rehab Resource Intensity Level, PT II (Moderate Resource Intensity)  (unless facility can provide more physical A for Pt upon DC for transfers and amb DISTANCES)   Equipment Recommended Walker   Additional Comments (S)  Please note that I antipate pt will require LESS physical A for bed mobility and sit-> stand transfers from bed due to pt in a bed with a HIGHER than appropriate DECK height for this patient despite seat deflate engaged. Notified RN that pt should have lower height bed   Additional Comments 2 Personal factors affecting pt at time of IE include: limited home support, advanced age, past experience, behavioral pattern, inability to perform ADLs, inability to ambulate household distances, and inability to navigate community distances.   AM-PAC Basic Mobility Inpatient   Turning in Flat Bed Without Bedrails 3   Lying on Back to Sitting on Edge of Flat Bed Without  Bedrails 2   Moving Bed to Chair 3   Standing Up From Chair Using Arms 3   Walk in Room 3   Climb 3-5 Stairs With Railing 1   Basic Mobility Inpatient Raw Score 15   Basic Mobility Standardized Score 36.97   [facility] Highest Level Of Mobility   -HL Goal 4: Move to chair/commode   -HL Achieved 6: Walk 10 steps or more   Additional Treatment Session   Start Time 1025   End Time 1035   Treatment Assessment Pt requires intermittently less physical assistance to perform sit to stand transfers utilizing proper UE support, but does require verbal instruction to use those surfaces and for forward weight shift.  Patient displayed 1 partially corrected loss of balance during standing tolerance with unilateral upper extremity support.  Skilled PT recommended to progress patient towards treatment goals.   Equipment Use Rolling walker   Additional Treatment Day 1   Exercises   Neuro re-ed Transfers sit to stand from standard toilet with grab bar fluctuate between steadying support and min assist--verbal instruction for proper hand placement and/or stabilizing walker.  Standing tolerance for 2 minutes +3 minutes with intermittent unilateral support, 1 posterior loss of balance noted.  Ambulation with rolling walker additional 8 feet with close supervision.  Patient reports difficulty looking up during transfers and gait due to limited cervical posture   End of Consult   Patient Position at End of Consult   (Handoff to PCA Bakari to assist patient to ambulate to recliner or bed, with alarm prepped)   (Please find full objective findings from PT assessment regarding body systems outlined above).     The patient's AM-PAC Basic Mobility Inpatient Short Form Raw Score is 15. A Raw score of less than or equal to 16 suggests the patient may benefit from discharge to post-acute rehabilitation services, which MAY coincide with CURRENT above PT recommendations depending on the support that the facility will provide upon her  "discharge.     However please refer to therapist recommendation for discharge planning given other factors that may influence destination.     Adapted from Kevon CERDA, Gillian J, Larry J, [place] J. Association of AM-PAC “6-Clicks” Basic Mobility and Daily Activity Scores With Discharge Destination. Physical Therapy, 2021;101:1-9. DOI: [email or web address]    Portions of the record may have been created with voice recognition software.  Occasional wrong word or \"sound a like\" substitutions may have occurred due to the inherent limitations of voice recognition software.  Read the chart carefully and recognize, using context, where substitutions have occurred    "

## 2024-04-09 NOTE — CONSULTS
Podiatry - Consultation    Patient Information:   Elda Maxwell 83 y.o. female MRN: [id number]  Unit/Bed#: W -01 Encounter: [id number]  PCP: Juliette Moon MD  Date of Admission:  4/8/2024  Date of Consultation: 04/09/24  Requesting Physician: Sandy Hammonds MD      ASSESSMENT:    Elda Maxwell is a 83 y.o. female with:    Right foot cellulitis  Lower extremity edema  Stage II chronic kidney disease   Essential hypertension  Hyponatremia    PLAN:    Patient was seen and evaluated at bedside today.  Cellulitis of the right forefoot extending to the level of the midfoot with no open wounds.  Reviewed right foot x-ray, final read: No radiographic evidence of osteomyelitis  Reviewed bilateral lower extremity VAS venous duplex, final read; right lower extremity: No evidence of acute or chronic deep vein thrombosis.  No evidence of superficial thrombophlebitis noted.  Doppler evaluation shows a normal response to augmentation maneuvers.  Popliteal, posterior tibial and anterior tibial arterial Doppler waveforms are biphasic.  Left lower extremity: No evidence of acute or chronic deep vein thrombosis.  No evidence of superficial thrombophlebitis noted.  Doppler evaluation shows a normal response to augmentation maneuvers.  Popliteal, posterior tibial and anterior tibial arterial Doppler waveforms are triphasic.  Continue to monitor IV antibiotics per primary team  Reviewed labs and vitals.  Patient is afebrile with no leukocytosis.  Follow-up final wound culture result.  Preliminary result: 3+ gram-positive rods, 2+ gram-positive cocci in pairs, 1+ gram-positive cocci in chains, 1+ gram-negative rods, rare disintegrating polys.  Given there are no open wounds the to the right foot or within any of the interdigital spaces, reliability of wound culture may be questionable  Elevation on green foam wedges or pillows when non-ambulatory.  Continue offloading of bilateral heels with Mepilex heel pad  Rest  of care per primary team.  Will discuss this plan with my attending and update as needed.    Weightbearing status: Weightbearing as tolerated    SUBJECTIVE:    History of Present Illness:    Elda Maxwell is a 83 y.o. female who is originally admitted 4/8/2024 due to cellulitis. Patient has a past medical history of lower extremity edema, stage II chronic kidney disease, essential hypertension, hyponatremia.    We are consulted for right foot cellulitis.  Patient reports that the redness on her right foot began about 4 days ago.  [facility] resides at [facility].  Patient reports that she has dealt with swelling of her legs chronically for several years.  However, the right leg has become increasingly swollen, red, and tender over the last 4 days.  Patient reports that she has not had any wounds or open lesions to her right foot that she is aware of.  Patient reports that she does not have any numbness or tingling in her feet.  Patient states that she does follow-up with a podiatrist on a regular basis who comes to [facility] and does routine care for her nails and calluses.  Patient denies any fever, chills, nausea, shortness of breath, or vomiting at this time.    Review of Systems:    Constitutional: Negative.    HENT: Negative.    Eyes: Negative.    Respiratory: Negative.    Cardiovascular: Negative.    Gastrointestinal: Negative.    Musculoskeletal: Negative  Skin: Redness and swelling of right foot and leg  Neurological: Negative  Psych: Negative.     Past Medical and Surgical History:      has a past medical history of Acid reflux, Breast cancer (HCC), Breast cancer (HCC), DVT (deep venous thrombosis) ([facility]), Edema, Heart failure (HCC), History of echocardiogram, History of EKG, History of stress test, Hyperlipidemia, Hypertension, Hypertension, Leg edema, Lymphedema, Osteopenia, Peripheral vascular disease (HCC), Scoliosis, Sleep apnea, Thrombocytopenia (HCC), and Vitamin D  deficiency.     Past Surgical History:   Procedure Laterality Date    APPENDECTOMY      BREAST BIOPSY  12/28/2015    CHOLECYSTECTOMY  2009    COLONOSCOPY  10/30/2014    HERNIA REPAIR Right     MAMMO NEEDLE LOCALIZATION RIGHT (ALL INC) Right 1/10/2013    MASTECTOMY Right 2013    MASTECTOMY Right 02/01/2013    TONSILLECTOMY      TONSILLECTOMY      US BREAST NEEDLE LOC LEFT Left 12/28/2015       Meds/Allergies:    Medications Prior to Admission   Medication    Aspirin-Calcium Carbonate  MG TABS    atorvastatin (LIPITOR) 20 mg tablet    Calcium Carbonate-Vitamin D (CALCIUM 500 + D) 500-125 MG-UNIT TABS    Cholecalciferol (VITAMIN D3) 2000 units TABS    docusate sodium (COLACE) 100 mg capsule    fluticasone (FLONASE) 50 mcg/act nasal spray    loratadine (CLARITIN) 10 mg tablet    metoprolol tartrate (LOPRESSOR) 25 mg tablet    Multiple Vitamin (MULTIVITAMIN) tablet    Multiple Vitamins-Minerals (PreserVision AREDS 2) CAPS    nystatin (MYCOSTATIN) powder    potassium chloride (MICRO-K) 10 MEQ CR capsule    sodium chloride (OCEAN) 0.65 % nasal spray    furosemide (LASIX) 20 mg tablet       No Known Allergies    Social History:     Marital Status: Single    Substance Use History:   Social History     Substance and Sexual Activity   Alcohol Use No     Social History     Tobacco Use   Smoking Status Former   Smokeless Tobacco Never   Tobacco Comments    hasn't smoked since highschool age     Social History     Substance and Sexual Activity   Drug Use No       Family History:    Family History   Problem Relation Age of Onset    Stomach cancer Mother     Heart disease Father     Lung cancer Brother     Alzheimer's disease Brother     Parkinsonism Brother     Hypertension Brother     Heart disease Brother     Heart disease Other          OBJECTIVE:    Vitals:   Blood Pressure: 157/93 (04/09/24 0757)  Pulse: 84 (04/09/24 0757)  Temperature: 98.3 °F (36.8 °C) (04/09/24 0757)  Temp Source: Oral (04/08/24 0148)  Respirations:  "16 (04/09/24 0757)  Height: 5' 2\" (157.5 cm) (04/08/24 2126)  Weight - Scale: 82.2 kg (181 lb 5 oz) (04/09/24 0600)  SpO2: 90 % (04/09/24 0757)    Physical Exam:    General Appearance: Alert, cooperative, no distress.  HEENT: Head normocephalic, atraumatic, without obvious abnormality.  Heart: Normal rate and rhythm.  Lungs: Non-labored breathing. No respiratory distress.  Abdomen: Without distension.  Psychiatric: AAOx3  Lower Extremity:  Vascular:   Right DP and PT pulses are nonpalpable secondary to edema but biphasic on Doppler. Left DP and PT pulses are nonpalpable secondary to edema but biphasic on Doppler. CRT < 3 seconds at the digits. +2/4 edema noted at bilateral lower extremities. Pedal hair is absent. Skin temperature is warm to the right foot compared to the left lower extremity.    Musculoskeletal:  MMT is 4/5 in all muscle compartments bilaterally. ROM at the 1st MPJ and ankle joint are reduced bilaterally with the leg extended. Minimal pain on palpation of the right foot and ankle. No gross deformities noted.     Dermatological:    Right lower extremity: Erythema and edema of the forefoot extending proximally to the level of the midfoot with no open lesions noted.  There is no purulent drainage, no crepitus, and no fluctuance at this time.  Diffuse edema of the lower extremity.  There are no open lesions noted to the interdigital spaces, however there is peeling xerotic skin    Neurological:  Gross sensation is intact. Protective sensation is intact. Patient Denies numbness and/or paresthesias.    Clinical Images 04/09/24:                            Additional data:     Lab Results: I have personally reviewed pertinent labs including:    Results from last 7 days   Lab Units 04/09/24  0553   WBC Thousand/uL 6.06   HEMOGLOBIN g/dL 11.8   HEMATOCRIT % 38.5   PLATELETS Thousands/uL 140*   SEGS PCT % 66   LYMPHO PCT % 14   MONO PCT % 16*   EOS PCT % 3     Results from last 7 days   Lab Units " "04/09/24  0553 04/08/24  0557 04/08/24  0303   POTASSIUM mmol/L 3.7   < > 4.4   CHLORIDE mmol/L 101   < > 98   CO2 mmol/L 35*   < > 29   BUN mg/dL 14   < > 17   CREATININE mg/dL 0.70   < > 0.78   CALCIUM mg/dL 8.9   < > 9.1   ALK PHOS U/L  --   --  83   ALT U/L  --   --  10   AST U/L  --   --  22    < > = values in this interval not displayed.     Results from last 7 days   Lab Units 04/08/24  0303   INR  0.92       Cultures: I have personally reviewed pertinent cultures including:    Results from last 7 days   Lab Units 04/08/24  1235 04/08/24  0303   BLOOD CULTURE   --  No Growth at 24 hrs.  No Growth at 24 hrs.   GRAM STAIN RESULT  3+ Gram positive rods*  2+ Gram positive cocci in pairs*  1+ Gram positive cocci in chains*  1+ Gram negative rods*  Rare Disintegrating polys*  --    WOUND CULTURE  Culture too young- will reincubate  --            Imaging: I have personally reviewed pertinent reports in PACS.  EKG, Pathology, and Other Studies: I have personally reviewed pertinent reports.        ** Please Note: Portions of the record may have been created with voice recognition software. Occasional wrong word or \"sound a like\" substitutions may have occurred due to the inherent limitations of voice recognition software. Read the chart carefully and recognize, using context, where substitutions have occurred. **    "

## 2024-04-10 LAB
ANION GAP SERPL CALCULATED.3IONS-SCNC: 8 MMOL/L (ref 4–13)
BASOPHILS # BLD AUTO: 0.01 THOUSANDS/ÂΜL (ref 0–0.1)
BASOPHILS NFR BLD AUTO: 0 % (ref 0–1)
BUN SERPL-MCNC: 17 MG/DL (ref 5–25)
CALCIUM SERPL-MCNC: 8.6 MG/DL (ref 8.4–10.2)
CHLORIDE SERPL-SCNC: 100 MMOL/L (ref 96–108)
CO2 SERPL-SCNC: 32 MMOL/L (ref 21–32)
CREAT SERPL-MCNC: 0.76 MG/DL (ref 0.6–1.3)
EOSINOPHIL # BLD AUTO: 0.18 THOUSAND/ÂΜL (ref 0–0.61)
EOSINOPHIL NFR BLD AUTO: 3 % (ref 0–6)
ERYTHROCYTE [DISTWIDTH] IN BLOOD BY AUTOMATED COUNT: 15.2 % (ref 11.6–15.1)
FLUAV RNA RESP QL NAA+PROBE: NEGATIVE
FLUBV RNA RESP QL NAA+PROBE: NEGATIVE
GFR SERPL CREATININE-BSD FRML MDRD: 71 ML/MIN/1.73SQ M
GLUCOSE P FAST SERPL-MCNC: 96 MG/DL (ref 65–99)
GLUCOSE SERPL-MCNC: 96 MG/DL (ref 65–140)
HCT VFR BLD AUTO: 40.6 % (ref 34.8–46.1)
HGB BLD-MCNC: 12.6 G/DL (ref 11.5–15.4)
IMM GRANULOCYTES # BLD AUTO: 0.03 THOUSAND/UL (ref 0–0.2)
IMM GRANULOCYTES NFR BLD AUTO: 1 % (ref 0–2)
LYMPHOCYTES # BLD AUTO: 0.91 THOUSANDS/ÂΜL (ref 0.6–4.47)
LYMPHOCYTES NFR BLD AUTO: 15 % (ref 14–44)
MCH RBC QN AUTO: 28.3 PG (ref 26.8–34.3)
MCHC RBC AUTO-ENTMCNC: 31 G/DL (ref 31.4–37.4)
MCV RBC AUTO: 91 FL (ref 82–98)
MONOCYTES # BLD AUTO: 0.85 THOUSAND/ÂΜL (ref 0.17–1.22)
MONOCYTES NFR BLD AUTO: 14 % (ref 4–12)
NEUTROPHILS # BLD AUTO: 4.19 THOUSANDS/ÂΜL (ref 1.85–7.62)
NEUTS SEG NFR BLD AUTO: 67 % (ref 43–75)
NRBC BLD AUTO-RTO: 0 /100 WBCS
PLATELET # BLD AUTO: 144 THOUSANDS/UL (ref 149–390)
PMV BLD AUTO: 11.2 FL (ref 8.9–12.7)
POTASSIUM SERPL-SCNC: 3.5 MMOL/L (ref 3.5–5.3)
RBC # BLD AUTO: 4.46 MILLION/UL (ref 3.81–5.12)
RSV RNA RESP QL NAA+PROBE: NEGATIVE
SARS-COV-2 RNA RESP QL NAA+PROBE: NEGATIVE
SODIUM SERPL-SCNC: 140 MMOL/L (ref 135–147)
WBC # BLD AUTO: 6.17 THOUSAND/UL (ref 4.31–10.16)

## 2024-04-10 PROCEDURE — 0241U HB NFCT DS VIR RESP RNA 4 TRGT: CPT

## 2024-04-10 PROCEDURE — 97535 SELF CARE MNGMENT TRAINING: CPT

## 2024-04-10 PROCEDURE — 85025 COMPLETE CBC W/AUTO DIFF WBC: CPT

## 2024-04-10 PROCEDURE — 97167 OT EVAL HIGH COMPLEX 60 MIN: CPT

## 2024-04-10 PROCEDURE — 99232 SBSQ HOSP IP/OBS MODERATE 35: CPT | Performed by: INTERNAL MEDICINE

## 2024-04-10 PROCEDURE — 80048 BASIC METABOLIC PNL TOTAL CA: CPT

## 2024-04-10 RX ORDER — FUROSEMIDE 10 MG/ML
20 INJECTION INTRAMUSCULAR; INTRAVENOUS ONCE
Status: COMPLETED | OUTPATIENT
Start: 2024-04-10 | End: 2024-04-10

## 2024-04-10 RX ORDER — ASPIRIN 81 MG/1
81 TABLET, CHEWABLE ORAL DAILY
Qty: 30 TABLET | Refills: 0 | Status: CANCELLED | OUTPATIENT
Start: 2024-04-11

## 2024-04-10 RX ADMIN — FUROSEMIDE 20 MG: 10 INJECTION, SOLUTION INTRAMUSCULAR; INTRAVENOUS at 11:44

## 2024-04-10 RX ADMIN — CEFAZOLIN SODIUM 2000 MG: 2 SOLUTION INTRAVENOUS at 20:49

## 2024-04-10 RX ADMIN — CEFAZOLIN SODIUM 2000 MG: 2 SOLUTION INTRAVENOUS at 11:47

## 2024-04-10 RX ADMIN — MULTIPLE VITAMINS W/ MINERALS TAB 1 TABLET: TAB ORAL at 08:31

## 2024-04-10 RX ADMIN — CEFAZOLIN SODIUM 2000 MG: 2 SOLUTION INTRAVENOUS at 04:41

## 2024-04-10 RX ADMIN — FUROSEMIDE 60 MG: 20 TABLET ORAL at 08:31

## 2024-04-10 RX ADMIN — ASPIRIN 81 MG CHEWABLE TABLET 81 MG: 81 TABLET CHEWABLE at 08:32

## 2024-04-10 RX ADMIN — METOPROLOL TARTRATE 12.5 MG: 25 TABLET, FILM COATED ORAL at 08:31

## 2024-04-10 RX ADMIN — ATORVASTATIN CALCIUM 20 MG: 40 TABLET, FILM COATED ORAL at 17:59

## 2024-04-10 RX ADMIN — ENOXAPARIN SODIUM 40 MG: 40 INJECTION SUBCUTANEOUS at 08:31

## 2024-04-10 RX ADMIN — FUROSEMIDE 60 MG: 20 TABLET ORAL at 17:59

## 2024-04-10 NOTE — ASSESSMENT & PLAN NOTE
Lab Results   Component Value Date    EGFR 71 04/10/2024    EGFR 79 04/09/2024    EGFR 80 04/08/2024    CREATININE 0.76 04/10/2024    CREATININE 0.70 04/09/2024    CREATININE 0.68 04/08/2024   Avoid hypotension and nephrotoxic medications

## 2024-04-10 NOTE — DISCHARGE INSTR - AVS FIRST PAGE
Dear Elda Maxwell,     It was our pleasure to care for you here at [facility].  It is our hope that we were always able to exceed the expected standards for your care during your stay.  You were hospitalized due to infection of your foot.  You were cared for on the MedSurg floor by Matthew Gordon MD under the service of Sandy Hammonds MD with the [facility] who covers for your primary care physician (PCP), Juliette Moon MD, while you were hospitalized.  If you have any questions or concerns related to this hospitalization, you may contact us at .  For follow up as well as any medication refills, we recommend that you follow up with your primary care physician.  A registered nurse will reach out to you by phone within a few days after your discharge to answer any additional questions that you may have after going home.  However, at this time we provide for you here, the most important instructions / recommendations at discharge:     Notable Medication Adjustments -   Please start taking Keflex every 6 hours for the next 4 days.  Testing Required after Discharge -   None  ** Please contact your PCP to request testing orders for any of the testing recommended here **  Important follow up information -   Please see your PCP within 1 week of discharge  Please use compression stockings to help with your lymphedema.  In case of worsening symptoms or development of new symptoms such as fever, chills, malaise please see a provider in the ED.  Other Instructions -   None  Please review this entire after visit summary as additional general instructions including medication list, appointments, activity, diet, any pertinent wound care, and other additional recommendations from your care team that may be provided for you.      Sincerely,     Matthew Gordon MD

## 2024-04-10 NOTE — PROGRESS NOTES
[facility]  Progress Note  Name: Elda Maxwell I  MRN: [id number]  Unit/Bed#: W -01 I Date of Admission: 4/8/2024   Date of Service: 4/10/2024 I Hospital Day: 0    Assessment/Plan   * Cellulitis  Assessment & Plan  POA from [facility] with extensive lower extremities erythema (see imaging in media)  H/o of chronic leg swelling due to lymphedema   Hemodynamically stable, not meeting SIRS criteria  Blood work gossly unremarkable  Erythema on the dorsum of the foot is getting better.    Plan:  Monitor vitals and fever curve  Follow am cbc  IV cefazolin    CKD (chronic kidney disease) stage 2, GFR 60-89 ml/min  Assessment & Plan  Lab Results   Component Value Date    EGFR 71 04/10/2024    EGFR 79 04/09/2024    EGFR 80 04/08/2024    CREATININE 0.76 04/10/2024    CREATININE 0.70 04/09/2024    CREATININE 0.68 04/08/2024   Avoid hypotension and nephrotoxic medications    Leg swelling  Assessment & Plan  Chronic lymphedema  Compression stockings  Additional dose of IV Lasix 20 mg    Essential hypertension  Assessment & Plan  Continue home dose of furosemide 60 mg twice daily, Lopressor 12.5 mg daily           VTE Pharmacologic Prophylaxis: VTE Score: 5 High Risk (Score >/= 5) - Pharmacological DVT Prophylaxis Ordered: enoxaparin (Lovenox). Sequential Compression Devices Ordered.    Mobility:   Basic Mobility Inpatient Raw Score: 15  JH-HLM Goal: 4: Move to chair/commode  JH-HLM Achieved: 1: Laying in bed  JH-HLM Goal NOT achieved. Continue with multidisciplinary rounding and encourage appropriate mobility to improve upon JH-HLM goals.    Patient Centered Rounds: I performed bedside rounds with nursing staff today.  Discussions with Specialists or Other Care Team Provider: None    Education and Discussions with Family / Patient: Attempted to update  (other) via phone. Unable to contact.    Current Length of Stay: 0 day(s)  Current Patient Status: Inpatient    Discharge Plan: Anticipate discharge tomorrow to rehab facility.    Code Status: Level 3 - DNAR and DNI    Subjective:   Patient subjectively feels fine.  She mentions that the pain has substantially reduced and is not present on rest anymore.  She denies any headaches, shortness of breath, palpitations, pain abdomen, diarrhea, constipation, nausea, vomiting, symptoms suggestive of LUTS.    Objective:     Vitals:   Temp (24hrs), Av.6 °F (37 °C), Min:98.5 °F (36.9 °C), Max:98.6 °F (37 °C)    Temp:  [98.5 °F (36.9 °C)-98.6 °F (37 °C)] 98.6 °F (37 °C)  HR:  [66-88] 66  Resp:  [17-18] 17  BP: (115-154)/(65-88) 115/65  SpO2:  [91 %-94 %] 92 %  Body mass index is 33.16 kg/m².     Input and Output Summary (last 24 hours):     Intake/Output Summary (Last 24 hours) at 4/10/2024 1505  Last data filed at 4/10/2024 1228  Gross per 24 hour   Intake 620 ml   Output --   Net 620 ml       Physical Exam:   Physical Exam  Vitals and nursing note reviewed.   Constitutional:       General: She is not in acute distress.     Appearance: She is well-developed.   HENT:      Head: Normocephalic and atraumatic.   Eyes:      Conjunctiva/sclera: Conjunctivae normal.   Cardiovascular:      Rate and Rhythm: Normal rate and regular rhythm.      Heart sounds: No murmur heard.  Pulmonary:      Effort: Pulmonary effort is normal. No respiratory distress.      Breath sounds: Normal breath sounds.   Abdominal:      Palpations: Abdomen is soft.      Tenderness: There is no abdominal tenderness.   Musculoskeletal:         General: No swelling.      Cervical back: Neck supple.   Feet:      Right foot:      Skin integrity: Warmth present.      Comments: Tenderness, erythema, warmth on right foot  Skin:     General: Skin is warm and dry.      Capillary Refill: Capillary refill takes less than 2 seconds.   Neurological:      Mental Status: She is alert.   Psychiatric:         Mood and Affect: Mood normal.          Additional Data:     Labs:  Results  from last 7 days   Lab Units 04/10/24  0519   WBC Thousand/uL 6.17   HEMOGLOBIN g/dL 12.6   HEMATOCRIT % 40.6   PLATELETS Thousands/uL 144*   SEGS PCT % 67   LYMPHO PCT % 15   MONO PCT % 14*   EOS PCT % 3     Results from last 7 days   Lab Units 04/10/24  0519 04/08/24  0557 04/08/24  0303   SODIUM mmol/L 140   < > 133*   POTASSIUM mmol/L 3.5   < > 4.4   CHLORIDE mmol/L 100   < > 98   CO2 mmol/L 32   < > 29   BUN mg/dL 17   < > 17   CREATININE mg/dL 0.76   < > 0.78   ANION GAP mmol/L 8   < > 6   CALCIUM mg/dL 8.6   < > 9.1   ALBUMIN g/dL  --   --  3.8   TOTAL BILIRUBIN mg/dL  --   --  0.60   ALK PHOS U/L  --   --  83   ALT U/L  --   --  10   AST U/L  --   --  22   GLUCOSE RANDOM mg/dL 96   < > 98    < > = values in this interval not displayed.     Results from last 7 days   Lab Units 04/08/24  0303   INR  0.92             Results from last 7 days   Lab Units 04/08/24  0303   LACTIC ACID mmol/L 1.2   PROCALCITONIN ng/ml <0.05       Lines/Drains:  Invasive Devices       Peripheral Intravenous Line  Duration             Peripheral IV 04/08/24 Left;Ventral (anterior) Forearm 2 days                          Imaging: No pertinent imaging reviewed.    Recent Cultures (last 7 days):   Results from last 7 days   Lab Units 04/08/24  1235 04/08/24  0303   BLOOD CULTURE   --  No Growth at 48 hrs.  No Growth at 48 hrs.   GRAM STAIN RESULT  3+ Gram positive rods*  2+ Gram positive cocci in pairs*  1+ Gram positive cocci in chains*  1+ Gram negative rods*  Rare Disintegrating polys*  --    WOUND CULTURE  Culture too young- will reincubate  --        Last 24 Hours Medication List:   Current Facility-Administered Medications   Medication Dose Route Frequency Provider Last Rate    acetaminophen  650 mg Oral Q6H PRN Kari Blancas MD      aspirin  81 mg Oral Daily Kari Blancas MD      atorvastatin  20 mg Oral Daily With Dinner Kari Blancas MD      cefazolin  2,000 mg Intravenous Q8H Matthew  MD Evan 2,000 mg (04/10/24 1147)    enoxaparin  40 mg Subcutaneous Daily Kari Blancas MD      furosemide  60 mg Oral BID (diuretic) Kari Blancas MD      metoprolol tartrate  12.5 mg Oral Daily Kari Blancas MD      multivitamin-minerals  1 tablet Oral Daily Kari Blancas MD          Today, Patient Was Seen By: Matthew Gordon MD    **Please Note: This note may have been constructed using a voice recognition system.**

## 2024-04-10 NOTE — PLAN OF CARE
Problem: Prexisting or High Potential for Compromised Skin Integrity  Goal: Skin integrity is maintained or improved  Description: INTERVENTIONS:  - Identify patients at risk for skin breakdown  - Assess and monitor skin integrity  - Assess and monitor nutrition and hydration status  - Monitor labs   - Assess for incontinence   - Turn and reposition patient  - Assist with mobility/ambulation  - Relieve pressure over bony prominences  - Avoid friction and shearing  - Provide appropriate hygiene as needed including keeping skin clean and dry  - Evaluate need for skin moisturizer/barrier cream  - Collaborate with interdisciplinary team   - Patient/family teaching  - Consider wound care consult   Outcome: Progressing     Problem: PAIN - ADULT  Goal: Verbalizes/displays adequate comfort level or baseline comfort level  Description: Interventions:  - Encourage patient to monitor pain and request assistance  - Assess pain using appropriate pain scale  - Administer analgesics based on type and severity of pain and evaluate response  - Implement non-pharmacological measures as appropriate and evaluate response  - Consider cultural and social influences on pain and pain management  - Notify physician/advanced practitioner if interventions unsuccessful or patient reports new pain  Outcome: Progressing     Problem: INFECTION - ADULT  Goal: Absence or prevention of progression during hospitalization  Description: INTERVENTIONS:  - Assess and monitor for signs and symptoms of infection  - Monitor lab/diagnostic results  - Monitor all insertion sites, i.e. indwelling lines, tubes, and drains  - Monitor endotracheal if appropriate and nasal secretions for changes in amount and color  - [place] appropriate cooling/warming therapies per order  - Administer medications as ordered  - Instruct and encourage patient and family to use good hand hygiene technique  - Identify and instruct in appropriate isolation precautions for  identified infection/condition  Outcome: Progressing     Problem: DISCHARGE PLANNING  Goal: Discharge to home or other facility with appropriate resources  Description: INTERVENTIONS:  - Identify barriers to discharge w/patient and caregiver  - Arrange for needed discharge resources and transportation as appropriate  - Identify discharge learning needs (meds, wound care, etc.)  - Arrange for interpretive services to assist at discharge as needed  - Refer to Case Management Department for coordinating discharge planning if the patient needs post-hospital services based on physician/advanced practitioner order or complex needs related to functional status, cognitive ability, or social support system  Outcome: Progressing     Problem: Knowledge Deficit  Goal: Patient/family/caregiver demonstrates understanding of disease process, treatment plan, medications, and discharge instructions  Description: Complete learning assessment and assess knowledge base.  Interventions:  - Provide teaching at level of understanding  - Provide teaching via preferred learning methods  Outcome: Progressing     Problem: Potential for Falls  Goal: Patient will remain free of falls  Description: INTERVENTIONS:  - Educate patient/family on patient safety including physical limitations  - Instruct patient to call for assistance with activity   - Consult OT/PT to assist with strengthening/mobility   - Keep Call bell within reach  - Keep bed low and locked with side rails adjusted as appropriate  - Keep care items and personal belongings within reach  - Initiate and maintain comfort rounds  - Make Fall Risk Sign visible to staff  - Offer Toileting every 2 Hours, in advance of need  - Initiate/Maintain bed alarm  - Obtain necessary fall risk management equipment: bed alarm, yellow socks/bracelet  - Apply yellow socks and bracelet for high fall risk patients  - Consider moving patient to room near nurses station  Outcome: Progressing

## 2024-04-10 NOTE — CASE MANAGEMENT
[facility] [place] has received APPROVED authorization.  Insurance:   [facility]  Authorization received for: SNF  Facility:  [facility]   Authorization #: [id number]   Start of Care: 4/10  Next Review Date: 4/17  Continued Stay Care Coordinator:  none given  Submit next review to: [phone]     Care Manager notified: Ree Daniels

## 2024-04-10 NOTE — PROGRESS NOTES
Patient:  MITUL FRANCIS    MRN:  [id number]    Aidin Request ID:  [id number]    Level of care reserved:  Skilled Nursing Facility    Partner Reserved:  [facility] [place], [place] [address] [phone]    Clinical needs requested:    Geography searched:  10 miles around [id number]    Start of Service:    Request sent:  12:08pm EDT on 4/10/2024 by Ree Daniels    Partner reserved:  1:21pm EDT on 4/10/2024 by Ree Daniels    Choice list shared:

## 2024-04-10 NOTE — CASE MANAGEMENT
[facility] received request for authorization from Care Manager.  Authorization request for: SNF  Facility Name: [facility]  NPI: [id number]  Facility MD:  Dr. Flores   NPI: [id number]  Authorization initiated by contacting insurance:  [facility]  Via: [employer]   Clinicals submitted via: [employer] attachment   Pending Reference #: [id number]    Care Manager notified:  Ree Daniels

## 2024-04-10 NOTE — PLAN OF CARE
Problem: OCCUPATIONAL THERAPY ADULT  Goal: Performs self-care activities at highest level of function for planned discharge setting.  See evaluation for individualized goals.  Description: Treatment Interventions: ADL retraining, Functional transfer training, UE strengthening/ROM, Endurance training, Cognitive reorientation, Patient/family training, Activityengagement, Continued evaluation, Compensatory technique education          See flowsheet documentation for full assessment, interventions and recommendations.   Outcome: Progressing  Note: Limitation: Decreased ADL status, Decreased Safe judgement during ADL, Decreased cognition, Decreased self-care trans, Decreased high-level ADLs (impaired balance, fxnl mobility, act dulce maria, fxnl reach, standing dulce maria, strength, attention to task, safety awareness, insight, problem solving, learning new tasks)  Prognosis: Good  Assessment: Pt is a 83 y.o. female seen for OT evaluation s/p admission to [facility] on 4/8/2024  due to worsening swelling of (B)feet and redness on the top of right foot. Pt diagnosis: Cellulitis. Pt's PMH impacting occupational performance is listed above. Pt's PLOF also listed in above flowsheet. Pt is highly motivated to return to home ([facility]). During evaluation pt performed as is outlined above in flowsheet.  Standardized assessments used to assist in identifying performance deficits include [facility]. Pt's raw score on the AM-PAC Daily activity inpatient short form is 21, standardized score is 44.27, which is greater than 39.4. Patients at this level are likely to benefit from DC to home however this writer recommends inc'd supports for ADLs, ADL transfers, and functional mobility therefore should facility be unable to provide additional supports, recommend higher level of care at this time. Performance deficits that affect the pt’s occupational performance can be seen above. Due to pt's current functional limitations and  medical complications, pt is functioning below baseline level of independence. Pt would benefit from continued skilled OT treatment in order to maximize safety, independence and overall performance with ADLs, functional mobility, functional transfers, cognition, and leisure participation  in order to achieve highest level of function. Based on functional performance deficits and assessments, this evaluation is identified as a high complexity evaluation.     Rehab Resource Intensity Level, OT: II (Moderate Resource Intensity) (vs. III if facility able to assist pt at pt's current level of assistance for ADLs & ADL transfers)

## 2024-04-10 NOTE — ASSESSMENT & PLAN NOTE
POA from [facility] with extensive lower extremities erythema (see imaging in media)  H/o of chronic leg swelling due to lymphedema   Hemodynamically stable, not meeting SIRS criteria  Blood work gossly unremarkable  Erythema on the dorsum of the foot is getting better.    Plan:  Monitor vitals and fever curve  Follow am cbc  IV cefazolin

## 2024-04-10 NOTE — CASE MANAGEMENT
Case Management Discharge Planning Note    Patient name Elad Maxwell  Location W /W -01 MRN [id number]  : 1940 Date 4/10/2024       Current Admission Date: 2024  Current Admission Diagnosis:Cellulitis   Patient Active Problem List    Diagnosis Date Noted    Cellulitis 2024    CKD (chronic kidney disease) stage 2, GFR 60-89 ml/min 2021    Acute respiratory failure with hypoxia (HCC) 06/15/2021    Leg swelling 06/15/2021    Claustrophobia 2018    Periorbital edema 2018    Dry mouth 2018    DAVE (obstructive sleep apnea) 2018    Hypersomnia 2018    Insufficient sleep syndrome 2018    Obesity (BMI 30-39.9) 2018    Essential hypertension 2018    Localized swelling of both lower legs 2018      LOS (days): 0  Geometric Mean LOS (GMLOS) (days): 3.2  Days to GMLOS:2.9     OBJECTIVE:  Risk of Unplanned Readmission Score: 8.89         Current admission status: Inpatient   Preferred Pharmacy:   [id number] - [place], [place] - [address]  [address] [address]  Phone: [phone] Fax: [phone]    [id number] - [place], [place] - [address]  [address]  [place] [address]  Phone: [phone] Fax: [phone]    [facility] [place] - [address] [facility]  [address] [place] [address] [place] [address]  Phone: [phone] Fax: [phone]    [facility] [place], [place] - [address] [facility]  [address] [place] [address]  Phone: [phone] Fax: [phone]    Primary Care Provider: Juliette Moon MD    Primary Insurance: BLUE CROSS  REP  Secondary Insurance:     DISCHARGE DETAILS:    Discharge planning discussed with:: POA  Freedom of Choice: Yes  Comments - Freedom of Choice: CM contacted [facility] assisted [facility] where pt is from to obtain contact information as CM was not able to reach contact.  CM received POA information and contacted  Jo-Ann.  She requested her information be placed in chart.  This has been done.  CM reviewed with POA the care team recommendations for rehab prior to returning to AL.  CM explained the facility is in agreement with this as well.  POA would like for pt to go to [facility] for rehab.  CM contacted family/caregiver?: Yes  Were Treatment Team discharge recommendations reviewed with patient/caregiver?: Yes  Did patient/caregiver verbalize understanding of patient care needs?: Yes  Were patient/caregiver advised of the risks associated with not following Treatment Team discharge recommendations?: Yes    Contacts  Patient Contacts: Jo-Ann Purvis tiffany  Relationship to Patient:: Family  Contact Method: Phone  Reason/Outcome: Emergency Contact, Continuity of Care, Referral, Discharge Planning    Requested Home Health Care         Is the patient interested in [facility] at discharge?: No    DME Referral Provided  Referral made for DME?: No    Other Referral/Resources/Interventions Provided:  Interventions: Short Term Rehab  Referral Comments: Referral has been made to [facility] for rehab.  They are able to accept pt.  Auth has been submitted.  Pt is stable for DC.         Treatment Team Recommendation: Short Term Rehab  Discharge Destination Plan:: Short Term Rehab

## 2024-04-10 NOTE — CASE MANAGEMENT
Case Management Discharge Planning Note    Patient name Elda Maxwell  Location W /W -01 MRN [id number]  : 1940 Date 4/10/2024       Current Admission Date: 2024  Current Admission Diagnosis:Cellulitis   Patient Active Problem List    Diagnosis Date Noted    Cellulitis 2024    CKD (chronic kidney disease) stage 2, GFR 60-89 ml/min 2021    Acute respiratory failure with hypoxia (HCC) 06/15/2021    Leg swelling 06/15/2021    Claustrophobia 2018    Periorbital edema 2018    Dry mouth 2018    DAVE (obstructive sleep apnea) 2018    Hypersomnia 2018    Insufficient sleep syndrome 2018    Obesity (BMI 30-39.9) 2018    Essential hypertension 2018    Localized swelling of both lower legs 2018      LOS (days): 0  Geometric Mean LOS (GMLOS) (days): 3.2  Days to GMLOS:2.9     OBJECTIVE:  Risk of Unplanned Readmission Score: 8.91         Current admission status: Inpatient   Preferred Pharmacy:   [id number] - [place] - [address]  [address] [address]  Phone: [phone] Fax: [phone]    [id number] - [place], [place] - [address] [facility]  [address] [facility] [facility] [place] [address]  Phone: [phone] Fax: [phone]    [facility] [place] - [address] [facility]  [address] [place] [address] [place] [address]  Phone: [phone] Fax: [phone]    [facility] [place] - [address] [facility]  [address] [facility] [place] [address]  Phone: [phone] Fax: [phone]    Primary Care Provider: Juliette Moon MD    Primary Insurance: BLUE CROSS [facility] REP  Secondary Insurance:     DISCHARGE DETAILS:                                                                                                               Facility Insurance Auth Number: [id number]

## 2024-04-10 NOTE — OCCUPATIONAL THERAPY NOTE
Occupational Therapy Evaluation      Elda Maxwell    4/10/2024    Principal Problem:    Cellulitis  Active Problems:    Essential hypertension    Leg swelling    CKD (chronic kidney disease) stage 2, GFR 60-89 ml/min      Past Medical History:   Diagnosis Date    Acid reflux     Breast cancer (HCC)     Armidex    Breast cancer (HCC)     DVT (deep venous thrombosis) ([facility]) 07/2016    subacute/chronic dvt on the right side in     Edema     chronic leg edema, PSBO 12/31/2015    Heart failure (HCC)     History of echocardiogram 07/01/2016    Transthoracic Echocardiogram 7/1/16: LV normal size left ventricle. no LVH. normal global systolic LV function (EF 61%). no gross regional wall motion abnormality. normal diastolic LV function. RV normal size. no RVH . normal RV function. Left atrium is normal in size. normal appearing atrial septum. Right atrium is normal in size. normal appearing atrial septum. mitral valve has mild thickenin    History of EKG 11/01/2017    *EKG 11/1/17: normal sinus rhythm; RBBB, left anterior fascicular block, bifascicular block; moderate voltage criteria for LVH, may be normal variant, abnormal ECGEKG 5/26/17: normal sinus rhythm, RBBB, left anterior fascicular block, bifascicular block; moderate voltage criteria for LVH, may be normal variant; abnormal ECG.EKG 3/1/17: normal sinus rhythm; RBBB, left anterior fascicular bloc    History of stress test 04/25/2014    Adenosine Cardiolite Stress Test 4/25/14: EKG portion- 1) the stress test was inconclusive for myocardial ischemia due to baseline EKG abnormalities 2) physiologic response to adenosine infusion 3) cardiolite scan is attached. Conclusion: Very mildly abnormal Adenosine Cardiolite SPECT study. there is a small fixed mild defect in the left ventricular apex. there is no reversible ischemia. diffe    Hyperlipidemia     Hypertension     Hypertension     Leg edema     chronic     Lymphedema     Osteopenia     Peripheral vascular  "disease (HCC)     Scoliosis     Sleep apnea     Thrombocytopenia (HCC)     Vitamin D deficiency        Past Surgical History:   Procedure Laterality Date    APPENDECTOMY      BREAST BIOPSY  12/28/2015    CHOLECYSTECTOMY  2009    COLONOSCOPY  10/30/2014    HERNIA REPAIR Right     MAMMO NEEDLE LOCALIZATION RIGHT (ALL INC) Right 1/10/2013    MASTECTOMY Right 2013    MASTECTOMY Right 02/01/2013    TONSILLECTOMY      TONSILLECTOMY      US BREAST NEEDLE LOC LEFT Left 12/28/2015        04/10/24 1235   OT Last Visit   OT Visit Date 04/10/24   Note Type   Note type Evaluation   Additional Comments OT orders received, chart reviewed. RN cleared pt for OT evaluation.   Pain Assessment   Pain Assessment Tool 0-10   Pain Score No Pain   Restrictions/Precautions   Weight Bearing Precautions Per Order No   Other Precautions Bed Alarm;Chair Alarm;Cognitive;Fall Risk;Multiple lines   Home Living   Type of Home Assisted living  ([facility])   Home Layout One level   Bathroom Shower/Tub Walk-in shower   Bathroom Toilet Raised   Bathroom Equipment Grab bars in shower;Grab bars around toilet   Home Equipment Walker  (with a tray)   Prior Function   Level of [place] Independent with functional mobility  (mod (I) with functional mobility)   Lives With Facility staff   IADLs Family/Friend/Other provides medication management;Family/Friend/Other provides meals;Family/Friend/Other provides transportation   Falls in the last 6 months 0  (Pt reports \"no falls thank goodness! 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 Pt also reports dressing self, (I) with toileting. (A) only for bathing.   Reciprocal Relationships supportive facility staff.   Intrinsic Gratification \"I used to enjoy playing golf\", \"we have people who prepare games and things and you can join if you " "want...about half of the time I join.\", \"Exercise class everyday.\"   General   Family/Caregiver Present No  (pt reports \"the person that takes care of my business is my niece but they moved to [place]. I have a nephew that lives on the other side of [place].\")   Subjective   Subjective \"Do you want to see my get around so that you can get my out of here?\"   ADL   Eating Assistance 7  Independent   Grooming Assistance 7  Independent   UB Bathing Assistance 5  Supervision/Setup   LB Bathing Assistance 4  Minimal Assistance   UB Dressing Assistance 5  Supervision/Setup   LB Dressing Assistance 4  Minimal Assistance   LB Dressing Deficit Steadying  (assistance needed for ZULEIKA stockings.)   Toileting Assistance  4  Minimal Assistance  (/CGA)   Bed Mobility   Additional Comments Pt presents OOB in chair upon OT arrival and returned to chair at the end of session.   Transfers   Sit to Stand 4  Minimal assistance  (CGA ONLY at start of session. PROGRESSED TO CLOSE (S))   Additional items Assist x 1;Increased time required;Armrests   Stand to Sit 5  Supervision   Additional items Assist x 1;Increased time required;Armrests   Toilet transfer 4  Minimal assistance  (progressed to close (S))   Functional Mobility   Functional Mobility 4  Minimal assistance  (progressed to close (S))   Additional Comments inc'd time, forward flexed posture, use of RW   Balance   Static Sitting Good   Dynamic Sitting Good   Static Standing Fair -  (observed pt standing unsupported during ADL task without LOB; CGA initially for safety but progressed to close (S).)   Dynamic Standing   (did not assess)   Activity Tolerance   Activity Tolerance Patient tolerated treatment well   Nurse Made Aware RN cleared pt for OT evaluation and aware of pt's tolerance of session.   RUE Assessment   RUE Assessment   (dec'd ROM of shoulders but grossly WFL for strength and ROM to manage basic self cares.)   LUE Assessment   LUE Assessment   (same as above.)   Sensation " "  Additional Comments sensitive skin (B)LE's, dressings on (B) feet. Denies numbness/tingling of LE's or (B)UE's.   Vision-Basic Assessment   Current Vision Wears glasses only for reading  (however not with pt in hospital)   Vision - Complex Assessment   Acuity Able to read clock/calendar on wall without difficulty   Cognition   Overall Cognitive Status Impaired   Arousal/Participation Alert;Cooperative   Attention Attends with cues to redirect   Orientation Level Oriented to person;Oriented to place  (general time awareness- aware of month and exact date however states, \"\" for year and reports \"numbers always mix me up.\")   Following Commands Follows one step commands with increased time or repetition   Comments Pt verified ID BY stating name and . Pt unable to read the clock on wall- able to ID the purposeful of \"the little hand\" and \"the big hand\" but unable to provide accurate reading of clock.   Assessment   Limitation Decreased ADL status;Decreased Safe judgement during ADL;Decreased cognition;Decreased self-care trans;Decreased high-level ADLs  (impaired balance, fxnl mobility, act dulce maria, fxnl reach, standing dulce maria, strength, attention to task, safety awareness, insight, problem solving, learning new tasks)   Prognosis Good   Assessment Pt is a 83 y.o. female seen for OT evaluation s/p admission to [facility] on 2024  due to worsening swelling of (B)feet and redness on the top of right foot. Pt diagnosis: Cellulitis. Pt's PMH impacting occupational performance is listed above. Pt's PLOF also listed in above flowsheet. Pt is highly motivated to return to home ([facility]). During evaluation pt performed as is outlined above in flowsheet.  Standardized assessments used to assist in identifying performance deficits include AMPAC 6-Clicks. Pt's raw score on the AM-PAC Daily activity inpatient short form is 21, standardized score is 44.27, which is greater than 39.4. Patients at this level are " "likely to benefit from DC to home however this writer recommends inc'd supports for ADLs, ADL transfers, and functional mobility therefore should facility be unable to provide additional supports, recommend higher level of care at this time. Performance deficits that affect the pt’s occupational performance can be seen above. Due to pt's current functional limitations and medical complications, pt is functioning below baseline level of independence. Pt would benefit from continued skilled OT treatment in order to maximize safety, independence and overall performance with ADLs, functional mobility, functional transfers, cognition, and leisure participation  in order to achieve highest level of function. Based on functional performance deficits and assessments, this evaluation is identified as a high complexity evaluation.   Goals   Patient Goals \"To keep alive and to be able to help other people.\"   LTG Time Frame 7-10   Long Term Goal #1 see goals listed below   Plan   Treatment Interventions ADL retraining;Functional transfer training;UE strengthening/ROM;Endurance training;Cognitive reorientation;Patient/family training;Activityengagement;Continued evaluation;Compensatory technique education   Goal Expiration Date 04/20/24   OT Treatment Day 1   OT Frequency 3-5x/wk   Discharge Recommendation   Rehab Resource Intensity Level, OT II (Moderate Resource Intensity) vs. III if facility able to assist pt at pt's current level of assistance for ADLs & ADL transfers   AM-PAC Daily Activity Inpatient   Lower Body Dressing 3   Bathing 3   Toileting 3   Upper Body Dressing 4   Grooming 4   Eating 4   Daily Activity Raw Score 21   Daily Activity Standardized Score (Calc for Raw Score >=11) 44.27   AM-PAC Applied Cognition Inpatient   Following a Speech/Presentation 2   Understanding Ordinary Conversation 3   Taking Medications 3   Remembering Where Things Are Placed or Put Away 3   Remembering List of 4-5 Errands 2   Taking " "Care of Complicated Tasks 2   Applied Cognition Raw Score 15   Applied Cognition Standardized Score 33.54   Additional Treatment Session   Start Time 1300   End Time 1333   Treatment Assessment Pt seen at bedside for OT evaluation and tx session. Tx session consisting of education re: transfer safety- discussed proper hand placement, for which pt was able to return demonstration of this. OT discussed appropriate activity level during stay- encouraged pt to request mobilizing with staff since pt mobilizes often in facility-  to work toward returning to PLOF. RN required to don ZULEIKA stockings prior to mobilizing. OT educated pt (and RN) on a technique to don TEDS to decrease burden and difficulty of task- placing a plastic bag over pt's toe in order for stocking to glide over skin with greater ease. Pt appreciative of this new technique. Instructed pt on importance of skin checks and to alert nursing of any changes in skin color and/or sensation. Also instructed on importance of avoiding \"wrinkles\" or \"rolling at the top\" of stocking. RN to look into thich high stockings after OT noting that LLE stocking frequently rolling down. SIt<>stand progressing to close (S) during session. Education re: proper positioning for optimal comfort due to severe kyphotic/curved posture. Discussed use of a cervical pillow backwards or using a towel/blanket roll to prop head/neck for comfort. Pt verablized understanding and appreciative of the blankets provided for support of head/neck. Pt would benefit from continued OT services to address goals and deficits listed in evaluation.   End of Consult   Education Provided Yes   Patient Position at End of Consult All needs within reach;Bedside chair   Nurse Communication Nurse aware of consult     GOALS:    Pt will achieve the following within specified time frame:  *Perform ADL transfers at (S) level for inc'd independence with ADLs/purposeful tasks    *Bed mobility- (S) for inc'd " independence to manage own comfort and initiate EOB & OOB purposeful tasks    *Perform UB ADL at (I) level for inc'd independence with self cares    *Perform LB ADL at (S) level using AE prn for inc'd independence with self cares    *Increase static stand balance to Fair with support for inc'd safety with standing purposeful tasks    *Increase stand tolerance x7-10m for inc'd tolerance with standing purposeful tasks    *Perform clothing management/hygiene for toileting at (S) level for inc'd independence with self cares    *Participate in 10m UE therex to increase overall stamina/activity tolerance for purposeful tasks    *Participate in further cognitive testing to assist with safe d/c planning    *Perform sinkside G&H with (S), with good safety and Fair balance for inc'd independence with self cares    *Pt will verbalize 2-3 energy conservation techniques to utilize in order to complete purposeful/ADL tasks safey, at highest level of performance and independence and with self report of dec'd fatigue.    Thank you  Hung Chaudhry, OT

## 2024-04-10 NOTE — PLAN OF CARE
Problem: Prexisting or High Potential for Compromised Skin Integrity  Goal: Skin integrity is maintained or improved  Description: INTERVENTIONS:  - Identify patients at risk for skin breakdown  - Assess and monitor skin integrity  - Assess and monitor nutrition and hydration status  - Monitor labs   - Assess for incontinence   - Turn and reposition patient  - Assist with mobility/ambulation  - Relieve pressure over bony prominences  - Avoid friction and shearing  - Provide appropriate hygiene as needed including keeping skin clean and dry  - Evaluate need for skin moisturizer/barrier cream  - Collaborate with interdisciplinary team   - Patient/family teaching  - Consider wound care consult   Outcome: Progressing     Problem: PAIN - ADULT  Goal: Verbalizes/displays adequate comfort level or baseline comfort level  Description: Interventions:  - Encourage patient to monitor pain and request assistance  - Assess pain using appropriate pain scale  - Administer analgesics based on type and severity of pain and evaluate response  - Implement non-pharmacological measures as appropriate and evaluate response  - Consider cultural and social influences on pain and pain management  - Notify physician/advanced practitioner if interventions unsuccessful or patient reports new pain  Outcome: Progressing     Problem: INFECTION - ADULT  Goal: Absence or prevention of progression during hospitalization  Description: INTERVENTIONS:  - Assess and monitor for signs and symptoms of infection  - Monitor lab/diagnostic results  - Monitor all insertion sites, i.e. indwelling lines, tubes, and drains  - Monitor endotracheal if appropriate and nasal secretions for changes in amount and color  - [place] appropriate cooling/warming therapies per order  - Administer medications as ordered  - Instruct and encourage patient and family to use good hand hygiene technique  - Identify and instruct in appropriate isolation precautions for  identified infection/condition  Outcome: Progressing     Problem: DISCHARGE PLANNING  Goal: Discharge to home or other facility with appropriate resources  Description: INTERVENTIONS:  - Identify barriers to discharge w/patient and caregiver  - Arrange for needed discharge resources and transportation as appropriate  - Identify discharge learning needs (meds, wound care, etc.)  - Arrange for interpretive services to assist at discharge as needed  - Refer to Case Management Department for coordinating discharge planning if the patient needs post-hospital services based on physician/advanced practitioner order or complex needs related to functional status, cognitive ability, or social support system  Outcome: Progressing     Problem: Knowledge Deficit  Goal: Patient/family/caregiver demonstrates understanding of disease process, treatment plan, medications, and discharge instructions  Description: Complete learning assessment and assess knowledge base.  Interventions:  - Provide teaching at level of understanding  - Provide teaching via preferred learning methods  Outcome: Progressing     Problem: Potential for Falls  Goal: Patient will remain free of falls  Description: INTERVENTIONS:  - Educate patient/family on patient safety including physical limitations  - Instruct patient to call for assistance with activity   - Consult OT/PT to assist with strengthening/mobility   - Keep Call bell within reach  - Keep bed low and locked with side rails adjusted as appropriate  - Keep care items and personal belongings within reach  - Initiate and maintain comfort rounds  - Make Fall Risk Sign visible to staff  - Offer Toileting every  Hours, in advance of need  - Initiate/Maintain alarm  - Obtain necessary fall risk management equipment:  Apply yellow socks and bracelet for high fall risk patients  - Consider moving patient to room near nurses station  Outcome: Progressing

## 2024-04-10 NOTE — UTILIZATION REVIEW
Continued Stay Review    SEE INITIAL REVIEW AT BOTTOM    WAS OBSERVATION 4/8/24 @ 0403 CONVERTED TO INPATIENT ADMISSION 4/10/24 @ 0857 DUE TO CONTINUED STAY REQUIRED TO CARE FOR PATIENT WITH DX: CELLULITIS.    Admission Orders (From admission, onward)       Ordered        04/10/24 0857  INPATIENT ADMISSION  Once            04/08/24 0403  Place in Observation  Once                             Orders Placed This Encounter   Procedures    INPATIENT ADMISSION     Standing Status:   Standing     Number of Occurrences:   1     Order Specific Question:   Level of Care     Answer:   Med Surg [16]     Order Specific Question:   Estimated length of stay     Answer:   More than 2 Midnights     Order Specific Question:   Certification     Answer:   I certify that inpatient services are medically necessary for this patient for a duration of greater than two midnights. See H&P and MD Progress Notes for additional information about the patient's course of treatment.        Date: 4/10/24                           Current Patient Class: iIP  Current Level of Care: MS    Date: 4/10/24 - CHANGED TO INPATIENT  Day 3: Has surpassed a 2nd midnight with active treatments and services.  Erythema on the dorsum of the foot is getting better. Right foot with Warmth, Tenderness and erythema  Plan: cont iv abx; monitor labs      Vital Signs:   Date/Time Temp Pulse Resp BP MAP (mmHg) SpO2 O2 Device Patient Position - Orthostatic VS   04/10/24 07:38:04 98.6 °F (37 °C) 79 17 132/88 103 94 % -- --   04/09/24 21:19:24 -- 88 18 127/74 92 92 % None (Room air) Lying   04/09/24 2100 -- -- -- -- -- 91 % None (Room air) --   04/09/24 15:43:15 98.5 °F (36.9 °C) 72 18 154/87 109 92 % None (Room air) Sitting       Pertinent Labs/Diagnostic Results:      Results from last 7 days   Lab Units 04/10/24  0519 04/09/24  0553 04/08/24  0557 04/08/24  0303   WBC Thousand/uL 6.17 6.06 7.93 8.65   HEMOGLOBIN g/dL 12.6 11.8 11.0* 12.1   HEMATOCRIT % 40.6 38.5 35.8 39.1    PLATELETS Thousands/uL 144* 140* 132* 150   TOTAL NEUT ABS Thousands/µL 4.19 4.08 5.84 6.25         Results from last 7 days   Lab Units 04/10/24  0519 04/09/24  0553 04/08/24  0557 04/08/24  0303   SODIUM mmol/L 140 140 136 133*   POTASSIUM mmol/L 3.5 3.7 3.9 4.4   CHLORIDE mmol/L 100 101 102 98   CO2 mmol/L 32 35* 30 29   ANION GAP mmol/L 8 4 4 6   BUN mg/dL 17 14 16 17   CREATININE mg/dL 0.76 0.70 0.68 0.78   EGFR ml/min/1.73sq m 71 79 80 69   CALCIUM mg/dL 8.6 8.9 8.3* 9.1     Results from last 7 days   Lab Units 04/08/24  0303   AST U/L 22   ALT U/L 10   ALK PHOS U/L 83   TOTAL PROTEIN g/dL 7.1   ALBUMIN g/dL 3.8   TOTAL BILIRUBIN mg/dL 0.60        Results from last 7 days   Lab Units 04/10/24  0519 04/09/24  0553 04/08/24  0557 04/08/24  0303   GLUCOSE RANDOM mg/dL 96 87 99 98        Results from last 7 days   Lab Units 04/08/24  0303   CK TOTAL U/L 57        Results from last 7 days   Lab Units 04/08/24  0303   PROTIME seconds 13.0   INR  0.92   PTT seconds 35        Results from last 7 days   Lab Units 04/08/24  0303   PROCALCITONIN ng/ml <0.05     Results from last 7 days   Lab Units 04/08/24  0303   LACTIC ACID mmol/L 1.2      Results from last 7 days   Lab Units 04/08/24  0303   BNP pg/mL 35      Results from last 7 days   Lab Units 04/08/24  0557   CRP mg/L 10.0*      Results from last 7 days   Lab Units 04/08/24  1441   CLARITY UA  Clear   COLOR UA  Colorless   SPEC GRAV UA  1.005   PH UA  7.0   GLUCOSE UA mg/dl Negative   KETONES UA mg/dl Negative   BLOOD UA  Negative   PROTEIN UA mg/dl Negative   NITRITE UA  Negative   BILIRUBIN UA  Negative   UROBILINOGEN UA (BE) mg/dl <2.0   LEUKOCYTES UA  Negative        Results from last 7 days   Lab Units 04/08/24  1235 04/08/24  0303   BLOOD CULTURE   --  No Growth at 48 hrs.  No Growth at 48 hrs.   GRAM STAIN RESULT  3+ Gram positive rods*  2+ Gram positive cocci in pairs*  1+ Gram positive cocci in chains*  1+ Gram negative rods*  Rare Disintegrating  polys*  --    WOUND CULTURE  Culture too young- will reincubate  --         Results from last 7 days   Lab Units 04/09/24  0553   VANCOMYCIN RM ug/mL 8.8*       Medications:   Scheduled Medications:  aspirin, 81 mg, Oral, Daily  atorvastatin, 20 mg, Oral, Daily With Dinner  cefazolin, 2,000 mg, Intravenous, Q8H  enoxaparin, 40 mg, Subcutaneous, Daily  furosemide, 60 mg, Oral, BID (diuretic)  metoprolol tartrate, 12.5 mg, Oral, Daily  multivitamin-minerals, 1 tablet, Oral, Daily      Continuous IV Infusions: None       PRN Meds:  acetaminophen, 650 mg, Oral, Q6H PRN        Discharge Plan: TBD    Network Utilization Review Department  ATTENTION: Please call with any questions or concerns to [phone] and carefully listen to the prompts so that you are directed to the right person. All voicemails are confidential.   For Discharge needs, contact Care Management DC Support Team at [phone] opt. 2  Send all requests for admission clinical reviews, approved or denied determinations and any other requests to dedicated fax number below belonging to the [facility] where the patient is receiving treatment. List of dedicated fax numbers for the Facilities:  FACILITY NAME UR FAX NUMBER   ADMISSION DENIALS (Administrative/Medical Necessity) [phone]   DISCHARGE SUPPORT TEAM (NETWORK) [phone]   PARENT CHILD HEALTH (Maternity/NICU/Pediatrics) [phone]   [facility] [phone]   [facility] [phone]   [facility] [phone]   [facility] [phone]   [facility] [phone]   [facility] [phone]   [facility] [phone]   [facility] [phone]   [facility] [phone]   [facility] [phone]   [facility]  [place] [phone]   [facility] [place] [phone]

## 2024-04-11 VITALS
SYSTOLIC BLOOD PRESSURE: 137 MMHG | TEMPERATURE: 99.2 F | BODY MASS INDEX: 32.54 KG/M2 | HEIGHT: 62 IN | OXYGEN SATURATION: 96 % | WEIGHT: 176.8 LBS | DIASTOLIC BLOOD PRESSURE: 80 MMHG | RESPIRATION RATE: 18 BRPM | HEART RATE: 79 BPM

## 2024-04-11 LAB
ANION GAP SERPL CALCULATED.3IONS-SCNC: 6 MMOL/L (ref 4–13)
BACTERIA WND AEROBE CULT: ABNORMAL
BACTERIA WND AEROBE CULT: ABNORMAL
BASOPHILS # BLD AUTO: 0.02 THOUSANDS/ÂΜL (ref 0–0.1)
BASOPHILS NFR BLD AUTO: 0 % (ref 0–1)
BUN SERPL-MCNC: 16 MG/DL (ref 5–25)
CALCIUM SERPL-MCNC: 8.2 MG/DL (ref 8.4–10.2)
CHLORIDE SERPL-SCNC: 101 MMOL/L (ref 96–108)
CO2 SERPL-SCNC: 33 MMOL/L (ref 21–32)
CREAT SERPL-MCNC: 0.7 MG/DL (ref 0.6–1.3)
EOSINOPHIL # BLD AUTO: 0.21 THOUSAND/ÂΜL (ref 0–0.61)
EOSINOPHIL NFR BLD AUTO: 4 % (ref 0–6)
ERYTHROCYTE [DISTWIDTH] IN BLOOD BY AUTOMATED COUNT: 15.4 % (ref 11.6–15.1)
GFR SERPL CREATININE-BSD FRML MDRD: 79 ML/MIN/1.73SQ M
GLUCOSE SERPL-MCNC: 83 MG/DL (ref 65–140)
GRAM STN SPEC: ABNORMAL
HCT VFR BLD AUTO: 37.7 % (ref 34.8–46.1)
HGB BLD-MCNC: 11.5 G/DL (ref 11.5–15.4)
IMM GRANULOCYTES # BLD AUTO: 0.01 THOUSAND/UL (ref 0–0.2)
IMM GRANULOCYTES NFR BLD AUTO: 0 % (ref 0–2)
LYMPHOCYTES # BLD AUTO: 0.81 THOUSANDS/ÂΜL (ref 0.6–4.47)
LYMPHOCYTES NFR BLD AUTO: 16 % (ref 14–44)
MCH RBC QN AUTO: 28.1 PG (ref 26.8–34.3)
MCHC RBC AUTO-ENTMCNC: 30.5 G/DL (ref 31.4–37.4)
MCV RBC AUTO: 92 FL (ref 82–98)
MONOCYTES # BLD AUTO: 0.74 THOUSAND/ÂΜL (ref 0.17–1.22)
MONOCYTES NFR BLD AUTO: 15 % (ref 4–12)
NEUTROPHILS # BLD AUTO: 3.33 THOUSANDS/ÂΜL (ref 1.85–7.62)
NEUTS SEG NFR BLD AUTO: 65 % (ref 43–75)
NRBC BLD AUTO-RTO: 0 /100 WBCS
PLATELET # BLD AUTO: 132 THOUSANDS/UL (ref 149–390)
PLATELET BLD QL SMEAR: ADEQUATE
PMV BLD AUTO: 12.2 FL (ref 8.9–12.7)
POTASSIUM SERPL-SCNC: 3.3 MMOL/L (ref 3.5–5.3)
RBC # BLD AUTO: 4.09 MILLION/UL (ref 3.81–5.12)
RBC MORPH BLD: NORMAL
SODIUM SERPL-SCNC: 140 MMOL/L (ref 135–147)
WBC # BLD AUTO: 5.12 THOUSAND/UL (ref 4.31–10.16)

## 2024-04-11 PROCEDURE — 99239 HOSP IP/OBS DSCHRG MGMT >30: CPT | Performed by: INTERNAL MEDICINE

## 2024-04-11 PROCEDURE — 85025 COMPLETE CBC W/AUTO DIFF WBC: CPT

## 2024-04-11 PROCEDURE — 80048 BASIC METABOLIC PNL TOTAL CA: CPT

## 2024-04-11 RX ORDER — CEPHALEXIN 500 MG/1
500 CAPSULE ORAL EVERY 6 HOURS SCHEDULED
Start: 2024-04-11 | End: 2024-04-11

## 2024-04-11 RX ORDER — CEPHALEXIN 500 MG/1
500 CAPSULE ORAL EVERY 6 HOURS SCHEDULED
Start: 2024-04-11 | End: 2024-04-14

## 2024-04-11 RX ORDER — ASPIRIN 81 MG/1
81 TABLET ORAL DAILY
Qty: 30 TABLET | Refills: 0 | Status: SHIPPED | OUTPATIENT
Start: 2024-04-11

## 2024-04-11 RX ORDER — DOXYCYCLINE 100 MG/1
100 CAPSULE ORAL 2 TIMES DAILY
Qty: 10 CAPSULE | Refills: 0
Start: 2024-04-11 | End: 2024-04-11

## 2024-04-11 RX ORDER — POTASSIUM CHLORIDE 20MEQ/15ML
40 LIQUID (ML) ORAL ONCE
Status: DISCONTINUED | OUTPATIENT
Start: 2024-04-11 | End: 2024-04-11

## 2024-04-11 RX ORDER — POTASSIUM CHLORIDE 20 MEQ/1
40 TABLET, EXTENDED RELEASE ORAL ONCE
Status: COMPLETED | OUTPATIENT
Start: 2024-04-11 | End: 2024-04-11

## 2024-04-11 RX ORDER — CALCIUM GLUCONATE 20 MG/ML
1 INJECTION, SOLUTION INTRAVENOUS ONCE
Status: COMPLETED | OUTPATIENT
Start: 2024-04-11 | End: 2024-04-11

## 2024-04-11 RX ADMIN — CALCIUM GLUCONATE 1 G: 20 INJECTION, SOLUTION INTRAVENOUS at 09:43

## 2024-04-11 RX ADMIN — ASPIRIN 81 MG CHEWABLE TABLET 81 MG: 81 TABLET CHEWABLE at 09:43

## 2024-04-11 RX ADMIN — ENOXAPARIN SODIUM 40 MG: 40 INJECTION SUBCUTANEOUS at 09:44

## 2024-04-11 RX ADMIN — POTASSIUM CHLORIDE 40 MEQ: 1500 TABLET, EXTENDED RELEASE ORAL at 10:08

## 2024-04-11 RX ADMIN — FUROSEMIDE 60 MG: 20 TABLET ORAL at 10:08

## 2024-04-11 RX ADMIN — CEFAZOLIN SODIUM 2000 MG: 2 SOLUTION INTRAVENOUS at 12:24

## 2024-04-11 RX ADMIN — METOPROLOL TARTRATE 12.5 MG: 25 TABLET, FILM COATED ORAL at 09:43

## 2024-04-11 RX ADMIN — MULTIPLE VITAMINS W/ MINERALS TAB 1 TABLET: TAB ORAL at 09:43

## 2024-04-11 RX ADMIN — CEFAZOLIN SODIUM 2000 MG: 2 SOLUTION INTRAVENOUS at 04:38

## 2024-04-11 NOTE — ASSESSMENT & PLAN NOTE
POA from [facility] with extensive lower extremities erythema (see imaging in media)  H/o of chronic leg swelling due to lymphedema   Hemodynamically stable, not meeting SIRS criteria  Blood work gossly unremarkable  Erythema on the dorsum of the foot is getting better.  Patient received cefazolin for 4 days inpatient    Plan:  Monitor vitals and fever curve  Follow am cbc  P.o. Keflex for next 3 days

## 2024-04-11 NOTE — DISCHARGE SUMMARY
[facility]  Discharge- Elda Maxwell 1940, 83 y.o. female MRN: [id number]  Unit/Bed#: W -01 Encounter: [id number]  Primary Care Provider: Juliette Moon MD   Date and time admitted to hospital: 4/8/2024  1:43 AM    * Cellulitis  Assessment & Plan  POA from [facility] with extensive lower extremities erythema (see imaging in media)  H/o of chronic leg swelling due to lymphedema   Hemodynamically stable, not meeting SIRS criteria  Blood work gossly unremarkable  Erythema on the dorsum of the foot is getting better.  Patient received cefazolin for 4 days inpatient    Plan:  Monitor vitals and fever curve  Follow am cbc  P.o. Keflex for next 3 days    CKD (chronic kidney disease) stage 2, GFR 60-89 ml/min  Assessment & Plan  Lab Results   Component Value Date    EGFR 79 04/11/2024    EGFR 71 04/10/2024    EGFR 79 04/09/2024    CREATININE 0.70 04/11/2024    CREATININE 0.76 04/10/2024    CREATININE 0.70 04/09/2024   Avoid hypotension and nephrotoxic medications    Leg swelling  Assessment & Plan  Chronic lymphedema  Compression stockings  Continue home dose of Lasix on discharge    Essential hypertension  Assessment & Plan  Continue home dose of furosemide 60 mg twice daily, Lopressor 12.5 mg daily        Medical Problems       Resolved Problems  Date Reviewed: 4/10/2024            Resolved    Hyponatremia 4/9/2024     Resolved by  Matthew Gordon MD        Discharging Resident: Matthew Gordon MD  Discharging Attending: Sandy Hammonds MD  PCP: Juliette Moon MD  Admission Date:   Admission Orders (From admission, onward)       Ordered        04/10/24 0857  INPATIENT ADMISSION  Once            04/08/24 0403  Place in Observation  Once                          Discharge Date: 04/11/24    Consultations During Hospital Stay:  Podiatry    Procedures Performed:   None    Significant Findings / Test Results:   None    Incidental Findings:   None    Test Results  "Pending at Discharge (will require follow up):  None     Outpatient Tests Requested:  None    Complications: None    Reason for Admission: Pain in foot    Hospital Course:   Elda Maxwell is a 83 y.o. female patient who originally presented to the hospital on 4/8/2024 due to pain and redness of her right foot.  Patient had a history of chronic lymphedema and has redness of his bilateral lower extremities:.  She presented with complaints of pain in her right foot with an erythema over the forefoot.  Diagnosis of cellulitis was made and she was treated on the lines of cellulitis.  She received 4 days of IV cefazolin and was discharged with oral Keflex for next 3 days.  Of note, during her entire stay, her CBC did not show leukocytosis.  However, examination and response to antibiotics was consistent with cellulitis.  Her overall stay in the hospital remains uneventful.  She was treated conservatively and at the time of discharge she is medically stable.        Please see above list of diagnoses and related plan for additional information.     Condition at Discharge: fair    Discharge Day Visit / Exam:   Subjective: Patient mentions about improvement in her pain.  She denies any chest pain, shortness of breath, palpitations, pain abdomen, diarrhea, nausea, vomiting, constipation.  She also denies any symptoms of LUTS.  Vitals: Blood Pressure: 137/80 (04/11/24 0812)  Pulse: 79 (04/11/24 0812)  Temperature: 99.2 °F (37.3 °C) (04/11/24 0812)  Temp Source: Oral (04/09/24 1543)  Respirations: 18 (04/11/24 0812)  Height: 5' 2\" (157.5 cm) (04/08/24 2126)  Weight - Scale: 80.2 kg (176 lb 12.8 oz) (04/11/24 0555)  SpO2: 96 % (04/11/24 0812)  Exam:   Physical Exam  Vitals and nursing note reviewed.   Constitutional:       General: She is not in acute distress.     Appearance: She is well-developed.   HENT:      Head: Normocephalic and atraumatic.   Eyes:      Conjunctiva/sclera: Conjunctivae normal.   Cardiovascular:      " Rate and Rhythm: Normal rate and regular rhythm.      Heart sounds: No murmur heard.  Pulmonary:      Effort: Pulmonary effort is normal. No respiratory distress.      Breath sounds: Normal breath sounds.   Abdominal:      Palpations: Abdomen is soft.      Tenderness: There is no abdominal tenderness.   Musculoskeletal:         General: No swelling.      Cervical back: Neck supple.      Comments: Erythema bilateral lower limbs   Skin:     General: Skin is warm and dry.      Capillary Refill: Capillary refill takes less than 2 seconds.   Neurological:      Mental Status: She is alert.   Psychiatric:         Mood and Affect: Mood normal.          Discussion with Family: Attempted to update  (other) via phone. Unable to contact.    Discharge instructions/Information to patient and family:   See after visit summary for information provided to patient and family.      Provisions for Follow-Up Care:  See after visit summary for information related to follow-up care and any pertinent home health orders.      Mobility at time of Discharge:   Basic Mobility Inpatient Raw Score: 17  JH-HLM Goal: 5: Stand one or more mins  JH-HLM Achieved: 8: Walk 250 feet ot more  HLM Goal achieved. Continue to encourage appropriate mobility.     Disposition:   Other Skilled Nursing Facility at [facility]    Planned Readmission: no    Discharge Medications:  See after visit summary for reconciled discharge medications provided to patient and/or family.      **Please Note: This note may have been constructed using a voice recognition system**

## 2024-04-11 NOTE — CASE MANAGEMENT
Case Management Discharge Planning Note    Patient name Elda Maxwell  Location W /W -01 MRN [id number]  : 1940 Date 2024       Current Admission Date: 2024  Current Admission Diagnosis:Cellulitis   Patient Active Problem List    Diagnosis Date Noted    Cellulitis 2024    CKD (chronic kidney disease) stage 2, GFR 60-89 ml/min 2021    Acute respiratory failure with hypoxia (HCC) 06/15/2021    Leg swelling 06/15/2021    Claustrophobia 2018    Periorbital edema 2018    Dry mouth 2018    DAVE (obstructive sleep apnea) 2018    Hypersomnia 2018    Insufficient sleep syndrome 2018    Obesity (BMI 30-39.9) 2018    Essential hypertension 2018    Localized swelling of both lower legs 2018      LOS (days): 1  Geometric Mean LOS (GMLOS) (days): 3.2  Days to GMLOS:2.2     OBJECTIVE:  Risk of Unplanned Readmission Score: 10.46         Current admission status: Inpatient   Preferred Pharmacy:   [id number] - [place] - [address]  [address] [address]  Phone: [phone] Fax: [phone]    [id number] - [place], [place] - [address]  [address]  [place] [address]  Phone: [phone] Fax: [phone]    [facility] [place] - [address]  [address] [place] [address]  Phone: [phone] Fax: [phone]    [facility] [place] - [address]  [address] [place] [address]  Phone: [phone] Fax: [phone]    Primary Care Provider: Juliette Moon MD    Primary Insurance: BLUE CROSS  REP  Secondary Insurance:     DISCHARGE DETAILS:    Discharge planning discussed with:: Patient's POA/Niece  Orange of Choice: Yes  Comments - Freedom of Choice: Choice is to D/C to [facility]  CM contacted family/caregiver?: Yes  Were Treatment Team discharge recommendations reviewed with  patient/caregiver?: Yes  Did patient/caregiver verbalize understanding of patient care needs?: N/A- going to facility  Were patient/caregiver advised of the risks associated with not following Treatment Team discharge recommendations?: Yes    Contacts  Patient Contacts: Jo-Ann Rolle  Relationship to Patient:: Family (POA/Niece)  Contact Method: Phone  Phone Number: [phone]  Reason/Outcome: Emergency Contact, Discharge Planning    Requested Home Health Care         Is the patient interested in HHC at discharge?: No    DME Referral Provided  Referral made for DME?: No    Would you like to participate in our [facility] service program?  : No - Declined    Treatment Team Recommendation: Short Term Rehab  Discharge Destination Plan:: Short Term Rehab ([facility])  Transport at Discharge : Wheelchair van  Dispatcher Contacted: Yes  Number/Name of Dispatcher: ROUNDTRIP  Transported by (Company and Unit #): [employer]  ETA of Transport (Date): 04/11/24  ETA of Transport (Time): 1300    Accepting Facility Name, City & State : [facility]  [place]  Receiving Facility/Agency Phone Number: [phone] ext [phone]  Facility/Agency Fax Number: [phone]  Facility Insurance Auth Number: [id number]

## 2024-04-11 NOTE — UTILIZATION REVIEW
Dolores Alonso, RN  Registered Nurse  Specialty: Utilization Review     Utilization Review      Addendum     Date of Service: 4/9/2024  7:08 AM       Initial Clinical Review      4/9/ 24 @  1730 :awaiting IP order to be placed,pt  reaches 48 hrs as OBS overnight  (4/10/24 @ 0403). Will fax review while awaiting IP order. Ongoing IV abx .         Admission: Date/Time/Statement:   Admission Orders (From admission, onward)          Ordered         04/08/24 0403   Place in Observation  Once                                     Orders Placed This Encounter   Procedures    Place in Observation       Standing Status:   Standing       Number of Occurrences:   1       Order Specific Question:   Level of Care       Answer:   Med Surg [16]      ED Arrival Information         Expected   -    Arrival   4/8/2024 01:43    Acuity   Urgent                 Means of arrival   Ambulance    Escorted by   [facility]    Service   Hospitalist    Admission type   Emergency                 Arrival complaint   Foot Infection                     Chief Complaint   Patient presents with    Foot Pain       Patient arrived via EMS from [facility] with bilateral feet swelling, pain, and redness.         Initial Presentation: 83 y.o. female with PMH of HTN, HLD, CKD, chronic lymphedema who presents to ED from [facility] with worsening LE swelling x 3 days from chronic LE edema. Pt has also developed new right foot redness and swelling . Pr reports pain w/ ambulation . On exam,significant readness and swelling in the right foot with demarcating line. BLE edema.  Labs Na 133. On prelim read XR R ft shows nothing acute. Pt given IV abx, IVF bolus in ED. Pt admitted as OBS with cellulitis. Hyponatremia  . Plan- IV abx- cefepime . F/U blood,wound  MRSA cx . CBC in am. Obtain venous duplex BLE. F/U XR R ft . CRP . Compression stocking,. Restart home diuretics and monitor edema . BMP in am .         Date: 4/9   Medicine-  CRP on admission 10.0. WBC WNL.  Na normalized today . IV abx changed to IV Ancef yesterday. Continue IV Ancef . Leg elevation . Edema control with p.o. diuretics . Pt  reports pain at rest starting to improve . Erythema, warm, tender R ft .  Podiatry consult pending .    Podiatry consult- Right foot cellulitis Lower extremity edema . Cellulitis of the right forefoot extending to the level of the midfoot with no open wounds.  Exam :Right DP and PT pulses are nonpalpable secondary to edema but biphasic on Doppler. Left DP and PT pulses are nonpalpable secondary to edema but biphasic on Doppler. CRT < 3 seconds at the digits. +2/4 edema noted at bilateral lower extremities. Pedal hair is absent. Skin temperature is warm to the right foot compared to the LLE .  No open lesions noted to the interdigital spaces, however there is peeling xerotic skin  Venous duplex BLE neg for DVT ,. CXR R ft w/o evidence osteomyelitis. Plan-  Continue IV abx per primary team .  Given there are no open wounds the to the right foot or within any of the interdigital spaces, reliability of wound culture may be questionable .  BLE elevation . WBAT BLE .           4/9 RLE           4/9 BLE           4/9 RLE           4/9 RLE           4/9 RLE             ED Triage Vitals   Temperature Pulse Respirations Blood Pressure SpO2   04/08/24 0148 04/08/24 0148 04/08/24 0148 04/08/24 0148 04/08/24 0148   98.2 °F (36.8 °C) 89 18 162/77 92 %       Temp Source Heart Rate Source Patient Position - Orthostatic VS BP Location FiO2 (%)   04/08/24 0148 04/08/24 0148 04/08/24 0445 04/08/24 0445 --   Oral Monitor Sitting Left arm         Pain Score           04/08/24 0900           No Pain                  Wt Readings from Last 1 Encounters:   04/09/24 82.2 kg (181 lb 5 oz)      Additional Vital Signs:   Date/Time Temp Pulse Resp BP MAP (mmHg) SpO2   04/09/24 0000 -- -- -- -- -- 92 %   04/08/24 2300 -- 75 20 -- -- 90 %    SpO2: 90 at 04/08/24 2300   04/08/24  2126 -- -- -- -- -- --   04/08/24 21:23:47 98.6 °F (37 °C) 80 -- 152/80 104 91 %   04/08/24 1700 -- 78 18 132/62 89 97 %   04/08/24 1652 -- -- 18 132/62 -- 96 %   04/08/24 1128 -- -- -- 130/62 89 --   04/08/24 0930 -- 81 -- 133/63 91 96 %   04/08/24 0900 -- 81 -- 122/58 83 96 %   04/08/24 0829 -- 87 -- 124/60 -- --   04/08/24 0730 -- 80 -- 133/61 88 97 %   04/08/24 0700 -- 85 -- 135/62 89 97 %   04/08/24 0630 -- 84 18 138/63 91 96 %   04/08/24 0600 -- 82 18 121/58 84 92 %   04/08/24 0530 -- 86 18 122/60 86 90 %   04/08/24 0500 -- 90 18 141/71 101 92 %   04/08/24 0445 -- 88 18 145/68 98 93 %      Pertinent Labs/Diagnostic Test Results:    4/8 ECG- Normal sinus rhythm  Left axis deviation  Right bundle branch block   VAS VENOUS DUPLEX - LOWER LIMB BILATERAL   Final Result by Adi Sharp MD (04/08 2116)       XR chest portable   ED Interpretation by Phil Douglas MD (04/08 0241)   Poor inspiratory effort, patient is rotated, cardiac border overlies right costophrenic angle however no discernible focal consolidation.  No acute cardiopulmonary disease.  Independently interpreted by myself.       XR foot 3+ views RIGHT   ED Interpretation by Phil Douglas MD (04/08 0241)   No acute fracture or dislocation. Interpreted independently by myself.       4/8 venous duplex BLE   RIGHT LOWER LIMB:  No evidence of acute or chronic deep vein thrombosis.  No evidence of superficial thrombophlebitis noted.  Doppler evaluation shows a normal response to augmentation maneuvers..  Popliteal, posterior tibial and anterior tibial arterial Doppler waveform's are  biphasic/hyperemic.     LEFT LOWER LIMB:  No evidence of acute or chronic deep vein thrombosis.  No evidence of superficial thrombophlebitis noted.  Doppler evaluation shows a normal response to augmentation maneuvers.  Popliteal, posterior tibial and anterior tibial arterial Doppler waveform's are  triphasic/hyperemic.     Technically limited study- unable  "to perform compressions on some parts of the  study due to patients pain.             Results from last 7 days   Lab Units 04/09/24  0553 04/08/24  0557 04/08/24  0303   WBC Thousand/uL 6.06 7.93 8.65   HEMOGLOBIN g/dL 11.8 11.0* 12.1   HEMATOCRIT % 38.5 35.8 39.1   PLATELETS Thousands/uL 140* 132* 150   NEUTROS ABS Thousands/µL 4.08 5.84 6.25                 Results from last 7 days   Lab Units 04/09/24  0553 04/08/24  0557 04/08/24  0303   SODIUM mmol/L 140 136 133*   POTASSIUM mmol/L 3.7 3.9 4.4   CHLORIDE mmol/L 101 102 98   CO2 mmol/L 35* 30 29   ANION GAP mmol/L 4 4 6   BUN mg/dL 14 16 17   CREATININE mg/dL 0.70 0.68 0.78   EGFR ml/min/1.73sq m 79 80 69   CALCIUM mg/dL 8.9 8.3* 9.1           Results from last 7 days   Lab Units 04/08/24  0303   AST U/L 22   ALT U/L 10   ALK PHOS U/L 83   TOTAL PROTEIN g/dL 7.1   ALBUMIN g/dL 3.8   TOTAL BILIRUBIN mg/dL 0.60                 Results from last 7 days   Lab Units 04/09/24  0553 04/08/24  0557 04/08/24  0303   GLUCOSE RANDOM mg/dL 87 99 98              No results found for: \"BETA-HYDROXYBUTYRATE\"                    Results from last 7 days   Lab Units 04/08/24  0303   CK TOTAL U/L 57                   Results from last 7 days   Lab Units 04/08/24  0303   PROTIME seconds 13.0   INR   0.92   PTT seconds 35               Results from last 7 days   Lab Units 04/08/24  0303   PROCALCITONIN ng/ml <0.05           Results from last 7 days   Lab Units 04/08/24  0303   LACTIC ACID mmol/L 1.2                   Results from last 7 days   Lab Units 04/08/24  0303   BNP pg/mL 35                               Results from last 7 days   Lab Units 04/08/24  0557   CRP mg/L 10.0*                   Results from last 7 days   Lab Units 04/08/24  1441   CLARITY UA   Clear   COLOR UA   Colorless   SPEC GRAV UA   1.005   PH UA   7.0   GLUCOSE UA mg/dl Negative   KETONES UA mg/dl Negative   BLOOD UA   Negative   PROTEIN UA mg/dl Negative   NITRITE UA   Negative   BILIRUBIN UA   Negative "   UROBILINOGEN UA (BE) mg/dl <2.0   LEUKOCYTES UA   Negative                                        Results from last 7 days   Lab Units 04/08/24  1235 04/08/24  0303   BLOOD CULTURE    --  No Growth at 24 hrs.  No Growth at 24 hrs.   GRAM STAIN RESULT   3+ Gram positive rods*  2+ Gram positive cocci in pairs*  1+ Gram positive cocci in chains*  1+ Gram negative rods*  Rare Disintegrating polys*  --                      ED Treatment:   Medication Administration from 04/08/2024 0143 to 04/08/2024 2116           Date/Time Order Dose Route Action       04/08/2024 0234 EDT tetanus-diphtheria-acellular pertussis (BOOSTRIX) IM injection 0.5 mL 0.5 mL Intramuscular Given       04/08/2024 0634 EDT vancomycin (VANCOCIN) 1500 mg in sodium chloride 0.9% 250 mL IVPB 0 mg Intravenous Stopped       04/08/2024 0440 EDT vancomycin (VANCOCIN) 1500 mg in sodium chloride 0.9% 250 mL IVPB 1,500 mg Intravenous New Bag       04/08/2024 0437 EDT cefepime (MAXIPIME) 2 g/50 mL dextrose IVPB 0 mg Intravenous Stopped       04/08/2024 0346 EDT cefepime (MAXIPIME) 2 g/50 mL dextrose IVPB 2,000 mg Intravenous New Bag       04/08/2024 0437 EDT sodium chloride 0.9 % bolus 500 mL 0 mL Intravenous Stopped       04/08/2024 0348 EDT sodium chloride 0.9 % bolus 500 mL 500 mL Intravenous New Bag       04/08/2024 0828 EDT aspirin chewable tablet 81 mg 81 mg Oral Given       04/08/2024 1642 EDT furosemide (LASIX) tablet 60 mg 0 mg Oral Hold       04/08/2024 0828 EDT furosemide (LASIX) tablet 60 mg 60 mg Oral Given       04/08/2024 0829 EDT metoprolol tartrate (LOPRESSOR) partial tablet 12.5 mg 12.5 mg Oral Given       04/08/2024 0834 EDT multivitamin-minerals (CENTRUM) tablet 1 tablet 1 tablet Oral Given       04/08/2024 1645 EDT atorvastatin (LIPITOR) tablet 20 mg 20 mg Oral Given       04/08/2024 0828 EDT enoxaparin (LOVENOX) subcutaneous injection 40 mg 40 mg Subcutaneous Given       04/08/2024 1156 EDT cefepime (MAXIPIME) 1,000 mg in dextrose 5 %  50 mL IVPB 0 mg Intravenous Stopped       04/08/2024 1126 EDT cefepime (MAXIPIME) 1,000 mg in dextrose 5 % 50 mL IVPB 1,000 mg Intravenous New Bag       04/08/2024 0858 EDT vancomycin (VANCOCIN) IVPB (premix in dextrose) 500 mg 100 mL 0 mg Intravenous Stopped       04/08/2024 0828 EDT vancomycin (VANCOCIN) IVPB (premix in dextrose) 500 mg 100 mL 500 mg Intravenous New Bag       04/08/2024 1937 EDT ceFAZolin (ANCEF) IVPB (premix in dextrose) 2,000 mg 50 mL 2,000 mg Intravenous New Bag                  Past Medical History:   Diagnosis Date    Acid reflux      Breast cancer (HCC)       Armidex    Breast cancer (HCC)      DVT (deep venous thrombosis) (HCC) 07/2016     subacute/chronic dvt on the right side in     Edema       chronic leg edema, PSBO 12/31/2015    Heart failure (HCC)      History of echocardiogram 07/01/2016     Transthoracic Echocardiogram 7/1/16: LV normal size left ventricle. no LVH. normal global systolic LV function (EF 61%). no gross regional wall motion abnormality. normal diastolic LV function. RV normal size. no RVH . normal RV function. Left atrium is normal in size. normal appearing atrial septum. Right atrium is normal in size. normal appearing atrial septum. mitral valve has mild thickenin    History of EKG 11/01/2017     EKG 11/1/17: normal sinus rhythm; RBBB, left anterior fascicular block, bifascicular block; moderate voltage criteria for LVH, may be normal variant, abnormal ECG , EKG 5/26/17: normal sinus rhythm, RBBB, left anterior fascicular block, bifascicular block; moderate voltage criteria for LVH, may be normal variant; abnormal ECG. . EKG 3/1/17: normal sinus rhythm; RBBB, left anterior fascicular bloc    History of stress test 04/25/2014     Adenosine Cardiolite Stress Test 4/25/14: EKG portion- 1) the stress test was inconclusive for myocardial ischemia due to baseline EKG abnormalities 2) physiologic response to adenosine infusion 3) cardiolite scan is attached. Conclusion:  Very mildly abnormal Adenosine Cardiolite SPECT study. there is a small fixed mild defect in the left ventricular apex. there is no reversible ischemia. diffe    Hyperlipidemia      Hypertension      Hypertension      Leg edema       chronic     Lymphedema      Osteopenia      Peripheral vascular disease (HCC)      Scoliosis      Sleep apnea      Thrombocytopenia (HCC)      Vitamin D deficiency        Present on Admission:   Essential hypertension   Obesity (BMI 30-39.9)   DAVE (obstructive sleep apnea)   Leg swelling   CKD (chronic kidney disease) stage 2, GFR 60-89 ml/min        Admitting Diagnosis: Cellulitis [L03.90]  Foot infection [L08.9]  Age/Sex: 83 y.o. female  Admission Orders:  Scheduled Medications:  aspirin, 81 mg, Oral, Daily  atorvastatin, 20 mg, Oral, Daily With Dinner  cefazolin, 2,000 mg, Intravenous, Q8H  enoxaparin, 40 mg, Subcutaneous, Daily  furosemide, 60 mg, Oral, BID (diuretic)  metoprolol tartrate, 12.5 mg, Oral, Daily  multivitamin-minerals, 1 tablet, Oral, Daily     cefepime (MAXIPIME) 1,000 mg in dextrose 5 % 50 mL IVPB  Dose: 1,000 mg  Freq: Every 12 hours Route: IV  Last Dose: Stopped (04/08/24 1156)  Start: 04/08/24 1200 End: 04/08/24 1146   Continuous IV Infusions:  PRN Meds:  acetaminophen, 650 mg, Oral, Q6H PRN     Level 2 carb, 2 Gm na diet , FR 1800 ml    SCD   OOB as dulce maria   Knee hi compression stockings     IP CONSULT TO PODIATRY     Network Utilization Review Department  ATTENTION: Please call with any questions or concerns to [phone] and carefully listen to the prompts so that you are directed to the right person. All voicemails are confidential.   For Discharge needs, contact Care Management DC Support Team at [phone] opt. [phone]  Send all requests for admission clinical reviews, approved or denied determinations and any other requests to dedicated fax number below belonging to the campus where the patient is receiving treatment. List of dedicated fax numbers for the  Facilities:  FACILITY NAME UR FAX NUMBER   ADMISSION DENIALS (Administrative/Medical Necessity) [phone]   DISCHARGE SUPPORT TEAM (NETWORK) [phone]   PARENT CHILD HEALTH (Maternity/NICU/Pediatrics) [phone]   [facility] [phone]   [facility] [phone]   [facility] [phone]   [facility] [phone]   [facility] [phone]   [facility] [phone]   [facility] [phone]   [facility] [phone]   [facility] [phone]   [facility] [phone]   [facility] [phone]   [facility] [phone]               Revision History

## 2024-04-11 NOTE — ASSESSMENT & PLAN NOTE
Lab Results   Component Value Date    EGFR 79 04/11/2024    EGFR 71 04/10/2024    EGFR 79 04/09/2024    CREATININE 0.70 04/11/2024    CREATININE 0.76 04/10/2024    CREATININE 0.70 04/09/2024   Avoid hypotension and nephrotoxic medications

## 2024-04-11 NOTE — PLAN OF CARE
Problem: Prexisting or High Potential for Compromised Skin Integrity  Goal: Skin integrity is maintained or improved  Description: INTERVENTIONS:  - Identify patients at risk for skin breakdown  - Assess and monitor skin integrity  - Assess and monitor nutrition and hydration status  - Monitor labs   - Assess for incontinence   - Turn and reposition patient  - Assist with mobility/ambulation  - Relieve pressure over bony prominences  - Avoid friction and shearing  - Provide appropriate hygiene as needed including keeping skin clean and dry  - Evaluate need for skin moisturizer/barrier cream  - Collaborate with interdisciplinary team   - Patient/family teaching  - Consider wound care consult   Outcome: Progressing     Problem: PAIN - ADULT  Goal: Verbalizes/displays adequate comfort level or baseline comfort level  Description: Interventions:  - Encourage patient to monitor pain and request assistance  - Assess pain using appropriate pain scale  - Administer analgesics based on type and severity of pain and evaluate response  - Implement non-pharmacological measures as appropriate and evaluate response  - Consider cultural and social influences on pain and pain management  - Notify physician/advanced practitioner if interventions unsuccessful or patient reports new pain  Outcome: Progressing     Problem: INFECTION - ADULT  Goal: Absence or prevention of progression during hospitalization  Description: INTERVENTIONS:  - Assess and monitor for signs and symptoms of infection  - Monitor lab/diagnostic results  - Monitor all insertion sites, i.e. indwelling lines, tubes, and drains  - Monitor endotracheal if appropriate and nasal secretions for changes in amount and color  - [place] appropriate cooling/warming therapies per order  - Administer medications as ordered  - Instruct and encourage patient and family to use good hand hygiene technique  - Identify and instruct in appropriate isolation precautions for  identified infection/condition  Outcome: Progressing     Problem: DISCHARGE PLANNING  Goal: Discharge to home or other facility with appropriate resources  Description: INTERVENTIONS:  - Identify barriers to discharge w/patient and caregiver  - Arrange for needed discharge resources and transportation as appropriate  - Identify discharge learning needs (meds, wound care, etc.)  - Arrange for interpretive services to assist at discharge as needed  - Refer to Case Management Department for coordinating discharge planning if the patient needs post-hospital services based on physician/advanced practitioner order or complex needs related to functional status, cognitive ability, or social support system  Outcome: Progressing     Problem: Knowledge Deficit  Goal: Patient/family/caregiver demonstrates understanding of disease process, treatment plan, medications, and discharge instructions  Description: Complete learning assessment and assess knowledge base.  Interventions:  - Provide teaching at level of understanding  - Provide teaching via preferred learning methods  Outcome: Progressing     Problem: Potential for Falls  Goal: Patient will remain free of falls  Description: INTERVENTIONS:  - Educate patient/family on patient safety including physical limitations  - Instruct patient to call for assistance with activity   - Consult OT/PT to assist with strengthening/mobility   - Keep Call bell within reach  - Keep bed low and locked with side rails adjusted as appropriate  - Keep care items and personal belongings within reach  - Initiate and maintain comfort rounds  - Make Fall Risk Sign visible to staff  - Offer Toileting every  Hours, in advance of need  - Initiate/Maintain alarm  - Obtain necessary fall risk management equipment  - Apply yellow socks and bracelet for high fall risk patients  - Consider moving patient to room near nurses station  Outcome: Progressing

## 2024-04-13 LAB
BACTERIA BLD CULT: NORMAL
BACTERIA BLD CULT: NORMAL

## 2024-11-26 ENCOUNTER — HOSPITAL ENCOUNTER (OUTPATIENT)
Facility: HOSPITAL | Age: 84
Discharge: HOME | End: 2024-11-26
Payer: COMMERCIAL

## 2024-11-26 VITALS — BODY MASS INDEX: 32.39 KG/M2 | WEIGHT: 176 LBS | HEIGHT: 62 IN

## 2024-11-26 DIAGNOSIS — Z12.31 ENCOUNTER FOR SCREENING MAMMOGRAM FOR MALIGNANT NEOPLASM OF BREAST: ICD-10-CM

## 2024-11-26 PROCEDURE — 77063 BREAST TOMOSYNTHESIS BI: CPT

## 2024-11-26 PROCEDURE — 77067 SCR MAMMO BI INCL CAD: CPT

## (undated) RX ORDER — HYDROXYZINE HYDROCHLORIDE 25 MG/1
1 TABLET, FILM COATED ORAL
Start: 2021-12-10

## (undated) RX ORDER — BROMFENAC SODIUM 0.7 MG/ML
1 SOLUTION/ DROPS OPHTHALMIC ONCE A DAY
Start: 2021-12-10

## (undated) RX ORDER — DUREZOL 0.5 MG/ML
1 EMULSION OPHTHALMIC
Start: 2021-12-13